# Patient Record
Sex: MALE | Race: BLACK OR AFRICAN AMERICAN | Employment: OTHER | URBAN - METROPOLITAN AREA
[De-identification: names, ages, dates, MRNs, and addresses within clinical notes are randomized per-mention and may not be internally consistent; named-entity substitution may affect disease eponyms.]

---

## 2024-05-13 ENCOUNTER — LAB ENCOUNTER (OUTPATIENT)
Dept: LAB | Facility: REFERENCE LAB | Age: 59
End: 2024-05-13
Attending: FAMILY MEDICINE

## 2024-05-13 ENCOUNTER — TELEPHONE (OUTPATIENT)
Facility: CLINIC | Age: 59
End: 2024-05-13

## 2024-05-13 ENCOUNTER — OFFICE VISIT (OUTPATIENT)
Facility: CLINIC | Age: 59
End: 2024-05-13
Payer: COMMERCIAL

## 2024-05-13 VITALS
BODY MASS INDEX: 28.24 KG/M2 | OXYGEN SATURATION: 97 % | WEIGHT: 208.5 LBS | SYSTOLIC BLOOD PRESSURE: 126 MMHG | HEIGHT: 72 IN | HEART RATE: 104 BPM | DIASTOLIC BLOOD PRESSURE: 70 MMHG

## 2024-05-13 DIAGNOSIS — I73.9 PAD (PERIPHERAL ARTERY DISEASE) (HCC): ICD-10-CM

## 2024-05-13 DIAGNOSIS — E11.65 TYPE 2 DIABETES MELLITUS WITH HYPERGLYCEMIA, WITHOUT LONG-TERM CURRENT USE OF INSULIN (HCC): ICD-10-CM

## 2024-05-13 DIAGNOSIS — I15.2 HYPERTENSION ASSOCIATED WITH TYPE 2 DIABETES MELLITUS (HCC): ICD-10-CM

## 2024-05-13 DIAGNOSIS — L97.525 DIABETIC ULCER OF LEFT FOOT ASSOCIATED WITH TYPE 2 DIABETES MELLITUS, WITH MUSCLE INVOLVEMENT WITHOUT EVIDENCE OF NECROSIS, UNSPECIFIED PART OF FOOT (HCC): ICD-10-CM

## 2024-05-13 DIAGNOSIS — Z00.01 ENCOUNTER FOR WELL ADULT EXAM WITH ABNORMAL FINDINGS: Primary | ICD-10-CM

## 2024-05-13 DIAGNOSIS — Z89.421 HX OF AMPUTATION OF LESSER TOE, RIGHT (HCC): ICD-10-CM

## 2024-05-13 DIAGNOSIS — Z00.01 ENCOUNTER FOR WELL ADULT EXAM WITH ABNORMAL FINDINGS: ICD-10-CM

## 2024-05-13 DIAGNOSIS — Z89.422 HX OF AMPUTATION OF LESSER TOE, LEFT (HCC): ICD-10-CM

## 2024-05-13 DIAGNOSIS — Z72.0 TOBACCO USER: ICD-10-CM

## 2024-05-13 DIAGNOSIS — E11.59 HYPERTENSION ASSOCIATED WITH TYPE 2 DIABETES MELLITUS (HCC): ICD-10-CM

## 2024-05-13 DIAGNOSIS — E11.621 DIABETIC ULCER OF LEFT FOOT ASSOCIATED WITH TYPE 2 DIABETES MELLITUS, WITH MUSCLE INVOLVEMENT WITHOUT EVIDENCE OF NECROSIS, UNSPECIFIED PART OF FOOT (HCC): ICD-10-CM

## 2024-05-13 DIAGNOSIS — E11.42 DIABETIC POLYNEUROPATHY ASSOCIATED WITH TYPE 2 DIABETES MELLITUS (HCC): ICD-10-CM

## 2024-05-13 DIAGNOSIS — E78.5 DYSLIPIDEMIA: ICD-10-CM

## 2024-05-13 PROBLEM — E11.40 DIABETIC NEUROPATHY (HCC): Status: ACTIVE | Noted: 2024-01-25

## 2024-05-13 LAB
ALBUMIN SERPL-MCNC: 4 G/DL (ref 3.2–4.8)
ALBUMIN/GLOB SERPL: 1.1 {RATIO} (ref 1–2)
ALP LIVER SERPL-CCNC: 123 U/L
ALT SERPL-CCNC: <7 U/L
ANION GAP SERPL CALC-SCNC: 7 MMOL/L (ref 0–18)
AST SERPL-CCNC: 11 U/L (ref ?–34)
BASOPHILS # BLD AUTO: 0.1 X10(3) UL (ref 0–0.2)
BASOPHILS NFR BLD AUTO: 1 %
BILIRUB SERPL-MCNC: 0.2 MG/DL (ref 0.3–1.2)
BUN BLD-MCNC: 20 MG/DL (ref 9–23)
BUN/CREAT SERPL: 13.3 (ref 10–20)
CALCIUM BLD-MCNC: 9.2 MG/DL (ref 8.7–10.4)
CHLORIDE SERPL-SCNC: 103 MMOL/L (ref 98–112)
CHOLEST SERPL-MCNC: 276 MG/DL (ref ?–200)
CO2 SERPL-SCNC: 25 MMOL/L (ref 21–32)
COMPLEXED PSA SERPL-MCNC: 1.15 NG/ML (ref ?–4)
CREAT BLD-MCNC: 1.5 MG/DL
DEPRECATED RDW RBC AUTO: 38.5 FL (ref 35.1–46.3)
EGFRCR SERPLBLD CKD-EPI 2021: 53 ML/MIN/1.73M2 (ref 60–?)
EOSINOPHIL # BLD AUTO: 0.3 X10(3) UL (ref 0–0.7)
EOSINOPHIL NFR BLD AUTO: 2.9 %
ERYTHROCYTE [DISTWIDTH] IN BLOOD BY AUTOMATED COUNT: 12.9 % (ref 11–15)
FASTING PATIENT LIPID ANSWER: YES
FASTING STATUS PATIENT QL REPORTED: YES
GLOBULIN PLAS-MCNC: 3.8 G/DL (ref 2–3.5)
GLUCOSE BLD-MCNC: 420 MG/DL (ref 70–99)
HCT VFR BLD AUTO: 39.7 %
HDLC SERPL-MCNC: 34 MG/DL (ref 40–59)
HGB BLD-MCNC: 13.4 G/DL
IMM GRANULOCYTES # BLD AUTO: 0.13 X10(3) UL (ref 0–1)
IMM GRANULOCYTES NFR BLD: 1.2 %
LDLC SERPL CALC-MCNC: 189 MG/DL (ref ?–100)
LYMPHOCYTES # BLD AUTO: 3.78 X10(3) UL (ref 1–4)
LYMPHOCYTES NFR BLD AUTO: 36.2 %
MCH RBC QN AUTO: 27.7 PG (ref 26–34)
MCHC RBC AUTO-ENTMCNC: 33.8 G/DL (ref 31–37)
MCV RBC AUTO: 82 FL
MONOCYTES # BLD AUTO: 0.71 X10(3) UL (ref 0.1–1)
MONOCYTES NFR BLD AUTO: 6.8 %
NEUTROPHILS # BLD AUTO: 5.42 X10 (3) UL (ref 1.5–7.7)
NEUTROPHILS # BLD AUTO: 5.42 X10(3) UL (ref 1.5–7.7)
NEUTROPHILS NFR BLD AUTO: 51.9 %
NONHDLC SERPL-MCNC: 242 MG/DL (ref ?–130)
OSMOLALITY SERPL CALC.SUM OF ELEC: 300 MOSM/KG (ref 275–295)
PLATELET # BLD AUTO: 323 10(3)UL (ref 150–450)
POTASSIUM SERPL-SCNC: 5.1 MMOL/L (ref 3.5–5.1)
PROT SERPL-MCNC: 7.8 G/DL (ref 5.7–8.2)
RBC # BLD AUTO: 4.84 X10(6)UL
SODIUM SERPL-SCNC: 135 MMOL/L (ref 136–145)
TRIGL SERPL-MCNC: 272 MG/DL (ref 30–149)
TSI SER-ACNC: 1.8 MIU/ML (ref 0.55–4.78)
VLDLC SERPL CALC-MCNC: 58 MG/DL (ref 0–30)
WBC # BLD AUTO: 10.4 X10(3) UL (ref 4–11)

## 2024-05-13 PROCEDURE — 84443 ASSAY THYROID STIM HORMONE: CPT

## 2024-05-13 PROCEDURE — 83036 HEMOGLOBIN GLYCOSYLATED A1C: CPT

## 2024-05-13 PROCEDURE — 85025 COMPLETE CBC W/AUTO DIFF WBC: CPT

## 2024-05-13 PROCEDURE — 80061 LIPID PANEL: CPT

## 2024-05-13 PROCEDURE — 36415 COLL VENOUS BLD VENIPUNCTURE: CPT

## 2024-05-13 PROCEDURE — 80053 COMPREHEN METABOLIC PANEL: CPT

## 2024-05-13 RX ORDER — GABAPENTIN 300 MG/1
300 CAPSULE ORAL 3 TIMES DAILY
COMMUNITY

## 2024-05-13 RX ORDER — NICOTINE 21 MG/24HR
1 PATCH, TRANSDERMAL 24 HOURS TRANSDERMAL EVERY 24 HOURS
Qty: 28 EACH | Refills: 3 | Status: SHIPPED | OUTPATIENT
Start: 2024-05-13

## 2024-05-13 RX ORDER — SIMVASTATIN 20 MG
20 TABLET ORAL DAILY
Qty: 90 TABLET | Refills: 3 | Status: SHIPPED | OUTPATIENT
Start: 2024-05-13

## 2024-05-13 RX ORDER — HYDROCODONE BITARTRATE AND ACETAMINOPHEN 5; 325 MG/1; MG/1
1 TABLET ORAL EVERY 12 HOURS PRN
COMMUNITY
Start: 2023-12-09

## 2024-05-13 RX ORDER — GLIPIZIDE 10 MG/1
10 TABLET ORAL
COMMUNITY

## 2024-05-13 RX ORDER — SIMVASTATIN 20 MG
20 TABLET ORAL DAILY
COMMUNITY
Start: 2024-01-02 | End: 2024-05-13

## 2024-05-13 RX ORDER — CLOPIDOGREL BISULFATE 75 MG/1
75 TABLET ORAL NIGHTLY
COMMUNITY

## 2024-05-13 RX ORDER — ASPIRIN 81 MG/1
81 TABLET ORAL DAILY
COMMUNITY
Start: 2021-12-07

## 2024-05-13 NOTE — PROGRESS NOTES
Subjective:   Yo Villagomez is a 59 year old male who presents for Establish Care (Patient comes to the office to establish care. ) and Physical (Patient is requesting annual physical exam. Patient wants a prescription for a cane. )     Hx of amputations and left foot wounds with PAD  Does have diabetes. Has had all toes amputated on left foot and 5th digit amputated on right foot. Was getting chronic pain medication from podiatrist in the past. Was being seen at [facility] but they are no longer in network due to new insurance. Is wondering if can get cane covered by insurance for assistance with ambulation. Also has history of nonhealing foot wounds on left foot. Was being seen by wound care in the past.      DM  Currently on glipizide and metformin. Does not have glucometer. Had eye regular eye exam in march 2024. No dilated exam. No issues with floaters. Has never been diagnosed with any eye problems.    Smoker  Smokes half a pack a day. Used to smoke a pack a day. Has cut self down. Has never used anything like patches or gum. Has never been able to quit on own. Has been smoking for about 15 years.     HTN  Controlled on diet alone    Health screenings  Colon cancer screening did cologuard in 2022    History/Other:    Chief Complaint Reviewed and Verified  Nursing Notes Reviewed and   Verified  Tobacco Reviewed  Allergies Reviewed  Medications Reviewed    Problem List Reviewed  Medical History Reviewed  Surgical History   Reviewed  Family History Reviewed  Social History Reviewed         Tobacco:  Social History     Tobacco Use   Smoking Status Every Day    Current packs/day: 0.50    Average packs/day: 0.5 packs/day for 15.4 years (7.7 ttl pk-yrs)    Types: Cigarettes    Start date: 2009   Smokeless Tobacco Never     E-Cigarettes/Vaping       Questions Responses    E-Cigarette Use Never User           Tobacco cessation counseling for <3 minutes.      Current Outpatient Medications   Medication Sig Dispense  Refill    Continuous Glucose Sensor (FREESTYLE BRIDGER 2 SENSOR) Does not apply Misc Inject 1 Device into the skin every 14 (fourteen) days.      clopidogrel 75 MG Oral Tab Take 1 tablet (75 mg total) by mouth nightly.      aspirin 81 MG Oral Tab EC Take 1 tablet (81 mg total) by mouth daily.      gabapentin 300 MG Oral Cap Take 1 capsule (300 mg total) by mouth 3 (three) times daily.      glipiZIDE 10 MG Oral Tab Take 1 tablet (10 mg total) by mouth 2 (two) times daily before meals.      HYDROcodone-acetaminophen 5-325 MG Oral Tab Take 1 tablet by mouth every 12 (twelve) hours as needed for Pain.      metFORMIN HCl 1000 MG Oral Tab Take 1 tablet (1,000 mg total) by mouth 2 (two) times daily.      simvastatin 20 MG Oral Tab Take 1 tablet (20 mg total) by mouth daily. 90 tablet 3    Misc. Devices (CANE) Does not apply Misc To use daily for ambulation Dx code Z89.422 1 each 0    nicotine 14 MG/24HR Transdermal Patch 24 Hr Place 1 patch onto the skin daily. 28 each 3         Review of Systems:  Review of Systems   Constitutional: Negative.    HENT: Negative.     Eyes: Negative.    Respiratory: Negative.     Cardiovascular: Negative.    Gastrointestinal: Negative.    Genitourinary: Negative.    Musculoskeletal: Negative.    Skin:  Positive for wound (2 seperate left plantar wounds at forefoot. No purulent drainage at either wound.).   Neurological: Negative.    Psychiatric/Behavioral: Negative.         Objective:   /70 (BP Location: Left arm, Patient Position: Sitting)   Pulse 104   Ht 6' (1.829 m)   Wt 208 lb 8 oz (94.6 kg)   SpO2 97%   BMI 28.28 kg/m²  Estimated body mass index is 28.28 kg/m² as calculated from the following:    Height as of this encounter: 6' (1.829 m).    Weight as of this encounter: 208 lb 8 oz (94.6 kg).  Physical Exam  Vitals and nursing note reviewed.   Constitutional:       General: He is not in acute distress.     Appearance: Normal appearance. He is not ill-appearing.   HENT:       Head: Normocephalic and atraumatic.      Right Ear: Tympanic membrane normal. There is no impacted cerumen.      Left Ear: Tympanic membrane normal. There is no impacted cerumen.      Mouth/Throat:      Mouth: Mucous membranes are moist.      Pharynx: Oropharynx is clear. No oropharyngeal exudate or posterior oropharyngeal erythema.   Eyes:      General:         Right eye: No discharge.         Left eye: No discharge.      Extraocular Movements: Extraocular movements intact.      Pupils: Pupils are equal, round, and reactive to light.   Cardiovascular:      Rate and Rhythm: Normal rate and regular rhythm.      Heart sounds: Normal heart sounds. No murmur heard.     No friction rub. No gallop.   Pulmonary:      Effort: Pulmonary effort is normal.      Breath sounds: Normal breath sounds. No wheezing, rhonchi or rales.   Abdominal:      General: Abdomen is flat. Bowel sounds are normal. There is no distension.      Palpations: Abdomen is soft. There is no mass.      Tenderness: There is no abdominal tenderness. There is no guarding or rebound.   Musculoskeletal:         General: Normal range of motion.      Cervical back: Normal range of motion.      Right lower leg: No edema.      Left lower leg: No edema.   Skin:     General: Skin is warm and dry.      Comments: Right foot  Noted lichenification to lateral edge of right foot. Wound to plantar surface at lateral edge of right foot just inferior to amputated 5th digit covered by lichenified skin unable to appreciate stage    Left foot  Two wounds at forefoot. No purulent drainage more midline wound appears to be about stage 4. Not roni to stage lateral forefoot left foot wound   Neurological:      General: No focal deficit present.      Mental Status: He is alert. Mental status is at baseline.   Psychiatric:         Mood and Affect: Mood normal.         Behavior: Behavior normal.       Bilateral barefoot skin diabetic exam is abnormal with diabetic  monofilament/sensation testing abnormal, pulsation pedal pulse exam abnormal, skin breakdown (Ulcer), amputation and/or ulceration, and dystrophic nails and/or dry skin       Assessment & Plan:   1. Encounter for well adult exam with abnormal findings (Primary)  -     CBC With Differential With Platelet; Future; Expected date: 05/13/2024  -     Lipid Panel; Future; Expected date: 05/13/2024  -     Comp Metabolic Panel (14); Future; Expected date: 05/13/2024  -     Hemoglobin A1C; Future; Expected date: 05/13/2024  -     TSH W Reflex To Free T4; Future; Expected date: 05/13/2024  -     PSA Total, Screen; Future; Expected date: 05/13/2024    -ordered annual labs    2. Type 2 diabetes mellitus with hyperglycemia, without long-term current use of insulin ([facility])  -     Hemoglobin A1C; Future; Expected date: 05/13/2024  -     DME No Charge Printable  -     Diabetic Retinopathy Exam; Future; Expected date: 05/13/2024  -     PCV20 (Prevnar 20)    -previous A1c not well controlled. Ordered updated A1c to assess current status. Pending A1c results will adjust diabetes regimen.   -patient denies any eye issues. Placed referral for retinal camera    3. Diabetic polyneuropathy associated with type 2 diabetes mellitus ([facility])  On gabapentin. Well controlled. No need for refills at this time    4. PAD (peripheral artery disease) ([facility])  -given other other issues addressed was not able to fully address. At follow up will ensure has follow up with vascular surgery. No need for refills of aspirin or clopidogrel at this time    5. Dyslipidemia  -     Simvastatin; Take 1 tablet (20 mg total) by mouth daily.  Dispense: 90 tablet; Refill: 3    -patient requests refills    6. Hypertension associated with type 2 diabetes mellitus ([facility])  -well controlled. Diet controlled    7. Hx of amputation of lesser toe, left ([facility])  -     Cane; To use daily for ambulation Dx code Z89.422  Dispense: 1 each; Refill: 0  -     Podiatry Referral    -stable.  Placed referral to podiatry and wound care for further management.   -placed order for cane    8. Hx of amputation of lesser toe, right (HCC)  -     Podiatry Referral    -stable. Placed referral to podiatry and wound care for further management.   -placed order for cane    9. Tobacco user  -     PCV20 (Prevnar 20)  -     Nicotine; Place 1 patch onto the skin daily.  Dispense: 28 each; Refill: 3    -vaccine administered by staff  -patient is interested in further assistance in quitting smoking. Open to patches. Sent in    10. Diabetic ulcer of left foot associated with type 2 diabetes mellitus, with muscle involvement without evidence of necrosis, unspecified part of foot (HCC)  -     [facility] WOUND EVAL    -not well controlled/healed. Placed referral to wound care and podiatry. Do not appear acutely infected.   -rewrapped with unna wrapping. Provided supplies to patient    Face to Face Attestation  Yo Villagomez is a 59 year old old male who has type 2 diabetes mellitus with hyperglycemia, without long-term current use of insulin ([facility]), Diabetic polyneuropathy associated with type 2 diabetes mellitus ([facility]), PAD (peripheral artery disease) ([facility]), Hx of amputation of lesser toe, left (HCC), Hx of amputation of lesser toe, right (HCC) and Diabetic ulcer of left foot associated with type 2 diabetes mellitus, with muscle involvement without evidence of necrosis, unspecified part of foot ([facility]) . He  needs DME services for cane. There is a normal inability to leave home and leaving home requires considerable and taxing effort.      Return in about 3 months (around 8/13/2024) for Diabetes.    Alea Al MD, 5/13/2024, 11:02 AM

## 2024-05-14 ENCOUNTER — TELEPHONE (OUTPATIENT)
Facility: CLINIC | Age: 59
End: 2024-05-14

## 2024-05-14 NOTE — TELEPHONE ENCOUNTER
Received a call from patient requesting a referral for a Podiatry that is call [facility].    Patient stated a referral is needed due to his  [employer] insurance .  Please assist with a referral.          [facility]   [address], [place] [address]  Phone : [phone].

## 2024-05-15 LAB — HGBA1C: >15.5 %

## 2024-05-15 NOTE — PROGRESS NOTES
Chief Complaint   Patient presents with    Wound Care     Bilateral feet. Pt has not checked Bs today      HPI:   5/16/24: 59 yr old male here today for chronic wound on left foot. His current medical problems include uncontrolled T2DM (A1c 15.5% on 5/13/24), diabetic neuropathy, tobacco use (1/2 PPD), HLD, PAD. 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 He underwent RLE angiogram with intervention 2/20/23. He reports he had been seeing a podiatrist regularly until his insurance changed this year. He noticed wounds developed on his left foot about 4 months ago. He  has been applying betadine and covering with gauze almost every day. He denies drainage from the right foot and has noticed the skin changes over the past year as well. He has chronic intermittent pain in both feet for which he reports he previously received [place] from his podiatrist. He used to wear a boot to offload wounds, no longer has it. He tries to walk on his heel as much as possible. PCP ordered a cane, but is having a hard time getting from DME company. He lives alone, says he stays with his mother often. He has cut back on tobacco use from 1 PPD to 1/2 PPD. He denies recent alcohol use and denies recent illicit substance use.     Lab Results   Component Value Date    BUN 20 05/13/2024    CREATSERUM 1.50 (H) 05/13/2024    ALB 4.0 05/13/2024    TP 7.8 05/13/2024    A1C  05/13/2024      Comment:      Hemoglobin A1C to be confirmed at [facility]    A1C >15.5 (H) 05/13/2024       Pertinent Imaging/Results (copy/pasted from Care Everywhere--[facility]):    Garry Fournier MD - 01/23/2023   Formatting of this note might be different from the original.   EXAM:   ABIs COMPLETE     HISTORY:   PAD     COMPARISON:   None     TECHNIQUE:   Segmental arterial pressures and Doppler waveforms at three arterial levels in the leg, and toe plethysmography.     FINDINGS:   RIGHT: LACIE 0.61, toe brachial index 0.23, toe pressure 32  mmHg. Brachial pressure 137 mmHg.     Right leg waveforms as follows:   Common femoral: Triphasic   Superficial femoral:  Triphasic   Popliteal:  Triphasic   Posterior tibial:  Monophasic   Dorsalis pedis:  Monophasic     LEFT: LACIE 0.91, toe brachial index was unable to be performed due to amputation. Brachial pressure 130 mmHg.     Left leg waveforms as follows:   Common femoral: Triphasic   Superficial femoral:  Triphasic   Popliteal:  Triphasic   Posterior tibial:  Triphasic   Dorsalis pedis:  Triphasic     IMPRESSION:   1. On the right, there is there is moderate evidence of arterial insufficiency by waveform and LACIE criteria. There is severe pedal arterial insufficiency by TBI criteria (TBI 0.23) and waveform amplitude, suggestive of poor wound healing potential.     2.  On the left, there is mild arterial insufficiency by waveform and TBI criteria. Unable to perform TBI due to amputation..     IR Arteriogram Lower Extremity Unilateral/Bilateral 2/20/23    Anatomical Region Laterality Modality   Upper Extremities -- X-Ray Angiography   Lower Extremities -- --     Impression  1. Right lower extremity angiogram demonstrates focal moderate popliteal stenosis, moderate stenosis of the proximal to mid peroneal artery and occluded PT occluded reconstituted at the ankle. The AT is occluded with only DP reconstitution.  2. Successful revascularization of the distal popliteal, TPT trunk, Peroneal artery and posterior tibial artery with POBA.  3. Balloon angioplasty of the peroneal artery, tibioperoneal trunk, and posterior tibial artery. Proximal aspects of each tibial vessel were scoring balloon angioplasty.  4. 4mm [place] DCB treatment of the distal pop extending to the proximal PT.    PLAN:  1. Bedrest 3 hours. Groin checks and pulse checks as ordered.  2. Okay to restart ASA and Plavix tonight.  3. Follow-up with Dr. Mueller in [facility] in 4 weeks with LACIE/ toe pressures.      Current  Outpatient Medications:     sodium hypochlorite 0.125 % External Solution, Moisten gauze with solution. Lightly fluff into wound beds. Cover with dry gauze. Secure with gauze roll and tape. Change twice daily., Disp: 237 mL, Rfl: 1    Continuous Glucose Sensor (FREESTYLE BRIDGER 2 SENSOR) Does not apply Misc, Inject 1 Device into the skin every 14 (fourteen) days., Disp: , Rfl:     clopidogrel 75 MG Oral Tab, Take 1 tablet (75 mg total) by mouth nightly., Disp: , Rfl:     aspirin 81 MG Oral Tab EC, Take 1 tablet (81 mg total) by mouth daily., Disp: , Rfl:     gabapentin 300 MG Oral Cap, Take 1 capsule (300 mg total) by mouth 3 (three) times daily., Disp: , Rfl:     glipiZIDE 10 MG Oral Tab, Take 1 tablet (10 mg total) by mouth 2 (two) times daily before meals., Disp: , Rfl:     HYDROcodone-acetaminophen 5-325 MG Oral Tab, Take 1 tablet by mouth every 12 (twelve) hours as needed for Pain., Disp: , Rfl:     metFORMIN HCl 1000 MG Oral Tab, Take 1 tablet (1,000 mg total) by mouth 2 (two) times daily., Disp: , Rfl:     simvastatin 20 MG Oral Tab, Take 1 tablet (20 mg total) by mouth daily., Disp: 90 tablet, Rfl: 3    Misc. Devices (CANE) Does not apply Misc, To use daily for ambulation Dx code Z89.422, Disp: 1 each, Rfl: 0    nicotine 14 MG/24HR Transdermal Patch 24 Hr, Place 1 patch onto the skin daily., Disp: 28 each, Rfl: 3    Allergies   Allergen Reactions    Ace Inhibitors UNKNOWN      REVIEW OF SYSTEMS:   Review of Systems   Constitutional:  Negative for activity change, chills and fever.   Respiratory:  Negative for shortness of breath.    Cardiovascular:  Negative for chest pain and leg swelling.   Gastrointestinal:  Negative for abdominal pain, diarrhea and vomiting.   Musculoskeletal:  Positive for arthralgias and gait problem.   Skin:  Positive for wound. Negative for rash.   Neurological:  Negative for dizziness, numbness and headaches.      PHYSICAL EXAM:   /79   Pulse 101   Temp 97.5 °F (36.4 °C)    Resp 18   Ht 72\"   Wt 208 lb   SpO2 99%   BMI 28.21 kg/m²    Estimated body mass index is 28.21 kg/m² as calculated from the following:    Height as of this encounter: 72\".    Weight as of this encounter: 208 lb.   Vital signs reviewed.Appears stated age, well groomed.    Physical Exam  Constitutional:       General: He is not in acute distress.     Appearance: Normal appearance. He is obese. He is not ill-appearing.   HENT:      Head: Normocephalic and atraumatic.   Cardiovascular:      Pulses:           Dorsalis pedis pulses are 2+ on the right side and 2+ on the left side.        Posterior tibial pulses are 2+ on the right side and 2+ on the left side.   Pulmonary:      Effort: Pulmonary effort is normal.   Musculoskeletal:      Right foot: Deformity present.      Left foot: Deformity present.      Right Lower Extremity: Right leg is amputated below ankle. (Right 5th toe)     Left Lower Extremity: Left leg is amputated below ankle. (TMA)  Feet:      Right foot:      Skin integrity: Skin breakdown (Dry gangrene of right 4th & 5th toes), callus, dry skin and fissure present. No erythema or warmth.      Toenail Condition: Right toenails are abnormally thick.      Left foot:      Skin integrity: Ulcer, callus and dry skin present. No erythema or warmth.      Toenail Condition: Left toenails are abnormally thick.      Comments: B/l PT and DP with biphasic wave sounds using handheld doppler. Hair present on toes.    Skin:     General: Skin is warm and dry.      Capillary Refill: Capillary refill takes 2 to 3 seconds.      Findings: Wound (left foot) present.   Neurological:      General: No focal deficit present.      Mental Status: He is alert and oriented to person, place, and time.   Psychiatric:         Mood and Affect: Mood normal.         Behavior: Behavior normal.        Wound 05/16/24 1 Foot Right;Lateral (Active)   Date First Assessed/Time First Assessed: 05/16/24 7101    Wound Number (Wound Clinic Only):  1  Primary Wound Type: Amputation  Location: Foot  Wound Location Orientation: Right;Lateral      Assessments 5/16/2024  2:17 PM   Wound Image      Site Assessment Dry;Black   Closure Approximated   Drainage Amount None   Treatments Cleansed;Saline   Dressing Open to air       No associated orders.       Wound 05/16/24 2 Foot Left;Plantar (Active)   Date First Assessed/Time First Assessed: 05/16/24 1408    Wound Number (Wound Clinic Only): 2  Primary Wound Type: Amputation  Location: Foot  Wound Location Orientation: Left;Plantar      Assessments 5/16/2024  2:17 PM   Wound Image       Site Assessment Fragile;Moist;Red;Yellow;Brown   Closure Not approximated   Drainage Amount Small   Drainage Description Serosanguineous   Treatments Cleansed;Saline   Dressing 4x4s;Gauze;Kerlix roll;Tape   Dressing Changed New   Dressing Status Clean;Dry;Intact   Wound Length (cm) 2.2 cm   Wound Width (cm) 2.5 cm   Wound Surface Area (cm^2) 5.5 cm^2   Wound Depth (cm) 0.8 cm   Wound Volume (cm^3) 4.4 cm^3   Margins Undefined edges   Marcy-wound Assessment Dry;Black;Callous   Wound Granulation Tissue Red   Wound Bed Granulation (%) 80 %   Wound Bed Epithelium (%) 0 %   Wound Bed Slough (%) 20 %   Wound Bed Eschar (%) 0 %   Wound Bed Fibrin (%) 0 %   State of Healing Early/partial granulation   Wound Odor Strong   Undermining? Yes   Number of Undermines 1   Undermine 1 Start Position 2   Undermine 1 End Position 3   Nunn Scale Grade 2       Active Orders   Date Order Priority Status Authorizing Provider   05/18/24 2567 OP Wound Dressing Routine Active Maite Goddard APRN     - Cleansing:    Cleanse with normal saline or wound cleanser     - Dressing:    Dry gauze     - Dressing:    Kerlix     - Additional Wound Dressing Information:    Dakin's solution 0.125% solution moist to dry dressing     - Frequency:    Change dressing daily and PRN       Wound 05/16/24 3 Foot Left;Lateral (Active)   Date First Assessed: 05/16/24    Wound  Number (Wound Clinic Only): 3  Primary Wound Type: Diabetic Ulcer  Location: Foot  Wound Location Orientation: Left;Lateral      Assessments 5/16/2024  2:17 PM   Wound Image     Site Assessment Dry;Red;Yellow;Brown   Closure Not approximated   Drainage Amount Scant   Drainage Description Serous;Yellow   Treatments Cleansed;Saline   Dressing 4x4s;Gauze;Kerlix roll;Tape   Dressing Changed New   Dressing Status Clean;Dry;Intact   Wound Length (cm) 0.9 cm   Wound Width (cm) 1 cm   Wound Surface Area (cm^2) 0.9 cm^2   Wound Depth (cm) 0.5 cm   Wound Volume (cm^3) 0.45 cm^3   Margins Not attached   Marcy-wound Assessment Dry;Red   Wound Granulation Tissue Red   Wound Bed Granulation (%) 70 %   Wound Bed Epithelium (%) 0 %   Wound Bed Slough (%) 30 %   Wound Bed Eschar (%) 0 %   Wound Bed Fibrin (%) 0 %   State of Healing Non-healing   Wound Odor Strong   Tunneling? No   Undermining? Yes   Number of Undermines 1   Undermine 1 Start Position 3   Undermine 1 End Position 6       Active Orders   Date Order Priority Status Authorizing Provider   05/18/24 2213 OP Wound Dressing Routine Active Maite Goddard APRN     - Cleansing:    Cleanse with normal saline or wound cleanser     - Dressing:    Dry gauze     - Dressing:    Kerlix     - Additional Wound Dressing Information:    Dakin's solution 0.125% solution moist to dry dressing     - Frequency:    Change dressing daily and PRN        ASSESSMENT AND PLAN:      1. Diabetic ulcer of left midfoot associated with type 2 diabetes mellitus, with fat layer exposed (HCC)  - XR FOOT, COMPLETE (MIN 3 VIEWS), LEFT (CPT=73630); Future  - sodium hypochlorite 0.125 % External Solution; Moisten gauze with solution. Lightly fluff into wound beds. Cover with dry gauze. Secure with gauze roll and tape. Change twice daily.  Dispense: 237 mL; Refill: 1  - OP Wound Dressing; Standing    2. Dry gangrene (HCC)    3. Chronic foot pain, right  - XR FOOT, COMPLETE (MIN 3 VIEWS), RIGHT (CPT=73630);  Future  - OP Wound Dressing; Standing    4. Uncontrolled type 2 diabetes mellitus with hyperglycemia, without long-term current use of insulin (HCC)    5. Tobacco dependence with current use    6. PAD (peripheral artery disease) ([facility])    Here today for evaluation of DFUs on the lateral and plantar aspect of his left foot. He is s/p L TMA 11/28/21. Pt reports wounds have been present for about 4 months. The right foot was evaluated as well, dry gangrene of the right 4th and 5th toes, no open wound.   The wound beds of he are granular with slough, periwound with thick callus and dry skin. Wound bed was mechanically debrided with dry gauze and periwound callus trimmed with scissors. Wounds were soaked with Dakin's 0.125% for 10 minutes. Recommended he do a wet to moist dressing using Dakin's 0.125% with gauze twice daily.   Xrays were ordered of both feet to evaluate for underlying OM. Pt has a referral to podiatry. Recommended he see a vascular surgeon as well, encouraged him to schedule appointments. Will f/u on status at visit next week.   Discussed the process of wound healing, creating/maintaining a moist, clean environment for wound healing  Discussed hindering factors for wound healing that include uncontrolled T2DM, tobacco use, PAD and peripheral neuropathy.  Discussed signs and symptoms of infection, encouraged patient to assess skin daily.  Discussed nutrition for wound healing, encouraged reduced carbohydrate intake, increased protein intake, and healthy diet. Recommended increased vitamin intake (either with foods or vitamin supplements), need vitamin A, Bs, C, D and zinc for wound healing.   Discussed importance of glucose control and quitting smoking  Risks, benefits, and alternatives of current treatment plan discussed in detail.  Questions and concerns addressed. Red flags to RTC or ED reviewed.  Patient agrees to plan.      Return in about 1 week (around 5/23/2024) for APN visit x 30 mins.    I spent  60 minutes with the patient. This time included:  preparing to see the patient (eg, review notes and recent diagnostics), seeing the patient, performing a medically appropriate examination and/or evaluation, counseling and educating the patient, and documenting in the record.    NOTE TO PATIENT: The 21st Century Cures Act makes clinical notes like these available to patients in the interest of transparency. Clinical notes are medical documents used by physicians and care providers to communicate with each other. These documents include medical language and terminology, abbreviations, and treatment information that may sound technical and at times possibly unfamiliar. In addition, at times, the verbiage may appear blunt or direct. These documents are one tool providers use to communicate relevant information and clinical opinions of the care providers in a way that allows common understanding of the clinical context.

## 2024-05-16 ENCOUNTER — HOSPITAL ENCOUNTER (OUTPATIENT)
Dept: GENERAL RADIOLOGY | Facility: HOSPITAL | Age: 59
Discharge: HOME | End: 2024-05-16
Attending: NURSE PRACTITIONER
Payer: MEDICARE

## 2024-05-16 ENCOUNTER — OFFICE VISIT (OUTPATIENT)
Dept: WOUND CARE | Facility: HOSPITAL | Age: 59
End: 2024-05-16
Attending: NURSE PRACTITIONER
Payer: MEDICARE

## 2024-05-16 VITALS
WEIGHT: 208 LBS | DIASTOLIC BLOOD PRESSURE: 79 MMHG | OXYGEN SATURATION: 99 % | TEMPERATURE: 98 F | RESPIRATION RATE: 18 BRPM | HEIGHT: 72 IN | BODY MASS INDEX: 28.17 KG/M2 | HEART RATE: 101 BPM | SYSTOLIC BLOOD PRESSURE: 122 MMHG

## 2024-05-16 DIAGNOSIS — E11.621 DIABETIC ULCER OF LEFT MIDFOOT ASSOCIATED WITH TYPE 2 DIABETES MELLITUS, WITH FAT LAYER EXPOSED (HCC): ICD-10-CM

## 2024-05-16 DIAGNOSIS — E11.65 UNCONTROLLED TYPE 2 DIABETES MELLITUS WITH HYPERGLYCEMIA, WITHOUT LONG-TERM CURRENT USE OF INSULIN (HCC): ICD-10-CM

## 2024-05-16 DIAGNOSIS — F17.200 TOBACCO DEPENDENCE WITH CURRENT USE: ICD-10-CM

## 2024-05-16 DIAGNOSIS — M79.671 CHRONIC FOOT PAIN, RIGHT: ICD-10-CM

## 2024-05-16 DIAGNOSIS — L97.422 DIABETIC ULCER OF LEFT MIDFOOT ASSOCIATED WITH TYPE 2 DIABETES MELLITUS, WITH FAT LAYER EXPOSED (HCC): ICD-10-CM

## 2024-05-16 DIAGNOSIS — I73.9 PAD (PERIPHERAL ARTERY DISEASE) (HCC): ICD-10-CM

## 2024-05-16 DIAGNOSIS — G89.29 CHRONIC FOOT PAIN, RIGHT: ICD-10-CM

## 2024-05-16 DIAGNOSIS — I96 DRY GANGRENE (HCC): ICD-10-CM

## 2024-05-16 DIAGNOSIS — E11.621 DIABETIC ULCER OF LEFT MIDFOOT ASSOCIATED WITH TYPE 2 DIABETES MELLITUS, WITH FAT LAYER EXPOSED (HCC): Primary | ICD-10-CM

## 2024-05-16 DIAGNOSIS — L97.422 DIABETIC ULCER OF LEFT MIDFOOT ASSOCIATED WITH TYPE 2 DIABETES MELLITUS, WITH FAT LAYER EXPOSED (HCC): Primary | ICD-10-CM

## 2024-05-16 PROCEDURE — 73630 X-RAY EXAM OF FOOT: CPT | Performed by: NURSE PRACTITIONER

## 2024-05-16 PROCEDURE — 99215 OFFICE O/P EST HI 40 MIN: CPT | Performed by: NURSE PRACTITIONER

## 2024-05-16 RX ORDER — SODIUM HYPOCHLORITE 1.25 MG/ML
SOLUTION TOPICAL
Qty: 237 ML | Refills: 1 | Status: SHIPPED | OUTPATIENT
Start: 2024-05-16

## 2024-05-16 NOTE — TELEPHONE ENCOUNTER
Waiting for patient to respond,, he did see [employer] message sent.  Last read by Yo Villagomez at  6:07 PM on 5/14/2024.

## 2024-05-23 ENCOUNTER — OFFICE VISIT (OUTPATIENT)
Dept: WOUND CARE | Facility: HOSPITAL | Age: 59
End: 2024-05-23
Attending: STUDENT IN AN ORGANIZED HEALTH CARE EDUCATION/TRAINING PROGRAM
Payer: MEDICARE

## 2024-05-23 VITALS
DIASTOLIC BLOOD PRESSURE: 43 MMHG | HEART RATE: 102 BPM | RESPIRATION RATE: 18 BRPM | SYSTOLIC BLOOD PRESSURE: 122 MMHG | TEMPERATURE: 97 F | OXYGEN SATURATION: 97 %

## 2024-05-23 DIAGNOSIS — M79.671 CHRONIC FOOT PAIN, RIGHT: ICD-10-CM

## 2024-05-23 DIAGNOSIS — L97.422 DIABETIC ULCER OF LEFT MIDFOOT ASSOCIATED WITH TYPE 2 DIABETES MELLITUS, WITH FAT LAYER EXPOSED (HCC): Primary | ICD-10-CM

## 2024-05-23 DIAGNOSIS — I73.9 PAD (PERIPHERAL ARTERY DISEASE) (HCC): ICD-10-CM

## 2024-05-23 DIAGNOSIS — E11.621 DIABETIC ULCER OF LEFT MIDFOOT ASSOCIATED WITH TYPE 2 DIABETES MELLITUS, WITH FAT LAYER EXPOSED (HCC): Primary | ICD-10-CM

## 2024-05-23 DIAGNOSIS — I96 DRY GANGRENE (HCC): ICD-10-CM

## 2024-05-23 DIAGNOSIS — G89.29 CHRONIC FOOT PAIN, RIGHT: ICD-10-CM

## 2024-05-23 DIAGNOSIS — E11.65 UNCONTROLLED TYPE 2 DIABETES MELLITUS WITH HYPERGLYCEMIA, WITHOUT LONG-TERM CURRENT USE OF INSULIN (HCC): ICD-10-CM

## 2024-05-23 DIAGNOSIS — F17.200 TOBACCO DEPENDENCE WITH CURRENT USE: ICD-10-CM

## 2024-05-23 PROCEDURE — 99213 OFFICE O/P EST LOW 20 MIN: CPT | Performed by: NURSE PRACTITIONER

## 2024-05-23 NOTE — PROGRESS NOTES
Chief Complaint   Patient presents with    Wound Recheck     Bilateral feet     HPI:   5/23/24: F/u visit for left foot wounds. Didn't  Dakin's solution after last visit and hasn't received gauze and tape from [employer], therefore, says he has continued to apply betadine and gauze to wounds. Denies change in health since last visit, no new concerns.    5/16/24: 59 yr old male here today for chronic wound on left foot. His current medical problems include uncontrolled T2DM (A1c 15.5% on 5/13/24), diabetic neuropathy, tobacco use (1/2 PPD), HLD, PAD. 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 He underwent RLE angiogram with intervention 2/20/23. He reports he had been seeing a podiatrist regularly until his insurance changed this year. He noticed wounds developed on his left foot about 4 months ago. He  has been applying betadine and covering with gauze almost every day. He denies drainage from the right foot and has noticed the skin changes over the past year as well. He has chronic intermittent pain in both feet for which he reports he previously received [place] from his podiatrist. He used to wear a boot to offload wounds, no longer has it. He tries to walk on his heel as much as possible. PCP ordered a cane, but is having a hard time getting from [employer] company. He lives alone, says he stays with his mother often. He has cut back on tobacco use from 1 PPD to 1/2 PPD. He denies recent alcohol use and denies recent illicit substance use.     Lab Results   Component Value Date    BUN 20 05/13/2024    CREATSERUM 1.50 (H) 05/13/2024    ALB 4.0 05/13/2024    TP 7.8 05/13/2024    A1C  05/13/2024      Comment:      Hemoglobin A1C to be confirmed at [facility]    A1C >15.5 (H) 05/13/2024       Pertinent Imaging/Results (copy/pasted from Care Everywhere--[facility]):    LACIE 1/23/2023  Garry Fournier MD - 01/23/2023   Formatting of this note might be different from the original.   EXAM:    ABIs COMPLETE     HISTORY:   PAD     COMPARISON:   None     TECHNIQUE:   Segmental arterial pressures and Doppler waveforms at three arterial levels in the leg, and toe plethysmography.     FINDINGS:   RIGHT: LACIE 0.61, toe brachial index 0.23, toe pressure 32 mmHg. Brachial pressure 137 mmHg.     Right leg waveforms as follows:   Common femoral: Triphasic   Superficial femoral:  Triphasic   Popliteal:  Triphasic   Posterior tibial:  Monophasic   Dorsalis pedis:  Monophasic     LEFT: LACIE 0.91, toe brachial index was unable to be performed due to amputation. Brachial pressure 130 mmHg.     Left leg waveforms as follows:   Common femoral: Triphasic   Superficial femoral:  Triphasic   Popliteal:  Triphasic   Posterior tibial:  Triphasic   Dorsalis pedis:  Triphasic     IMPRESSION:   1. On the right, there is there is moderate evidence of arterial insufficiency by waveform and LACIE criteria. There is severe pedal arterial insufficiency by TBI criteria (TBI 0.23) and waveform amplitude, suggestive of poor wound healing potential.     2.  On the left, there is mild arterial insufficiency by waveform and TBI criteria. Unable to perform TBI due to amputation..     IR Arteriogram Lower Extremity Unilateral/Bilateral 2/20/23  Impression:  1. Right lower extremity angiogram demonstrates focal moderate popliteal stenosis, moderate stenosis of the proximal to mid peroneal artery and occluded PT occluded reconstituted at the ankle. The AT is occluded with only DP reconstitution.  2. Successful revascularization of the distal popliteal, TPT trunk, Peroneal artery and posterior tibial artery with POBA.  3. Balloon angioplasty of the peroneal artery, tibioperoneal trunk, and posterior tibial artery. Proximal aspects of each tibial vessel were scoring balloon angioplasty.  4. 4mm Oakley DCB treatment of the distal pop extending to the proximal PT.      Current Outpatient Medications:     sodium hypochlorite 0.125 % External Solution,  Moisten gauze with solution. Lightly fluff into wound beds. Cover with dry gauze. Secure with gauze roll and tape. Change twice daily., Disp: 237 mL, Rfl: 1    Continuous Glucose Sensor (FREESTYLE BRIDGER 2 SENSOR) Does not apply Misc, Inject 1 Device into the skin every 14 (fourteen) days., Disp: , Rfl:     clopidogrel 75 MG Oral Tab, Take 1 tablet (75 mg total) by mouth nightly., Disp: , Rfl:     aspirin 81 MG Oral Tab EC, Take 1 tablet (81 mg total) by mouth daily., Disp: , Rfl:     gabapentin 300 MG Oral Cap, Take 1 capsule (300 mg total) by mouth 3 (three) times daily., Disp: , Rfl:     glipiZIDE 10 MG Oral Tab, Take 1 tablet (10 mg total) by mouth 2 (two) times daily before meals., Disp: , Rfl:     HYDROcodone-acetaminophen 5-325 MG Oral Tab, Take 1 tablet by mouth every 12 (twelve) hours as needed for Pain., Disp: , Rfl:     metFORMIN HCl 1000 MG Oral Tab, Take 1 tablet (1,000 mg total) by mouth 2 (two) times daily., Disp: , Rfl:     simvastatin 20 MG Oral Tab, Take 1 tablet (20 mg total) by mouth daily., Disp: 90 tablet, Rfl: 3    Misc. Devices (CANE) Does not apply Misc, To use daily for ambulation Dx code Z89.422, Disp: 1 each, Rfl: 0    nicotine 14 MG/24HR Transdermal Patch 24 Hr, Place 1 patch onto the skin daily., Disp: 28 each, Rfl: 3    Allergies   Allergen Reactions    Ace Inhibitors UNKNOWN      REVIEW OF SYSTEMS:   Review of Systems   Constitutional:  Negative for activity change, chills and fever.   Respiratory:  Negative for shortness of breath.    Cardiovascular:  Negative for chest pain and leg swelling.   Gastrointestinal:  Negative for abdominal pain, diarrhea and vomiting.   Musculoskeletal:  Positive for arthralgias and gait problem.   Skin:  Positive for wound. Negative for rash.   Neurological:  Negative for dizziness, numbness and headaches.      PHYSICAL EXAM:   /43 (BP Location: Right arm, Patient Position: Sitting, Cuff Size: adult)   Pulse 102   Temp 97.3 °F (36.3 °C) (Oral)    Resp 18   SpO2 97%    Estimated body mass index is 28.21 kg/m² as calculated from the following:    Height as of 5/16/24: 72\".    Weight as of 5/16/24: 208 lb.   Vital signs reviewed.Appears stated age, well groomed.    Physical Exam  Constitutional:       General: He is not in acute distress.     Appearance: Normal appearance. He is obese. He is not ill-appearing.   HENT:      Head: Normocephalic and atraumatic.   Cardiovascular:      Pulses:           Dorsalis pedis pulses are 2+ on the right side and 2+ on the left side.        Posterior tibial pulses are 2+ on the right side and 2+ on the left side.   Pulmonary:      Effort: Pulmonary effort is normal.   Musculoskeletal:      Right foot: Deformity present.      Left foot: Deformity present.      Right Lower Extremity: Right leg is amputated below ankle. (Right 5th toe)     Left Lower Extremity: Left leg is amputated below ankle. (TMA)  Feet:      Right foot:      Skin integrity: Skin breakdown (Dry gangrene of right 4th & 5th toes), callus, dry skin and fissure present. No erythema or warmth.      Toenail Condition: Right toenails are abnormally thick.      Left foot:      Skin integrity: Ulcer, callus and dry skin present. No erythema or warmth.      Toenail Condition: Left toenails are abnormally thick.      Comments: B/l PT and DP with biphasic wave sounds using handheld doppler. Hair present on toes.    Skin:     General: Skin is warm and dry.      Capillary Refill: Capillary refill takes 2 to 3 seconds.      Findings: Wound (left foot) present.   Neurological:      General: No focal deficit present.      Mental Status: He is alert and oriented to person, place, and time.   Psychiatric:         Mood and Affect: Mood normal.         Behavior: Behavior normal.        Wound 05/16/24 2 Foot Left;Plantar (Active)   Date First Assessed/Time First Assessed: 05/16/24 1408    Wound Number (Wound Clinic Only): 2  Primary Wound Type: Amputation  Location: Foot  Wound  Location Orientation: Left;Plantar      Assessments 5/16/2024  2:17 PM 5/23/2024  1:34 PM   Wound Image         Site Assessment Fragile;Moist;Red;Yellow;Brown Fragile;Moist;Red;Yellow;Brown   Closure Not approximated Not approximated   Drainage Amount Small Small   Drainage Description Serosanguineous Serosanguineous   Treatments Cleansed;Saline Cleansed;Saline   Dressing 4x4s;Gauze;Kerlix roll;Tape 4x4s;Gauze;Kerlix roll;Tape   Dressing Changed New Changed   Dressing Status Clean;Dry;Intact Dressing Changed   Wound Length (cm) 2.2 cm 2.2 cm   Wound Width (cm) 2.5 cm 2.5 cm   Wound Surface Area (cm^2) 5.5 cm^2 5.5 cm^2   Wound Depth (cm) 0.8 cm 0.8 cm   Wound Volume (cm^3) 4.4 cm^3 4.4 cm^3   Wound Healing % -- 0   Margins Undefined edges Undefined edges   Marcy-wound Assessment Dry;Black;Callous Dry;Black;Callous   Wound Granulation Tissue Red Red   Wound Bed Granulation (%) 80 % 80 %   Wound Bed Epithelium (%) 0 % 0 %   Wound Bed Slough (%) 20 % 20 %   Wound Bed Eschar (%) 0 % 0 %   Wound Bed Fibrin (%) 0 % 0 %   State of Healing Early/partial granulation Early/partial granulation   Wound Odor Strong Strong   Undermining? Yes Yes   Number of Undermines 1 1   Undermine 1 Start Position 2 2   Undermine 1 End Position 3 3   Nunn Scale Grade 2 Grade 2       Active Orders   Date Order Priority Status Authorizing Provider   05/18/24 [id number] OP Wound Dressing Routine Active Maite Goddard APRN     - Cleansing:    Cleanse with normal saline or wound cleanser     - Dressing:    Dry gauze     - Dressing:    Kerlix     - Additional Wound Dressing Information:    Dakin's solution 0.125% solution moist to dry dressing     - Frequency:    Change dressing daily and PRN       Wound 05/16/24 3 Foot Left;Lateral (Active)   Date First Assessed: 05/16/24    Wound Number (Wound Clinic Only): 3  Primary Wound Type: Diabetic Ulcer  Location: Foot  Wound Location Orientation: Left;Lateral      Assessments 5/16/2024  2:17 PM 5/23/2024   1:34 PM   Wound Image       Site Assessment Dry;Red;Yellow;Brown Dry;Red;Yellow;Brown   Closure Not approximated Not approximated   Drainage Amount Scant Scant   Drainage Description Serous;Yellow Serous;Yellow   Treatments Cleansed;Saline Cleansed;Saline   Dressing 4x4s;Gauze;Kerlix roll;Tape 4x4s;Gauze;Kerlix roll;Tape   Dressing Changed New Changed   Dressing Status Clean;Dry;Intact Dressing Changed   Wound Length (cm) 0.9 cm 0.9 cm   Wound Width (cm) 1 cm 1 cm   Wound Surface Area (cm^2) 0.9 cm^2 0.9 cm^2   Wound Depth (cm) 0.5 cm 0.7 cm   Wound Volume (cm^3) 0.45 cm^3 0.63 cm^3   Wound Healing % -- -40   Margins Not attached Not attached   Marcy-wound Assessment Dry;Red Dry;Red   Wound Granulation Tissue Red Red   Wound Bed Granulation (%) 70 % 80 %   Wound Bed Epithelium (%) 0 % 0 %   Wound Bed Slough (%) 30 % 20 %   Wound Bed Eschar (%) 0 % 0 %   Wound Bed Fibrin (%) 0 % 0 %   State of Healing Non-healing Non-healing   Wound Odor Strong Strong   Tunneling? No No   Undermining? Yes Yes   Number of Undermines 1 1   Undermine 1 Start Position 3 3   Undermine 1 End Position 6 6   Nunn Scale Grade 2 Grade 2       Active Orders   Date Order Priority Status Authorizing Provider   05/18/24 [id number] OP Wound Dressing Routine Active Maite Goddard APRN     - Cleansing:    Cleanse with normal saline or wound cleanser     - Dressing:    Dry gauze     - Dressing:    Kerlix     - Additional Wound Dressing Information:    Dakin's solution 0.125% solution moist to dry dressing     - Frequency:    Change dressing daily and PRN      ASSESSMENT AND PLAN:      1. Diabetic ulcer of left midfoot associated with type 2 diabetes mellitus, with fat layer exposed ([facility])    2. Dry gangrene ([facility])    3. Chronic foot pain, right    4. Uncontrolled type 2 diabetes mellitus with hyperglycemia, without long-term current use of insulin ([facility])    5. Tobacco dependence with current use    6. PAD (peripheral artery disease) ([facility])    Here today for  follow up on DFUs on the lateral and plantar aspect of his left foot. He is s/p L TMA 11/28/21. Pt reports wounds have been present for about 4 months. The right foot was evaluated last week as well, dry gangrene of the right 4th toes, no open wound. Xray of right foot with chronic OM. There is no erythema, swelling, increased pain or fever or other sx of skin infection. He has palpaple PT/DP pulses of both feet with biphasic wave sounds. Given hx of PAD and no follow up after right LE angiogram with intervention 2/2023 at [facility], recommended he contact his PCP for vascular surgery referral. Additionally, recommend podiatry evaluation, encouraged him to schedule appt.   Patient didn't receive wound care supplies yet, has been dressing wounds with gauze and betadine.   The wound measurements are the same as last week. Wound beds are granular with small amount of slough, periwound with thick callus and hyperkeratosis. Wound odor present. Wound bed was mechanically debrided with dry gauze and periwound callus trimmed with scissors. Wounds were soaked with Dakin's 0.125% for 10 minutes. Recommended he start wet to moist dressing using Dakin's 0.125% with gauze twice daily--should have supplies by Monday and reminded him to fill Rx for Dakin's at [facility].   Reviewed the process of wound healing, creating/maintaining a moist, clean environment for wound healing  Again discussed hindering factors for wound healing that include uncontrolled T2DM, tobacco use, PAD and peripheral neuropathy. Encouraged smoking cessation and diabetes control per PCP recommendations. Handout on diabetic foot care was provided for patient to read.   Reviewed signs and symptoms of infection, encouraged patient to assess skin daily, seek immediate care if he develops any s/sx of infection.  Continue nutrition for wound healing through reduced carbohydrate intake, increased protein intake, and healthy diet. Recommended increased vitamin intake (either  with foods or vitamin supplements), need vitamin A, Bs, C, D and zinc for wound healing.   Risks, benefits, and alternatives of current treatment plan discussed in detail.  Questions and concerns addressed. Red flags to RTC or ED reviewed.  Patient agrees to plan.      Return in about 2 weeks (around 6/6/2024) for APN visit x 30 mins.    I spent 24 minutes with the patient. This time included:  preparing to see the patient (eg, review notes and recent diagnostics), seeing the patient, performing a medically appropriate examination and/or evaluation, counseling and educating the patient, and documenting in the record.    NOTE TO PATIENT: The 21st Century Cures Act makes clinical notes like these available to patients in the interest of transparency. Clinical notes are medical documents used by physicians and care providers to communicate with each other. These documents include medical language and terminology, abbreviations, and treatment information that may sound technical and at times possibly unfamiliar. In addition, at times, the verbiage may appear blunt or direct. These documents are one tool providers use to communicate relevant information and clinical opinions of the care providers in a way that allows common understanding of the clinical context.

## 2024-05-29 ENCOUNTER — APPOINTMENT (OUTPATIENT)
Dept: WOUND CARE | Facility: HOSPITAL | Age: 59
End: 2024-05-29
Attending: NURSE PRACTITIONER
Payer: MEDICARE

## 2024-06-04 NOTE — PROGRESS NOTES
Chief Complaint   Patient presents with    Wound Recheck     Lt foot toes 1-5. Pt has not checked Bs today      HPI:   6/5/24: F/u left DFU. Had difficulty filling Rx for Dakin's. Says he picked up at a specialty pharmacy yesterday, 1/2 strength instead of 1/4 strength. Reports he has been doing well, denies change in health since last visit. Has a follow up visit with his PCP tomorrow, will request updated referrals for [place] [facility] and vascular surgery.     5/23/24: F/u visit for left foot wounds. Didn't  Dakin's solution after last visit and hasn't received gauze and tape from [employer], therefore, says he has continued to apply betadine and gauze to wounds. Denies change in health since last visit, no new concerns.    5/16/24: 59 yr old male here today for chronic wound on left foot. His current medical problems include uncontrolled T2DM (A1c 15.5% on 5/13/24), diabetic neuropathy, tobacco use (1/2 PPD), HLD, PAD. 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 He underwent RLE angiogram with intervention 2/20/23. He reports he had been seeing a podiatrist regularly until his insurance changed this year. He noticed wounds developed on his left foot about 4 months ago. He  has been applying betadine and covering with gauze almost every day. He denies drainage from the right foot and has noticed the skin changes over the past year as well. He has chronic intermittent pain in both feet for which he reports he previously received [place] from his podiatrist. He used to wear a boot to offload wounds, no longer has it. He tries to walk on his heel as much as possible. PCP ordered a cane, but is having a hard time getting from DME company. He lives alone, says he stays with his mother often. He has cut back on tobacco use from 1 PPD to 1/2 PPD. He denies recent alcohol use and denies recent illicit substance use.     Lab Results   Component Value Date    BUN 20 05/13/2024     CREATSERUM 1.50 (H) 05/13/2024    ALB 4.0 05/13/2024    TP 7.8 05/13/2024    A1C  05/13/2024      Comment:      Hemoglobin A1C to be confirmed at [facility]    A1C >15.5 (H) 05/13/2024     Pertinent Imaging/Results (copy/pasted from Care Everywhere--[facility]):    LACIE 1/23/2023  Garry Fournier MD - 01/23/2023   Formatting of this note might be different from the original.   EXAM:   ABIs COMPLETE     HISTORY:   PAD     COMPARISON:   None     TECHNIQUE:   Segmental arterial pressures and Doppler waveforms at three arterial levels in the leg, and toe plethysmography.     FINDINGS:   RIGHT: LACIE 0.61, toe brachial index 0.23, toe pressure 32 mmHg. Brachial pressure 137 mmHg.     Right leg waveforms as follows:   Common femoral: Triphasic   Superficial femoral:  Triphasic   Popliteal:  Triphasic   Posterior tibial:  Monophasic   Dorsalis pedis:  Monophasic     LEFT: LACIE 0.91, toe brachial index was unable to be performed due to amputation. Brachial pressure 130 mmHg.     Left leg waveforms as follows:   Common femoral: Triphasic   Superficial femoral:  Triphasic   Popliteal:  Triphasic   Posterior tibial:  Triphasic   Dorsalis pedis:  Triphasic     IMPRESSION:   1. On the right, there is there is moderate evidence of arterial insufficiency by waveform and LACIE criteria. There is severe pedal arterial insufficiency by TBI criteria (TBI 0.23) and waveform amplitude, suggestive of poor wound healing potential.     2.  On the left, there is mild arterial insufficiency by waveform and TBI criteria. Unable to perform TBI due to amputation..     IR Arteriogram Lower Extremity Unilateral/Bilateral 2/20/23  Impression:  1. Right lower extremity angiogram demonstrates focal moderate popliteal stenosis, moderate stenosis of the proximal to mid peroneal artery and occluded PT occluded reconstituted at the ankle. The AT is occluded with only DP reconstitution.  2. Successful revascularization of the distal popliteal, TPT trunk,  Peroneal artery and posterior tibial artery with POBA.  3. Balloon angioplasty of the peroneal artery, tibioperoneal trunk, and posterior tibial artery. Proximal aspects of each tibial vessel were scoring balloon angioplasty.  4. 4mm [place] DCB treatment of the distal pop extending to the proximal PT.      Current Outpatient Medications:     sodium hypochlorite 0.125 % External Solution, Moisten gauze with solution. Lightly fluff into wound beds. Cover with dry gauze. Secure with gauze roll and tape. Change twice daily., Disp: 237 mL, Rfl: 1    Continuous Glucose Sensor (FREESTYLE BRIDGER 2 SENSOR) Does not apply Misc, Inject 1 Device into the skin every 14 (fourteen) days., Disp: , Rfl:     clopidogrel 75 MG Oral Tab, Take 1 tablet (75 mg total) by mouth nightly., Disp: , Rfl:     aspirin 81 MG Oral Tab EC, Take 1 tablet (81 mg total) by mouth daily., Disp: , Rfl:     gabapentin 300 MG Oral Cap, Take 1 capsule (300 mg total) by mouth 3 (three) times daily., Disp: , Rfl:     glipiZIDE 10 MG Oral Tab, Take 1 tablet (10 mg total) by mouth 2 (two) times daily before meals., Disp: , Rfl:     HYDROcodone-acetaminophen 5-325 MG Oral Tab, Take 1 tablet by mouth every 12 (twelve) hours as needed for Pain., Disp: , Rfl:     metFORMIN HCl 1000 MG Oral Tab, Take 1 tablet (1,000 mg total) by mouth 2 (two) times daily., Disp: , Rfl:     simvastatin 20 MG Oral Tab, Take 1 tablet (20 mg total) by mouth daily., Disp: 90 tablet, Rfl: 3    Misc. Devices (CANE) Does not apply Misc, To use daily for ambulation Dx code Z89.422, Disp: 1 each, Rfl: 0    nicotine 14 MG/24HR Transdermal Patch 24 Hr, Place 1 patch onto the skin daily., Disp: 28 each, Rfl: 3    Allergies   Allergen Reactions    Ace Inhibitors UNKNOWN      REVIEW OF SYSTEMS:   Review of Systems   Constitutional:  Negative for activity change, chills and fever.   Respiratory:  Negative for shortness of breath.    Cardiovascular:  Negative for chest pain and leg swelling.    Gastrointestinal:  Negative for abdominal pain, diarrhea and vomiting.   Musculoskeletal:  Positive for arthralgias and gait problem.   Skin:  Positive for wound. Negative for rash.   Neurological:  Negative for dizziness, numbness and headaches.      PHYSICAL EXAM:   /89   Pulse 94   Temp 98.3 °F (36.8 °C) (Oral)   Resp 18   SpO2 95%    Estimated body mass index is 28.21 kg/m² as calculated from the following:    Height as of 5/16/24: 72\".    Weight as of 5/16/24: 208 lb.   Vital signs reviewed.Appears stated age, well groomed.    Physical Exam  Constitutional:       General: He is not in acute distress.     Appearance: Normal appearance. He is obese. He is not ill-appearing.   HENT:      Head: Normocephalic and atraumatic.   Cardiovascular:      Pulses:           Dorsalis pedis pulses are 2+ on the right side and 2+ on the left side.        Posterior tibial pulses are 2+ on the right side and 2+ on the left side.   Pulmonary:      Effort: Pulmonary effort is normal.   Musculoskeletal:      Right foot: Deformity present.      Left foot: Deformity present.      Right Lower Extremity: Right leg is amputated below ankle. (Right 5th toe)     Left Lower Extremity: Left leg is amputated below ankle. (TMA)  Feet:      Right foot:      Skin integrity: Skin breakdown (Dry gangrene of right 4th & 5th toes), callus, dry skin and fissure present. No erythema or warmth.      Toenail Condition: Right toenails are abnormally thick.      Left foot:      Skin integrity: Ulcer, callus and dry skin present. No erythema or warmth.      Toenail Condition: Left toenails are abnormally thick.      Comments: B/l PT and DP with biphasic wave sounds using handheld doppler. Hair present on toes.    Skin:     General: Skin is warm and dry.      Capillary Refill: Capillary refill takes 2 to 3 seconds.      Findings: Wound (left foot) present.   Neurological:      General: No focal deficit present.      Mental Status: He is alert  and oriented to person, place, and time.   Psychiatric:         Mood and Affect: Mood normal.         Behavior: Behavior normal.        Wound 05/16/24 2 Foot Left;Plantar (Active)   Date First Assessed/Time First Assessed: 05/16/24 1408    Wound Number (Wound Clinic Only): 2  Primary Wound Type: Amputation  Location: Foot  Wound Location Orientation: Left;Plantar      Assessments 5/16/2024  2:17 PM 6/5/2024  1:08 PM   Wound Image          Site Assessment Fragile;Moist;Red;Yellow;Brown Fragile;Moist;Red;Yellow;Brown   Closure Not approximated Not approximated   Drainage Amount Small Small   Drainage Description Serosanguineous Serosanguineous   Treatments Cleansed;Saline Cleansed;Saline   Dressing 4x4s;Gauze;Kerlix roll;Tape 4x4s;Gauze;Kerlix roll;Tape   Dressing Changed New Changed   Dressing Status Clean;Dry;Intact Dressing Changed   Wound Length (cm) 2.2 cm 2 cm   Wound Width (cm) 2.5 cm 2.3 cm   Wound Surface Area (cm^2) 5.5 cm^2 4.6 cm^2   Wound Depth (cm) 0.8 cm 0.7 cm   Wound Volume (cm^3) 4.4 cm^3 3.22 cm^3   Wound Healing % -- 27   Margins Undefined edges Undefined edges   Marcy-wound Assessment Dry;Black;Callous Dry;Black;Callous   Wound Granulation Tissue Red Red   Wound Bed Granulation (%) 80 % 90 %   Wound Bed Epithelium (%) 0 % 0 %   Wound Bed Slough (%) 20 % 10 %   Wound Bed Eschar (%) 0 % 0 %   Wound Bed Fibrin (%) 0 % 0 %   State of Healing Early/partial granulation Early/partial granulation   Wound Odor Strong Strong   Undermining? Yes Yes   Number of Undermines 1 1   Undermine 1 Start Position 2 2   Undermine 1 End Position 3 3   Nunn Scale Grade 2 Grade 2       Active Orders   Date Order Priority Status Authorizing Provider   05/18/24 2651 OP Wound Dressing Routine Active Maite Goddard APRN     - Cleansing:    Cleanse with normal saline or wound cleanser     - Dressing:    Dry gauze     - Dressing:    Kerlix     - Additional Wound Dressing Information:    Dakin's solution 0.125% solution  moist to dry dressing     - Frequency:    Change dressing daily and PRN       Wound 05/16/24 3 Foot Left;Lateral (Active)   Date First Assessed: 05/16/24    Wound Number (Wound Clinic Only): 3  Primary Wound Type: Diabetic Ulcer  Location: Foot  Wound Location Orientation: Left;Lateral      Assessments 5/16/2024  2:17 PM 6/5/2024  1:08 PM   Wound Image        Site Assessment Dry;Red;Yellow;Brown Dry;Red;Yellow;Brown   Closure Not approximated Not approximated   Drainage Amount Scant Scant   Drainage Description Serous;Yellow Serous;Yellow   Treatments Cleansed;Saline Cleansed;Saline   Dressing 4x4s;Gauze;Kerlix roll;Tape 4x4s;Gauze;Kerlix roll;Tape   Dressing Changed New Changed   Dressing Status Clean;Dry;Intact Dressing Changed   Wound Length (cm) 0.9 cm 0.5 cm   Wound Width (cm) 1 cm 1.2 cm   Wound Surface Area (cm^2) 0.9 cm^2 0.6 cm^2   Wound Depth (cm) 0.5 cm 1.3 cm   Wound Volume (cm^3) 0.45 cm^3 0.78 cm^3   Wound Healing % -- -73   Margins Not attached Not attached   Marcy-wound Assessment Dry;Red;Black;Callous Dry;Red;Black;Callous   Wound Granulation Tissue Red Red   Wound Bed Granulation (%) 70 % 90 %   Wound Bed Epithelium (%) 0 % 0 %   Wound Bed Slough (%) 30 % 10 %   Wound Bed Eschar (%) 0 % 0 %   Wound Bed Fibrin (%) 0 % 0 %   State of Healing Non-healing Non-healing   Wound Odor Strong Strong   Tunneling? No No   Undermining? Yes Yes   Number of Undermines 1 1   Undermine 1 Start Position 3 3   Undermine 1 End Position 6 9   Nunn Scale Grade 2 Grade 2       Active Orders   Date Order Priority Status Authorizing Provider   05/18/24 2213 OP Wound Dressing Routine Active Maite Goddard APRN     - Cleansing:    Cleanse with normal saline or wound cleanser     - Dressing:    Dry gauze     - Dressing:    Kerlix     - Additional Wound Dressing Information:    Dakin's solution 0.125% solution moist to dry dressing     - Frequency:    Change dressing daily and PRN     Debridement Amputation Left;Plantar  Foot   Wound 05/16/24 2 Foot Left;Plantar    Performed by: Maite Goddard APRN  Authorized by: Maite Goddard APRN      Consent   Consent obtained? verbal  Consent given by: patient  Risks discussed? procedural risks discussed  Time out called at 6/5/2024 1:15 PM  Immediately prior to the procedure a time out was called and the performing provider verified the correct patient, procedure, equipment, support staff, and site/side marked as required.    Debridement Details  Performed by: NP  Debridement type: conservative sharp    Pre-debridement measurements  Length (cm): 2  Width (cm): 2.3  Depth (cm): 0.7  Surface Area (cm^2): 4.6    Post-debridement measurements  Length (cm): 2  Width (cm): 2.3  Depth (cm): 0.7  Percent debrided: 30%  Surface Area (cm^2): 4.6  Area Debrided (cm^2): 1.38  Volume (cm^3): 3.22    Devitalized tissue debrided: biofilm and callus  Instrument(s) utilized: blade and forceps  Bleeding: none  Hemostasis obtained with: not applicable  Procedural pain (0-10): insensate  Post-procedural pain: insensate   Response to treatment: procedure was tolerated well    Debridement Diabetic Ulcer Left;Lateral Foot   Wound 05/16/24 3 Foot Left;Lateral    Performed by: Maite Goddard APRN  Authorized by: Maite Goddard APRN      Consent   Consent obtained? verbal  Consent given by: patient  Risks discussed? procedural risks discussed  Time out called at 6/5/2024 1:20 PM  Immediately prior to the procedure a time out was called and the performing provider verified the correct patient, procedure, equipment, support staff, and site/side marked as required.    Debridement Details  Performed by: NP  Debridement type: conservative sharp    Pre-debridement measurements  Length (cm): 0.5  Width (cm): 1.2  Depth (cm): 1.3  Surface Area (cm^2): 0.6    Post-debridement measurements  Length (cm): 0.5  Width (cm): 1.2  Depth (cm): 1.3  Percent debrided: 30%  Surface Area (cm^2): 0.6  Area Debrided (cm^2):  0.18  Volume (cm^3): 0.78    Devitalized tissue debrided: biofilm and necrotic debris  Instrument(s) utilized: blade and forceps  Bleeding: none  Hemostasis obtained with: not applicable  Procedural pain (0-10): insensate  Post-procedural pain: insensate   Response to treatment: procedure was tolerated well      ASSESSMENT AND PLAN:      1. Diabetic ulcer of left midfoot associated with type 2 diabetes mellitus, with fat layer exposed ([facility])    2. Dry gangrene ([facility])    3. Chronic foot pain, right    4. Uncontrolled type 2 diabetes mellitus with hyperglycemia, without long-term current use of insulin ([facility])    5. Tobacco dependence with current use    6. PAD (peripheral artery disease) ([facility])    Here today for follow up on DFUs on the lateral and plantar aspect of his left foot. He is s/p L TMA 11/28/21. Pt reports wounds have been present for about 4 months. The right foot was rechecked today, dry gangrene remains present, no open wounds. Xray of right foot with chronic OM. There is no erythema, swelling, increased pain or fever or other sx of skin infection. He has palpaple PT/DP pulses of both feet with biphasic wave sounds. Hindering factors for wound healing that include uncontrolled T2DM, tobacco use, PAD and peripheral neuropathy.  The left plantar wound measurements are slightly improved, lateral wound with increased depth over past week. Wound beds are primarily granular, periwound has callus and hyperkeratosis that has loosened and was removed using forceps and 15 blade (see procedure note). Wound odor remains present. Wound bed was mechanically debrided with dry gauze. Continue wet to moist dressing using Dakin's 0.125% with gauze twice daily and will re-evaluate in 1 week (of note, started this dressing yesterday).    Continue to encourage smoking cessation and diabetes control per PCP recommendations.   He was advised to continue to monitor for signs and symptoms of infection, encouraged him to assess skin  daily, seek immediate care if he develops any s/sx of infection.  Has appointment with PCP tomorrow and will request referral for [place] [facility] and vascular surgery  Continue nutrition for wound healing through reduced carbohydrate intake, increased protein intake, and healthy diet. Recommended increased vitamin intake (either with foods or vitamin supplements), need vitamin A, Bs, C, D and zinc for wound healing.   Risks, benefits, and alternatives of current treatment plan discussed in detail.  Questions and concerns addressed. Red flags to RTC or ED reviewed.  Patient agrees to plan.      Return in about 1 week (around 6/12/2024) for APN visit x 30 mins.    I spent 24 minutes with the patient. This time included:  preparing to see the patient (eg, review notes and recent diagnostics), seeing the patient, performing a medically appropriate examination and/or evaluation, counseling and educating the patient, and documenting in the record.    NOTE TO PATIENT: The 21st Century Cures Act makes clinical notes like these available to patients in the interest of transparency. Clinical notes are medical documents used by physicians and care providers to communicate with each other. These documents include medical language and terminology, abbreviations, and treatment information that may sound technical and at times possibly unfamiliar. In addition, at times, the verbiage may appear blunt or direct. These documents are one tool providers use to communicate relevant information and clinical opinions of the care providers in a way that allows common understanding of the clinical context.

## 2024-06-05 ENCOUNTER — OFFICE VISIT (OUTPATIENT)
Dept: WOUND CARE | Facility: HOSPITAL | Age: 59
End: 2024-06-05
Attending: NURSE PRACTITIONER
Payer: COMMERCIAL

## 2024-06-05 VITALS
DIASTOLIC BLOOD PRESSURE: 89 MMHG | HEART RATE: 94 BPM | SYSTOLIC BLOOD PRESSURE: 157 MMHG | RESPIRATION RATE: 18 BRPM | OXYGEN SATURATION: 95 % | TEMPERATURE: 98 F

## 2024-06-05 DIAGNOSIS — I96 DRY GANGRENE (HCC): ICD-10-CM

## 2024-06-05 DIAGNOSIS — E11.65 UNCONTROLLED TYPE 2 DIABETES MELLITUS WITH HYPERGLYCEMIA, WITHOUT LONG-TERM CURRENT USE OF INSULIN (HCC): ICD-10-CM

## 2024-06-05 DIAGNOSIS — M79.671 CHRONIC FOOT PAIN, RIGHT: ICD-10-CM

## 2024-06-05 DIAGNOSIS — L97.422 DIABETIC ULCER OF LEFT MIDFOOT ASSOCIATED WITH TYPE 2 DIABETES MELLITUS, WITH FAT LAYER EXPOSED (HCC): Primary | ICD-10-CM

## 2024-06-05 DIAGNOSIS — E11.621 DIABETIC ULCER OF LEFT MIDFOOT ASSOCIATED WITH TYPE 2 DIABETES MELLITUS, WITH FAT LAYER EXPOSED (HCC): Primary | ICD-10-CM

## 2024-06-05 DIAGNOSIS — I73.9 PAD (PERIPHERAL ARTERY DISEASE) (HCC): ICD-10-CM

## 2024-06-05 DIAGNOSIS — G89.29 CHRONIC FOOT PAIN, RIGHT: ICD-10-CM

## 2024-06-05 DIAGNOSIS — F17.200 TOBACCO DEPENDENCE WITH CURRENT USE: ICD-10-CM

## 2024-06-05 PROCEDURE — 97597 DBRDMT OPN WND 1ST 20 CM/<: CPT | Performed by: NURSE PRACTITIONER

## 2024-06-06 ENCOUNTER — OFFICE VISIT (OUTPATIENT)
Facility: CLINIC | Age: 59
End: 2024-06-06
Payer: COMMERCIAL

## 2024-06-06 ENCOUNTER — LAB ENCOUNTER (OUTPATIENT)
Dept: LAB | Facility: REFERENCE LAB | Age: 59
End: 2024-06-06
Attending: FAMILY MEDICINE
Payer: COMMERCIAL

## 2024-06-06 VITALS
WEIGHT: 211.25 LBS | SYSTOLIC BLOOD PRESSURE: 120 MMHG | DIASTOLIC BLOOD PRESSURE: 60 MMHG | BODY MASS INDEX: 29 KG/M2 | HEART RATE: 102 BPM | OXYGEN SATURATION: 97 %

## 2024-06-06 DIAGNOSIS — E11.65 TYPE 2 DIABETES MELLITUS WITH HYPERGLYCEMIA, WITHOUT LONG-TERM CURRENT USE OF INSULIN (HCC): Primary | ICD-10-CM

## 2024-06-06 DIAGNOSIS — L97.425 DIABETIC ULCER OF LEFT HEEL ASSOCIATED WITH TYPE 2 DIABETES MELLITUS, WITH MUSCLE INVOLVEMENT WITHOUT EVIDENCE OF NECROSIS (HCC): ICD-10-CM

## 2024-06-06 DIAGNOSIS — R74.8 ELEVATED ALKALINE PHOSPHATASE LEVEL: ICD-10-CM

## 2024-06-06 DIAGNOSIS — N18.30 STAGE 3 CHRONIC KIDNEY DISEASE DUE TO DIABETES MELLITUS (HCC): ICD-10-CM

## 2024-06-06 DIAGNOSIS — E11.621 DIABETIC ULCER OF LEFT HEEL ASSOCIATED WITH TYPE 2 DIABETES MELLITUS, WITH MUSCLE INVOLVEMENT WITHOUT EVIDENCE OF NECROSIS (HCC): ICD-10-CM

## 2024-06-06 DIAGNOSIS — E11.65 TYPE 2 DIABETES MELLITUS WITH HYPERGLYCEMIA, WITHOUT LONG-TERM CURRENT USE OF INSULIN (HCC): ICD-10-CM

## 2024-06-06 DIAGNOSIS — E11.29 MICROALBUMINURIA DUE TO TYPE 2 DIABETES MELLITUS (HCC): ICD-10-CM

## 2024-06-06 DIAGNOSIS — Z89.422 HX OF AMPUTATION OF LESSER TOE, LEFT (HCC): ICD-10-CM

## 2024-06-06 DIAGNOSIS — N18.31 STAGE 3A CHRONIC KIDNEY DISEASE (CKD) (HCC): ICD-10-CM

## 2024-06-06 DIAGNOSIS — Z89.421 HX OF AMPUTATION OF LESSER TOE, RIGHT (HCC): ICD-10-CM

## 2024-06-06 DIAGNOSIS — R80.9 MICROALBUMINURIA DUE TO TYPE 2 DIABETES MELLITUS (HCC): ICD-10-CM

## 2024-06-06 DIAGNOSIS — E11.22 STAGE 3 CHRONIC KIDNEY DISEASE DUE TO DIABETES MELLITUS (HCC): ICD-10-CM

## 2024-06-06 PROBLEM — N18.2 STAGE 2 CHRONIC KIDNEY DISEASE DUE TO TYPE 2 DIABETES MELLITUS (HCC): Status: ACTIVE | Noted: 2024-06-06

## 2024-06-06 LAB
CREAT UR-SCNC: 142.6 MG/DL
GGT SERPL-CCNC: 35 U/L
MICROALBUMIN UR-MCNC: 112.3 MG/DL
MICROALBUMIN/CREAT 24H UR-RTO: 787.5 UG/MG (ref ?–30)

## 2024-06-06 PROCEDURE — 36415 COLL VENOUS BLD VENIPUNCTURE: CPT

## 2024-06-06 PROCEDURE — 82977 ASSAY OF GGT: CPT

## 2024-06-06 PROCEDURE — 82043 UR ALBUMIN QUANTITATIVE: CPT

## 2024-06-06 PROCEDURE — 99214 OFFICE O/P EST MOD 30 MIN: CPT | Performed by: FAMILY MEDICINE

## 2024-06-06 PROCEDURE — 82570 ASSAY OF URINE CREATININE: CPT

## 2024-06-06 RX ORDER — BLOOD-GLUCOSE METER
EACH MISCELLANEOUS
Qty: 1 KIT | Refills: 0 | Status: SHIPPED | OUTPATIENT
Start: 2024-06-06

## 2024-06-06 RX ORDER — PEN NEEDLE, DIABETIC 30 GX3/16"
1 NEEDLE, DISPOSABLE MISCELLANEOUS NIGHTLY
Qty: 100 EACH | Refills: 0 | Status: SHIPPED | OUTPATIENT
Start: 2024-06-06

## 2024-06-06 RX ORDER — INSULIN DEGLUDEC 200 U/ML
20 INJECTION, SOLUTION SUBCUTANEOUS NIGHTLY
Qty: 9 ML | Refills: 1 | Status: SHIPPED | OUTPATIENT
Start: 2024-06-06

## 2024-06-06 RX ORDER — LANCETS 30 GAUGE
1 EACH MISCELLANEOUS 3 TIMES DAILY
Qty: 100 EACH | Refills: 3 | Status: SHIPPED | OUTPATIENT
Start: 2024-06-06

## 2024-06-06 RX ORDER — BLOOD SUGAR DIAGNOSTIC
STRIP MISCELLANEOUS
Qty: 100 STRIP | Refills: 1 | Status: SHIPPED | OUTPATIENT
Start: 2024-06-06 | End: 2025-06-06

## 2024-06-06 NOTE — PATIENT INSTRUCTIONS
Please check you blood sugars first thing when you wake up before eating  Also check it before lunch and before dinner    If you are feeling \"off,\" tired, sweaty, etc check you blood sugar. If you notice numbers in the 60s-70s let me know.    For the diet try and cut out even diet soda. Start with 1 day out of the week where you do not drink any soda and only water.

## 2024-06-06 NOTE — PROGRESS NOTES
Subjective:   Yo Villagomez is a 59 year old male who presents for Diabetes (Patient comes to the office to follow up on diabetes. Patient states he needs a referral to see a podiatrist. )     Patient presents for follow up uncontrolled diabetes. Last A1c noted to be 15.5%. Is on metformin 1000 mg BID and glipizide 10 mg for DM. There are mentions of farxiga in previous records from previous provider. Patient is not on farxiga. Patient would also like information of retinal screening    Patient also with significant microalbuminuria since 11/2023. Like due to uncontrolled DM. Patient urinated before visit today so unable to collect.    Elevated alk phos  Patient states was never told of any issues in the past    History/Other:    Chief Complaint Reviewed and Verified  Nursing Notes Reviewed and   Verified  Tobacco Reviewed  Allergies Reviewed  Medications Reviewed    Medical History Reviewed  Surgical History Reviewed  Family History   Reviewed  Social History Reviewed         Tobacco:  Social History     Tobacco Use   Smoking Status Every Day    Current packs/day: 1.00    Average packs/day: 1 pack/day for 15.4 years (15.4 ttl pk-yrs)    Types: Cigarettes    Start date: 2009   Smokeless Tobacco Never     E-Cigarettes/Vaping       Questions Responses    E-Cigarette Use Never User           Tobacco cessation counseling for <3 minutes.      Current Outpatient Medications   Medication Sig Dispense Refill    insulin degludec (TRESIBA FLEXTOUCH) 200 UNIT/ML Subcutaneous Solution Pen-injector Inject 20 Units into the skin nightly. 9 mL 1    Insulin Pen Needle (PEN NEEDLES) 32G X 4 MM Does not apply Misc 1 Needle at bedtime. Use with tresiba nightly E11.65 100 each 0    Blood Glucose Monitoring Suppl (ONETOUCH ULTRA 2) w/Device Does not apply Kit Check glucose three times daily E11.65 1 kit 0    OneTouch UltraSoft 2 Lancets Does not apply Misc 1 Lancet in the morning, at noon, and at bedtime. Check glucose three  times daily E11.65 100 each 3    Glucose Blood (ONETOUCH VERIO) In Vitro Strip Check glucose three times daily E11.65 100 strip 1    sodium hypochlorite 0.125 % External Solution Moisten gauze with solution. Lightly fluff into wound beds. Cover with dry gauze. Secure with gauze roll and tape. Change twice daily. 237 mL 1    Continuous Glucose Sensor (FREESTYLE BRIDGER 2 SENSOR) Does not apply Misc Inject 1 Device into the skin every 14 (fourteen) days.      clopidogrel 75 MG Oral Tab Take 1 tablet (75 mg total) by mouth nightly.      aspirin 81 MG Oral Tab EC Take 1 tablet (81 mg total) by mouth daily.      gabapentin 300 MG Oral Cap Take 1 capsule (300 mg total) by mouth 3 (three) times daily.      glipiZIDE 10 MG Oral Tab Take 1 tablet (10 mg total) by mouth 2 (two) times daily before meals.      metFORMIN HCl 1000 MG Oral Tab Take 1 tablet (1,000 mg total) by mouth 2 (two) times daily.      simvastatin 20 MG Oral Tab Take 1 tablet (20 mg total) by mouth daily. 90 tablet 3    Misc. Devices (CANE) Does not apply Misc To use daily for ambulation Dx code Z89.422 1 each 0    nicotine 14 MG/24HR Transdermal Patch 24 Hr Place 1 patch onto the skin daily. 28 each 3    HYDROcodone-acetaminophen 5-325 MG Oral Tab Take 1 tablet by mouth every 12 (twelve) hours as needed for Pain. (Patient not taking: Reported on 6/6/2024)           Review of Systems:  Review of Systems   Constitutional: Negative.    Respiratory: Negative.     Cardiovascular: Negative.    Gastrointestinal: Negative.    Skin: Negative.    Neurological: Negative.          Objective:   /60 (BP Location: Left arm, Patient Position: Sitting, Cuff Size: adult)   Pulse 102   Wt 211 lb 4 oz (95.8 kg)   SpO2 97%   BMI 28.65 kg/m²  Estimated body mass index is 28.65 kg/m² as calculated from the following:    Height as of 5/16/24: 6' (1.829 m).    Weight as of this encounter: 211 lb 4 oz (95.8 kg).  Physical Exam  Vitals and nursing note reviewed.    Constitutional:       General: He is not in acute distress.     Appearance: Normal appearance.   HENT:      Head: Normocephalic and atraumatic.   Eyes:      General:         Right eye: No discharge.         Left eye: No discharge.      Comments: Extraocular eye movements grossly intact   Pulmonary:      Effort: Pulmonary effort is normal.   Musculoskeletal:      Comments: Normal gait   Skin:     Findings: No rash.   Neurological:      General: No focal deficit present.      Mental Status: He is alert. Mental status is at baseline.   Psychiatric:         Mood and Affect: Mood normal.         Behavior: Behavior normal.         Assessment & Plan:   1. Type 2 diabetes mellitus with hyperglycemia, without long-term current use of insulin (HCC) (Primary)  -     Podiatry Referral  -     Tresiba FlexTouch; Inject 20 Units into the skin nightly.  Dispense: 9 mL; Refill: 1  -     Pen Needles; 1 Needle at bedtime. Use with tresiba nightly E11.65  Dispense: 100 each; Refill: 0  -     DIETITIAN EDUCATION INITIAL, DIET (INTERNAL)  -     OneTouch Ultra 2; Check glucose three times daily E11.65  Dispense: 1 kit; Refill: 0  -     OneTouch UltraSoft 2 Lancets; 1 Lancet in the morning, at noon, and at bedtime. Check glucose three times daily E11.65  Dispense: 100 each; Refill: 3  -     OneTouch Verio; Check glucose three times daily E11.65  Dispense: 100 strip; Refill: 1  -     Microalb/Creat Ratio, Random Urine; Future; Expected date: 06/06/2024  -     Diabetic Retinopathy Exam; Future; Expected date: 06/06/2024    -uncontrolled. Started tresiba 20 units at bedtime  -to continue metformin and glipizide  -did send in glucose testing supplies and counseled patient on values and when to test  -to bring in log of values or glucometer at follow up    2. Stage 3 chronic kidney disease due to diabetes mellitus (HCC)  -renal function worsening compared to 11/2023. Will start jardiance at next visit as to not overwhelm patient with various  medication changes    3. Stage 3a chronic kidney disease (CKD) (HCC)  -renal function worsening compared to 11/2023. Will start jardiance at next visit as to not overwhelm patient with various medication changes    4. Microalbuminuria due to type 2 diabetes mellitus (HCC)  -uncontrolled. significant microalbuminiuria. Had patient leave urine sample today to determine need for nephology referral  -will focus on diabetic control  -will start jardiance at next visit  -will also consider low dose ACE    5. Elevated alkaline phosphatase level  -     GGT (Gamma Glutamyl Transpeptidase); Future; Expected date: 06/06/2024    -ordered GGT  -last alk phos not elevated as outside lab uses limit of normal of 125 which is higher than [facility] lab    6. Hx of amputation of lesser toe, right (HCC)  -stable  -advised continued follow up with wound clinic  -will work on diabetic control with medications and nutrition counseling above    7. Hx of amputation of lesser toe, left (HCC)  -stable  -advised continued follow up with wound clinic  -will work on diabetic control with medications and nutrition counseling above    8. Diabetic ulcer of left heel associated with type 2 diabetes mellitus, with muscle involvement without evidence of necrosis (HCC)    -improving.   -advised continued follow up with wound clinic  -will work on diabetic control with medications and nutrition counseling above      Return in about 4 weeks (around 7/4/2024) for Diabetes follow up.    Alea Al MD, 6/6/2024, 12:45 PM

## 2024-06-11 ENCOUNTER — OFFICE VISIT (OUTPATIENT)
Dept: WOUND CARE | Facility: HOSPITAL | Age: 59
End: 2024-06-11
Attending: NURSE PRACTITIONER
Payer: COMMERCIAL

## 2024-06-11 VITALS
DIASTOLIC BLOOD PRESSURE: 60 MMHG | HEART RATE: 107 BPM | RESPIRATION RATE: 20 BRPM | TEMPERATURE: 98 F | OXYGEN SATURATION: 97 % | SYSTOLIC BLOOD PRESSURE: 97 MMHG

## 2024-06-11 DIAGNOSIS — L97.422 DIABETIC ULCER OF LEFT MIDFOOT ASSOCIATED WITH TYPE 2 DIABETES MELLITUS, WITH FAT LAYER EXPOSED (HCC): Primary | ICD-10-CM

## 2024-06-11 DIAGNOSIS — E11.621 DIABETIC ULCER OF LEFT MIDFOOT ASSOCIATED WITH TYPE 2 DIABETES MELLITUS, WITH FAT LAYER EXPOSED (HCC): Primary | ICD-10-CM

## 2024-06-11 DIAGNOSIS — F17.200 TOBACCO DEPENDENCE WITH CURRENT USE: ICD-10-CM

## 2024-06-11 DIAGNOSIS — E11.65 UNCONTROLLED TYPE 2 DIABETES MELLITUS WITH HYPERGLYCEMIA, WITHOUT LONG-TERM CURRENT USE OF INSULIN (HCC): ICD-10-CM

## 2024-06-11 DIAGNOSIS — I73.9 PAD (PERIPHERAL ARTERY DISEASE) (HCC): ICD-10-CM

## 2024-06-11 PROCEDURE — 99213 OFFICE O/P EST LOW 20 MIN: CPT | Performed by: NURSE PRACTITIONER

## 2024-06-11 NOTE — PROGRESS NOTES
Chief Complaint   Patient presents with    Wound Recheck     Patient here for follow up on left foot.  Patient has been changing dressing twice a day with dakins and guaze.  Patient dressings is saturated with drainage with strong odor.  Patient also concerned about swelling in left leg when standing or up on his leg.  He started wearing an old compression sock.  Patient has not checked blood sugar within the last week due to no lancets.  He is ordering some and will pick them up at [facility].       HPI:   6/11/24: F/u left DFU. Saw his PCP last week, says he started on insulin. Received podiatry referral as well, has not scheduled appointment. Did not receive vascular surgery referral. Reports he has been doing well with dressing changes BID for the past week. Reports the swelling in his leg occurs if he is up and about for 3 hours, resolves when he sits and elevates legs. He denies fever, chill, pain. Says Denies new concerns.    6/5/24: F/u left DFU. Had difficulty filling Rx for Dakin's. Says he picked up at a specialty pharmacy yesterday, 1/2 strength instead of 1/4 strength. Reports he has been doing well, denies change in health since last visit. Has a follow up visit with his PCP tomorrow, will request updated referrals for [place] [facility] and vascular surgery.     5/23/24: F/u visit for left foot wounds. Didn't  Dakin's solution after last visit and hasn't received gauze and tape from DME, therefore, says he has continued to apply betadine and gauze to wounds. Denies change in health since last visit, no new concerns.    5/16/24: 59 yr old male here today for chronic wound on left foot. His current medical problems include uncontrolled T2DM (A1c 15.5% on 5/13/24), diabetic neuropathy, tobacco use (1/2 PPD), HLD, PAD. 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 He underwent RLE angiogram with intervention 2/20/23. He reports he had been seeing a podiatrist regularly  until his insurance changed this year. He noticed wounds developed on his left foot about 4 months ago. He  has been applying betadine and covering with gauze almost every day. He denies drainage from the right foot and has noticed the skin changes over the past year as well. He has chronic intermittent pain in both feet for which he reports he previously received [place] from his podiatrist. He used to wear a boot to offload wounds, no longer has it. He tries to walk on his heel as much as possible. PCP ordered a cane, but is having a hard time getting from DME company. He lives alone, says he stays with his mother often. He has cut back on tobacco use from 1 PPD to 1/2 PPD. He denies recent alcohol use and denies recent illicit substance use.     Lab Results   Component Value Date    BUN 20 05/13/2024    CREATSERUM 1.50 (H) 05/13/2024    ALB 4.0 05/13/2024    TP 7.8 05/13/2024    A1C  05/13/2024      Comment:      Hemoglobin A1C to be confirmed at [facility]    A1C >15.5 (H) 05/13/2024     Pertinent Imaging/Results (copy/pasted from Care Everywhere--[facility]):    LACIE 1/23/2023  Garry Fournier MD - 01/23/2023   Formatting of this note might be different from the original.   EXAM:   ABIs COMPLETE     HISTORY:   PAD     COMPARISON:   None     TECHNIQUE:   Segmental arterial pressures and Doppler waveforms at three arterial levels in the leg, and toe plethysmography.     FINDINGS:   RIGHT: LACIE 0.61, toe brachial index 0.23, toe pressure 32 mmHg. Brachial pressure 137 mmHg.     Right leg waveforms as follows:   Common femoral: Triphasic   Superficial femoral:  Triphasic   Popliteal:  Triphasic   Posterior tibial:  Monophasic   Dorsalis pedis:  Monophasic     LEFT: LACIE 0.91, toe brachial index was unable to be performed due to amputation. Brachial pressure 130 mmHg.     Left leg waveforms as follows:   Common femoral: Triphasic   Superficial femoral:  Triphasic   Popliteal:  Triphasic   Posterior tibial:  Triphasic    Dorsalis pedis:  Triphasic     IMPRESSION:   1. On the right, there is there is moderate evidence of arterial insufficiency by waveform and LACIE criteria. There is severe pedal arterial insufficiency by TBI criteria (TBI 0.23) and waveform amplitude, suggestive of poor wound healing potential.     2.  On the left, there is mild arterial insufficiency by waveform and TBI criteria. Unable to perform TBI due to amputation..     IR Arteriogram Lower Extremity Unilateral/Bilateral 2/20/23  Impression:  1. Right lower extremity angiogram demonstrates focal moderate popliteal stenosis, moderate stenosis of the proximal to mid peroneal artery and occluded PT occluded reconstituted at the ankle. The AT is occluded with only DP reconstitution.  2. Successful revascularization of the distal popliteal, TPT trunk, Peroneal artery and posterior tibial artery with POBA.  3. Balloon angioplasty of the peroneal artery, tibioperoneal trunk, and posterior tibial artery. Proximal aspects of each tibial vessel were scoring balloon angioplasty.  4. 4mm [place] DCB treatment of the distal pop extending to the proximal PT.      Current Outpatient Medications:     insulin degludec (TRESIBA FLEXTOUCH) 200 UNIT/ML Subcutaneous Solution Pen-injector, Inject 20 Units into the skin nightly., Disp: 9 mL, Rfl: 1    Insulin Pen Needle (PEN NEEDLES) 32G X 4 MM Does not apply Misc, 1 Needle at bedtime. Use with tresiba nightly E11.65, Disp: 100 each, Rfl: 0    Blood Glucose Monitoring Suppl (ONETOUCH ULTRA 2) w/Device Does not apply Kit, Check glucose three times daily E11.65, Disp: 1 kit, Rfl: 0    OneTouch UltraSoft 2 Lancets Does not apply Misc, 1 Lancet in the morning, at noon, and at bedtime. Check glucose three times daily E11.65, Disp: 100 each, Rfl: 3    Glucose Blood (ONETOUCH VERIO) In Vitro Strip, Check glucose three times daily E11.65, Disp: 100 strip, Rfl: 1    sodium hypochlorite 0.125 % External Solution, Moisten gauze with solution.  Lightly fluff into wound beds. Cover with dry gauze. Secure with gauze roll and tape. Change twice daily., Disp: 237 mL, Rfl: 1    Continuous Glucose Sensor (FREESTYLE BRIDGER 2 SENSOR) Does not apply Misc, Inject 1 Device into the skin every 14 (fourteen) days., Disp: , Rfl:     clopidogrel 75 MG Oral Tab, Take 1 tablet (75 mg total) by mouth nightly., Disp: , Rfl:     aspirin 81 MG Oral Tab EC, Take 1 tablet (81 mg total) by mouth daily., Disp: , Rfl:     gabapentin 300 MG Oral Cap, Take 1 capsule (300 mg total) by mouth 3 (three) times daily., Disp: , Rfl:     glipiZIDE 10 MG Oral Tab, Take 1 tablet (10 mg total) by mouth 2 (two) times daily before meals., Disp: , Rfl:     HYDROcodone-acetaminophen 5-325 MG Oral Tab, Take 1 tablet by mouth every 12 (twelve) hours as needed for Pain. (Patient not taking: Reported on 6/6/2024), Disp: , Rfl:     metFORMIN HCl 1000 MG Oral Tab, Take 1 tablet (1,000 mg total) by mouth 2 (two) times daily., Disp: , Rfl:     simvastatin 20 MG Oral Tab, Take 1 tablet (20 mg total) by mouth daily., Disp: 90 tablet, Rfl: 3    Misc. Devices (CANE) Does not apply Misc, To use daily for ambulation Dx code Z89.422, Disp: 1 each, Rfl: 0    nicotine 14 MG/24HR Transdermal Patch 24 Hr, Place 1 patch onto the skin daily., Disp: 28 each, Rfl: 3    Allergies   Allergen Reactions    Ace Inhibitors UNKNOWN      REVIEW OF SYSTEMS:   Review of Systems   Constitutional:  Negative for activity change, chills and fever.   Respiratory:  Negative for shortness of breath.    Cardiovascular:  Negative for chest pain and leg swelling.   Gastrointestinal:  Negative for abdominal pain, diarrhea and vomiting.   Musculoskeletal:  Positive for arthralgias and gait problem.   Skin:  Positive for wound. Negative for rash.   Neurological:  Negative for dizziness, numbness and headaches.      PHYSICAL EXAM:   BP 97/60   Pulse 107   Temp 97.8 °F (36.6 °C)   Resp 20   SpO2 97%    Estimated body mass index is 28.65 kg/m²  as calculated from the following:    Height as of 5/16/24: 72\".    Weight as of 6/6/24: 211 lb 4 oz.   Vital signs reviewed.Appears stated age, well groomed.    Physical Exam  Constitutional:       General: He is not in acute distress.     Appearance: Normal appearance. He is obese. He is not ill-appearing.   HENT:      Head: Normocephalic and atraumatic.   Cardiovascular:      Pulses:           Dorsalis pedis pulses are 2+ on the right side and 2+ on the left side.        Posterior tibial pulses are 2+ on the right side and 2+ on the left side.   Pulmonary:      Effort: Pulmonary effort is normal.   Musculoskeletal:      Right foot: Deformity present.      Left foot: Deformity present.      Right Lower Extremity: Right leg is amputated below ankle. (Right 5th toe)     Left Lower Extremity: Left leg is amputated below ankle. (TMA)  Feet:      Right foot:      Skin integrity: Callus and dry skin present. No erythema or warmth. Right foot maceration: Dry gangrene of right 4th & 5th toes.     Toenail Condition: Right toenails are abnormally thick.      Left foot:      Skin integrity: Ulcer, callus and dry skin present. No erythema or warmth.      Toenail Condition: Left toenails are abnormally thick.      Comments: B/l PT and DP with biphasic wave sounds using handheld doppler. Hair present on toes.    Skin:     General: Skin is warm and dry.      Capillary Refill: Capillary refill takes 2 to 3 seconds.      Findings: Wound (left foot) present.   Neurological:      General: No focal deficit present.      Mental Status: He is alert and oriented to person, place, and time.   Psychiatric:         Mood and Affect: Mood normal.         Behavior: Behavior normal.        Wound 05/16/24 2 Foot Left;Plantar (Active)   Date First Assessed/Time First Assessed: 05/16/24 5873    Wound Number (Wound Clinic Only): 2  Primary Wound Type: Amputation  Location: Foot  Wound Location Orientation: Left;Plantar      Assessments 5/16/2024   2:17 PM 6/11/2024  1:00 PM   Wound Image         Site Assessment Fragile;Moist;Red;Yellow;Brown Fragile;Moist;Red;Yellow;Brown   Closure Not approximated Not approximated   Drainage Amount Small Large   Drainage Description Serosanguineous Brown   Treatments Cleansed;Saline Cleansed;Saline   Dressing 4x4s;Gauze;Kerlix roll;Tape 4x4s;Gauze;Tape   Dressing Changed New Changed   Dressing Status Clean;Dry;Intact Dressing Changed   Wound Length (cm) 2.2 cm 2 cm   Wound Width (cm) 2.5 cm 2 cm   Wound Surface Area (cm^2) 5.5 cm^2 4 cm^2   Wound Depth (cm) 0.8 cm 0.6 cm   Wound Volume (cm^3) 4.4 cm^3 2.4 cm^3   Wound Healing % -- 45   Margins Undefined edges --   Marcy-wound Assessment Dry;Black;Callous Dry;Black;Callous   Wound Granulation Tissue Red Red   Wound Bed Granulation (%) 80 % 85 %   Wound Bed Epithelium (%) 0 % 0 %   Wound Bed Slough (%) 20 % 15 %   Wound Bed Eschar (%) 0 % 0 %   Wound Bed Fibrin (%) 0 % 0 %   State of Healing Early/partial granulation Non-healing   Wound Odor Strong Strong   Undermining? Yes Yes   Number of Undermines 1 1   Undermine 1 Start Position 2 10   Undermine 1 End Position 3 3   Nunn Scale Grade 2 Grade 2       Active Orders   Date Order Priority Status Authorizing Provider   05/18/24 2213 OP Wound Dressing Routine Active Maite Goddard APRN     - Cleansing:    Cleanse with normal saline or wound cleanser     - Dressing:    Dry gauze     - Dressing:    Kerlix     - Additional Wound Dressing Information:    Dakin's solution 0.125% solution moist to dry dressing     - Frequency:    Change dressing daily and PRN       Inactive Orders   Date Order Priority Status Authorizing Provider   06/05/24 1627 Debridement Amputation Left;Plantar Foot Routine Completed Maite Goddard APRN       Wound 05/16/24 3 Foot Left;Lateral (Active)   Date First Assessed: 05/16/24    Wound Number (Wound Clinic Only): 3  Primary Wound Type: Diabetic Ulcer  Location: Foot  Wound Location Orientation:  Left;Lateral      Assessments 5/16/2024  2:17 PM 6/11/2024  1:00 PM   Wound Image       Site Assessment Dry;Red;Yellow;Brown Dry;Red;Yellow;Brown   Closure Not approximated Not approximated   Drainage Amount Scant Large   Drainage Description Serous;Yellow Brown   Treatments Cleansed;Saline Cleansed;Saline   Dressing 4x4s;Gauze;Kerlix roll;Tape 4x4s;Gauze;Tape   Dressing Changed New Changed   Dressing Status Clean;Dry;Intact Dressing Changed   Wound Length (cm) 0.9 cm 0.9 cm   Wound Width (cm) 1 cm 1.2 cm   Wound Surface Area (cm^2) 0.9 cm^2 1.08 cm^2   Wound Depth (cm) 0.5 cm 0.6 cm   Wound Volume (cm^3) 0.45 cm^3 0.648 cm^3   Wound Healing % -- -44   Margins Not attached Not attached   Marcy-wound Assessment Dry;Red;Black;Callous Dry;Red;Black;Callous   Wound Granulation Tissue Red Red   Wound Bed Granulation (%) 70 % 85 %   Wound Bed Epithelium (%) 0 % --   Wound Bed Slough (%) 30 % 15 %   Wound Bed Eschar (%) 0 % --   Wound Bed Fibrin (%) 0 % --   State of Healing Non-healing Non-healing   Wound Odor Strong Strong   Tunneling? No No   Undermining? Yes Yes   Number of Undermines 1 1   Undermine 1 Start Position 3 5   Undermine 1 End Position 6 8   Nunn Scale Grade 2 Grade 2       Active Orders   Date Order Priority Status Authorizing Provider   05/18/24 2213 OP Wound Dressing Routine Active Maite Goddard APRN     - Cleansing:    Cleanse with normal saline or wound cleanser     - Dressing:    Dry gauze     - Dressing:    Kerlix     - Additional Wound Dressing Information:    Dakin's solution 0.125% solution moist to dry dressing     - Frequency:    Change dressing daily and PRN       Inactive Orders   Date Order Priority Status Authorizing Provider   06/05/24 1629 Debridement Diabetic Ulcer Left;Lateral Foot Routine Completed Maite Goddard APRN     Procedures  ASSESSMENT AND PLAN:      1. Diabetic ulcer of left midfoot associated with type 2 diabetes mellitus, with fat layer exposed (HCC)  - Aerobic  Bacterial Culture  - Aerobic Bacterial Culture    2. Uncontrolled type 2 diabetes mellitus with hyperglycemia, without long-term current use of insulin (HCC)  - Aerobic Bacterial Culture  - Aerobic Bacterial Culture    3. Tobacco dependence with current use    4. PAD (peripheral artery disease) (HCC)    Here today for follow up on DFUs of the lateral and plantar aspect of his left foot. He is s/p L TMA 11/28/21. Wounds have been present for about 4.5 months. There is no erythema, swelling, increased pain or fever or other sx of skin infection. He has palpaple PT/DP pulses of both feet with biphasic wave sounds. Hindering factors for wound healing that include uncontrolled T2DM, tobacco use, PAD and peripheral neuropathy.  Has been performing wet to moist Dakin's 0.05% dressing BID x 1 week. The left plantar wound measurements are similar to last week, slightly improved. Wound odor remains present, decreased from last week, culture taken to evaluate for underlying bioburden that may be delaying wound healing. Wound beds are primarily granular, periwound has callus and hyperkeratosis that has improved since last week.    Continued to encourage smoking cessation and diabetes control per PCP recommendations.   He was advised to continue to monitor for signs and symptoms of infection, encouraged him to assess skin daily, seek immediate care if he develops any s/sx of infection.  Encouraged him to schedule appointment with podiatry, also recommend he re-establish care with vascular surgery  Continue nutrition for wound healing through reduced carbohydrate intake, increased protein intake, and healthy diet. Recommended increased vitamin intake (either with foods or vitamin supplements), need vitamin A, Bs, C, D and zinc for wound healing.   Risks, benefits, and alternatives of current treatment plan discussed in detail.  Questions and concerns addressed. Red flags to RTC or ED reviewed.  Patient agrees to plan.      Return  in about 1 week (around 6/18/2024) for APN visit x 30 mins.    I spent 24 minutes with the patient. This time included:  preparing to see the patient (eg, review notes and recent diagnostics), seeing the patient, performing a medically appropriate examination and/or evaluation, counseling and educating the patient, and documenting in the record.    NOTE TO PATIENT: The 21st Century Cures Act makes clinical notes like these available to patients in the interest of transparency. Clinical notes are medical documents used by physicians and care providers to communicate with each other. These documents include medical language and terminology, abbreviations, and treatment information that may sound technical and at times possibly unfamiliar. In addition, at times, the verbiage may appear blunt or direct. These documents are one tool providers use to communicate relevant information and clinical opinions of the care providers in a way that allows common understanding of the clinical context.

## 2024-06-14 DIAGNOSIS — T14.8XXA WOUND INFECTION: Primary | ICD-10-CM

## 2024-06-14 DIAGNOSIS — L08.9 WOUND INFECTION: Primary | ICD-10-CM

## 2024-06-14 RX ORDER — SULFAMETHOXAZOLE AND TRIMETHOPRIM 800; 160 MG/1; MG/1
1 TABLET ORAL 2 TIMES DAILY
Qty: 14 TABLET | Refills: 0 | Status: SHIPPED | OUTPATIENT
Start: 2024-06-14 | End: 2024-06-21

## 2024-06-19 ENCOUNTER — APPOINTMENT (OUTPATIENT)
Dept: WOUND CARE | Facility: HOSPITAL | Age: 59
End: 2024-06-19
Attending: NURSE PRACTITIONER
Payer: COMMERCIAL

## 2024-06-27 ENCOUNTER — TELEPHONE (OUTPATIENT)
Facility: CLINIC | Age: 59
End: 2024-06-27

## 2024-06-27 DIAGNOSIS — E11.65 TYPE 2 DIABETES MELLITUS WITH HYPERGLYCEMIA, WITHOUT LONG-TERM CURRENT USE OF INSULIN (HCC): Primary | ICD-10-CM

## 2024-09-04 DIAGNOSIS — E11.65 TYPE 2 DIABETES MELLITUS WITH HYPERGLYCEMIA, WITHOUT LONG-TERM CURRENT USE OF INSULIN (HCC): ICD-10-CM

## 2024-09-06 DIAGNOSIS — E11.65 TYPE 2 DIABETES MELLITUS WITH HYPERGLYCEMIA, WITHOUT LONG-TERM CURRENT USE OF INSULIN (HCC): ICD-10-CM

## 2024-09-06 RX ORDER — PEN NEEDLE, DIABETIC 30 GX3/16"
1 NEEDLE, DISPOSABLE MISCELLANEOUS NIGHTLY
Qty: 100 EACH | Refills: 0 | OUTPATIENT
Start: 2024-09-06

## 2024-09-06 RX ORDER — PEN NEEDLE, DIABETIC 32GX 5/32"
NEEDLE, DISPOSABLE MISCELLANEOUS
Qty: 100 EACH | Refills: 3 | Status: SHIPPED | OUTPATIENT
Start: 2024-09-06

## 2024-09-06 NOTE — TELEPHONE ENCOUNTER
Patient states he received a refill for his insulin but they did not dispense the needles. I advised that pharmacy did send a refill request. Medication pended. Routing for protocol.

## 2024-09-06 NOTE — TELEPHONE ENCOUNTER
Refill passed per [facility] protocol.  Requested Prescriptions   Pending Prescriptions Disp Refills    BD PEN NEEDLE HAL 2ND GEN 32G X 4 MM Does not apply Misc [Pharmacy Med Name: BD HAL 2 GEN PEN NDL 32G 4MM] 100 each 2     Si NEEDLE AT BEDTIME. USE WITH TRESIBA NIGHTLY E11.65       Diabetic Supplies Protocol Passed - 2024 10:29 AM        Passed - In person appointment or virtual visit in the past 12 mos or appointment in next 3 mos     Recent Outpatient Visits              2 months ago Diabetic ulcer of left midfoot associated with type 2 diabetes mellitus, with fat layer exposed (HCC)    [facility] Maite Goddard, LYNDSAY    Office Visit    3 months ago Type 2 diabetes mellitus with hyperglycemia, without long-term current use of insulin (HCC)    [facility] Alea Al MD    Office Visit    3 months ago Diabetic ulcer of left midfoot associated with type 2 diabetes mellitus, with fat layer exposed (HCC)    [facility] Maite Goddard, LYNDSAY    Office Visit    3 months ago Diabetic ulcer of left midfoot associated with type 2 diabetes mellitus, with fat layer exposed (HCC)    [facility] Maite Goddard, LYNDSAY    Office Visit    3 months ago Diabetic ulcer of left midfoot associated with type 2 diabetes mellitus, with fat layer exposed (HCC)    [facility] Maite Goddard, APRN    Office Visit                           Recent Outpatient Visits              2 months ago Diabetic ulcer of left midfoot associated with type 2 diabetes mellitus, with fat layer exposed (HCC)    [facility] Maite Goddard, LYNDSAY    Office Visit    3 months ago Type 2 diabetes mellitus with hyperglycemia, without long-term current use of insulin (HCC)    [facility] Alea Al MD    Office Visit    3 months ago  Diabetic ulcer of left midfoot associated with type 2 diabetes mellitus, with fat layer exposed (HCC)    [facility] Maite Goddard, LYNDSAY    Office Visit    3 months ago Diabetic ulcer of left midfoot associated with type 2 diabetes mellitus, with fat layer exposed (HCC)    [facility] Maite Goddard, APRN    Office Visit    3 months ago Diabetic ulcer of left midfoot associated with type 2 diabetes mellitus, with fat layer exposed (HCC)    [facility] Maite Gdodard, LYNDSAY    Office Visit

## 2024-11-12 ENCOUNTER — LAB ENCOUNTER (OUTPATIENT)
Dept: LAB | Facility: REFERENCE LAB | Age: 59
End: 2024-11-12
Attending: FAMILY MEDICINE
Payer: MEDICARE

## 2024-11-12 ENCOUNTER — OFFICE VISIT (OUTPATIENT)
Facility: CLINIC | Age: 59
End: 2024-11-12
Payer: MEDICARE

## 2024-11-12 VITALS
HEIGHT: 72 IN | DIASTOLIC BLOOD PRESSURE: 78 MMHG | SYSTOLIC BLOOD PRESSURE: 126 MMHG | BODY MASS INDEX: 30.88 KG/M2 | WEIGHT: 228 LBS | HEART RATE: 102 BPM | OXYGEN SATURATION: 98 %

## 2024-11-12 DIAGNOSIS — E11.29 MICROALBUMINURIA DUE TO TYPE 2 DIABETES MELLITUS (HCC): ICD-10-CM

## 2024-11-12 DIAGNOSIS — E78.5 DYSLIPIDEMIA: ICD-10-CM

## 2024-11-12 DIAGNOSIS — E11.65 TYPE 2 DIABETES MELLITUS WITH HYPERGLYCEMIA, WITHOUT LONG-TERM CURRENT USE OF INSULIN (HCC): ICD-10-CM

## 2024-11-12 DIAGNOSIS — Z89.422 HX OF AMPUTATION OF LESSER TOE, LEFT (HCC): ICD-10-CM

## 2024-11-12 DIAGNOSIS — I15.2 HYPERTENSION ASSOCIATED WITH TYPE 2 DIABETES MELLITUS (HCC): ICD-10-CM

## 2024-11-12 DIAGNOSIS — E11.22 STAGE 3 CHRONIC KIDNEY DISEASE DUE TO DIABETES MELLITUS (HCC): ICD-10-CM

## 2024-11-12 DIAGNOSIS — Z89.421 HX OF AMPUTATION OF LESSER TOE, RIGHT (HCC): ICD-10-CM

## 2024-11-12 DIAGNOSIS — L97.425 DIABETIC ULCER OF LEFT HEEL ASSOCIATED WITH TYPE 2 DIABETES MELLITUS, WITH MUSCLE INVOLVEMENT WITHOUT EVIDENCE OF NECROSIS (HCC): ICD-10-CM

## 2024-11-12 DIAGNOSIS — I73.9 PAD (PERIPHERAL ARTERY DISEASE) (HCC): ICD-10-CM

## 2024-11-12 DIAGNOSIS — E11.59 HYPERTENSION ASSOCIATED WITH TYPE 2 DIABETES MELLITUS (HCC): ICD-10-CM

## 2024-11-12 DIAGNOSIS — N18.30 STAGE 3 CHRONIC KIDNEY DISEASE DUE TO DIABETES MELLITUS (HCC): ICD-10-CM

## 2024-11-12 DIAGNOSIS — Z00.00 MEDICARE ANNUAL WELLNESS VISIT, INITIAL: Primary | ICD-10-CM

## 2024-11-12 DIAGNOSIS — N18.31 STAGE 3A CHRONIC KIDNEY DISEASE (CKD) (HCC): ICD-10-CM

## 2024-11-12 DIAGNOSIS — E11.621 DIABETIC ULCER OF LEFT HEEL ASSOCIATED WITH TYPE 2 DIABETES MELLITUS, WITH MUSCLE INVOLVEMENT WITHOUT EVIDENCE OF NECROSIS (HCC): ICD-10-CM

## 2024-11-12 DIAGNOSIS — R80.9 MICROALBUMINURIA DUE TO TYPE 2 DIABETES MELLITUS (HCC): ICD-10-CM

## 2024-11-12 DIAGNOSIS — R74.8 ELEVATED ALKALINE PHOSPHATASE LEVEL: ICD-10-CM

## 2024-11-12 DIAGNOSIS — E11.42 DIABETIC POLYNEUROPATHY ASSOCIATED WITH TYPE 2 DIABETES MELLITUS (HCC): ICD-10-CM

## 2024-11-12 DIAGNOSIS — Z72.0 TOBACCO USER: ICD-10-CM

## 2024-11-12 LAB
ANION GAP SERPL CALC-SCNC: 2 MMOL/L (ref 0–18)
BUN BLD-MCNC: 22 MG/DL (ref 9–23)
BUN/CREAT SERPL: 17.3 (ref 10–20)
CALCIUM BLD-MCNC: 9.8 MG/DL (ref 8.7–10.4)
CHLORIDE SERPL-SCNC: 110 MMOL/L (ref 98–112)
CHOLEST SERPL-MCNC: 215 MG/DL (ref ?–200)
CO2 SERPL-SCNC: 28 MMOL/L (ref 21–32)
CREAT BLD-MCNC: 1.27 MG/DL
EGFRCR SERPLBLD CKD-EPI 2021: 65 ML/MIN/1.73M2 (ref 60–?)
EST. AVERAGE GLUCOSE BLD GHB EST-MCNC: 223 MG/DL (ref 68–126)
FASTING PATIENT LIPID ANSWER: YES
FASTING STATUS PATIENT QL REPORTED: YES
GLUCOSE BLD-MCNC: 249 MG/DL (ref 70–99)
HBA1C MFR BLD: 9.4 % (ref ?–5.7)
HDLC SERPL-MCNC: 33 MG/DL (ref 40–59)
LDLC SERPL CALC-MCNC: 148 MG/DL (ref ?–100)
NONHDLC SERPL-MCNC: 182 MG/DL (ref ?–130)
OSMOLALITY SERPL CALC.SUM OF ELEC: 302 MOSM/KG (ref 275–295)
POTASSIUM SERPL-SCNC: 5 MMOL/L (ref 3.5–5.1)
SODIUM SERPL-SCNC: 140 MMOL/L (ref 136–145)
TRIGL SERPL-MCNC: 185 MG/DL (ref 30–149)
VIT D+METAB SERPL-MCNC: 8.4 NG/ML (ref 30–100)
VLDLC SERPL CALC-MCNC: 35 MG/DL (ref 0–30)

## 2024-11-12 PROCEDURE — 82306 VITAMIN D 25 HYDROXY: CPT

## 2024-11-12 PROCEDURE — 36415 COLL VENOUS BLD VENIPUNCTURE: CPT

## 2024-11-12 PROCEDURE — 80048 BASIC METABOLIC PNL TOTAL CA: CPT

## 2024-11-12 PROCEDURE — 80061 LIPID PANEL: CPT

## 2024-11-12 PROCEDURE — 83036 HEMOGLOBIN GLYCOSYLATED A1C: CPT

## 2024-11-12 NOTE — PROGRESS NOTES
Subjective:   Yo Villagomez is a 59 year old male who presents for a IPPE (Initial Preventative Physical Exam) (Welcome to Medicare- < 12 months on Medicare) and scheduled follow up of multiple significant but stable problems.     Patient presents for follow up uncontrolled diabetes. Last A1c noted to be 15.5%. Is on metformin 1000 mg BID and glipizide 10 mg for DM. At last visit started tresiba 20 units nightly. Patient has been doing well since starting tresiba.     CKD with microalbuminuria  Given new start of tresiba at las visit had not yet started jardiance.     Elevated alk phos  GGT checked at last visit normal. Potential bone etiology. Due for vitamin D check today    Hx of toe amputations and diabetic ulcer  Patient following with wound care. Last saw June 2024. This provider also placed podiatry referral. Patient has not been able to see. Needs new referral. Wound also recommending vascular surgery referral. Patient has seen vascular surgery in the past    Diabetic neuropathy  Patient not taking gabapentin. Did have side effect of blurry vision.     Hypertension  Well controlled. Not on medications    Tobacco use  Patient does have nicotine patches. Never tried.     History/Other:   Fall Risk Assessment:  He has been screened for Falls and is low risk.      Cognitive Assessment:  He had a completely normal cognitive assessment - see flowsheet entries     Functional Ability/Status:  Yo Villagomez has some abnormal functions as listed below:  He has Vision problems based on screening of functional status. He has Walking problems based on screening of functional status.       Depression Screening (PHQ):  PHQ-2 SCORE: 2  , done 11/12/2024   Little interest or pleasure in doing things: 1    Feeling down, depressed, or hopeless: 1           Advanced Directives: He does NOT have a Living Will. [Do you have a living will?: No]  He does NOT have a Power of  for Health Care. [Do you have a healthcare  power of ?: No]  Discussed Advance Care Planning with patient (and family/surrogate if present). Standard forms made available to patient in After Visit Summary.    Patient Active Problem List   Diagnosis    Type 2 diabetes mellitus with hyperglycemia, without long-term current use of insulin (HCC)    Diabetic neuropathy (HCC)    Dyslipidemia    Hypertension associated with type 2 diabetes mellitus (HCC)    PAD (peripheral artery disease) (HCC)    Diabetic ulcer of left foot associated with type 2 diabetes mellitus, with muscle involvement without evidence of necrosis (HCC)    Hx of amputation of lesser toe, left (HCC)    Hx of amputation of lesser toe, right (HCC)    Stage 3 chronic kidney disease due to diabetes mellitus (HCC)    Microalbuminuria due to type 2 diabetes mellitus (HCC)    Stage 3a chronic kidney disease (CKD) (HCC)     Allergies:  He is allergic to ace inhibitors.    Current Medications:  Outpatient Medications Marked as Taking for the 11/12/24 encounter (Office Visit) with Alea Al MD   Medication Sig    empagliflozin (JARDIANCE) 10 MG Oral Tab Take 1 tablet (10 mg total) by mouth daily.    metFORMIN HCl 1000 MG Oral Tab Take 1 tablet (1,000 mg total) by mouth 2 (two) times daily.    Insulin Pen Needle (BD PEN NEEDLE HAL 2ND GEN) 32G X 4 MM Does not apply Misc 1 NEEDLE AT BEDTIME. USE WITH TRESIBA NIGHTLY E11.65    insulin degludec (TRESIBA FLEXTOUCH) 200 UNIT/ML Subcutaneous Solution Pen-injector Inject 20 Units into the skin nightly.    Blood Glucose Monitoring Suppl (ONETOUCH ULTRA 2) w/Device Does not apply Kit Check glucose three times daily E11.65    OneTouch UltraSoft 2 Lancets Does not apply Misc 1 Lancet in the morning, at noon, and at bedtime. Check glucose three times daily E11.65    Glucose Blood (ONETOUCH VERIO) In Vitro Strip Check glucose three times daily E11.65    Continuous Glucose Sensor (FREESTYLE BRIDGER 2 SENSOR) Does not apply Misc Inject 1 Device into the skin  every 14 (fourteen) days.    clopidogrel 75 MG Oral Tab Take 1 tablet (75 mg total) by mouth nightly.    aspirin 81 MG Oral Tab EC Take 1 tablet (81 mg total) by mouth daily.    glipiZIDE 10 MG Oral Tab Take 1 tablet (10 mg total) by mouth 2 (two) times daily before meals.    simvastatin 20 MG Oral Tab Take 1 tablet (20 mg total) by mouth daily.       Medical History:  He  has a past medical history of Diabetes ([facility]), Diabetic neuropathy ([facility]), Dyslipidemia, ED (erectile dysfunction), Erectile dysfunction (12/06/2011), Hypertension, Necrotizing soft tissue infection, Obesity, Other diabetic neurological complication associated with type 2 diabetes mellitus ([facility]), PAD (peripheral artery disease) ([facility]), Sepsis ([facility]) (11/28/2021), and Smoker.  Surgical History:  He  has no past surgical history on file.   Family History:  His family history includes Diabetes in his brother and mother; Hyperlipidemia in his mother.  Social History:  He  reports that he has been smoking cigarettes. He started smoking about 15 years ago. He has a 15.9 pack-year smoking history. He has never used smokeless tobacco. He reports current alcohol use of about 1.0 standard drink of alcohol per week. He reports that he does not use drugs.    Tobacco:  Social History     Tobacco Use   Smoking Status Every Day    Current packs/day: 1.00    Average packs/day: 1 pack/day for 15.9 years (15.9 ttl pk-yrs)    Types: Cigarettes    Start date: 2009   Smokeless Tobacco Never     E-Cigarettes/Vaping       Questions Responses    E-Cigarette Use Never User           Tobacco cessation counseling for <3 minutes.      CAGE Alcohol Screen:   He has been screened for alcohol abuse and his score is not 0:  Cut: Have you ever felt you should Cut down on your drinking?: Yes  Annoyed: Have people Annoyed you by criticizing your drinking?: No  Guilty: Have you ever felt bad or Guilty about your drinking?: No  Eye Opener: Have you ever had a drink first thing in the  morning to steady your nerves or to get rid of a hangover (Eye opener)?: No  Total Score: 1  Discussed with patient. Drinks about 1-2 times per week and only 1-2 times per occasion. No concerns.     Patient Care Team:  Alea Al MD as PCP - General (Family Medicine)    Review of Systems   Constitutional: Negative.    HENT: Negative.     Eyes: Negative.    Respiratory: Negative.     Cardiovascular: Negative.    Gastrointestinal: Negative.    Genitourinary: Negative.    Musculoskeletal: Negative.    Skin: Negative.    Neurological: Negative.    Psychiatric/Behavioral: Negative.           Objective:   Physical Exam  Vitals and nursing note reviewed.   Constitutional:       General: He is not in acute distress.     Appearance: Normal appearance.   HENT:      Head: Normocephalic and atraumatic.   Eyes:      General:         Right eye: No discharge.         Left eye: No discharge.      Comments: Extraocular eye movements grossly intact   Pulmonary:      Effort: Pulmonary effort is normal.   Musculoskeletal:      Comments: Normal gait   Skin:     Findings: No rash.   Neurological:      General: No focal deficit present.      Mental Status: He is alert. Mental status is at baseline.   Psychiatric:         Mood and Affect: Mood normal.         Behavior: Behavior normal.         /78   Pulse 102   Ht 6' (1.829 m)   Wt 228 lb (103.4 kg)   SpO2 98%   BMI 30.92 kg/m²  Estimated body mass index is 30.92 kg/m² as calculated from the following:    Height as of this encounter: 6' (1.829 m).    Weight as of this encounter: 228 lb (103.4 kg).    Medicare Hearing Assessment:      Visual Acuity:   Right Eye Visual Acuity: Uncorrected Right Eye Chart Acuity: 20/40     Left Eye Chart Acuity: 20/40     Both Eyes Chart Acuity: 20/40   Able To Tolerate Visual Acuity: Yes        Assessment & Plan:   Yo Villagomez is a 59 year old male who presents for a Medicare Assessment.     1. Medicare annual wellness visit, initial  (Primary)  -patient declined TDAP, flu and shingrix vaccines    2. Type 2 diabetes mellitus with hyperglycemia, without long-term current use of insulin ([facility])  -     Basic Metabolic Panel (8); Future; Expected date: 11/12/2024  -     Hemoglobin A1C; Future; Expected date: 11/12/2024  -     Lipid Panel; Future; Expected date: 11/12/2024  -     metFORMIN HCl; Take 1 tablet (1,000 mg total) by mouth 2 (two) times daily.  Dispense: 90 tablet; Refill: 3  -     Ophthalmology Referral - In Network    -not well controlled. Ordered updated A1c since starting tresiba  -no changes to regimen for now. Pending A1c may adjust tresiba dose.     3. Stage 3 chronic kidney disease due to diabetes mellitus ([facility])  -     Basic Metabolic Panel (8); Future; Expected date: 11/12/2024  -     Vitamin D; Future; Expected date: 11/12/2024    -stable. Ordered updated renal function with labs as listed. Will work on controlling diabetes as above. Also started jardiance today    4. Microalbuminuria due to type 2 diabetes mellitus ([facility])  -     Empagliflozin; Take 1 tablet (10 mg total) by mouth daily.  Dispense: 90 tablet; Refill: 3    -stable. Ordered updated renal function with labs as above. Will work on controlling diabetes as above. Also started jardiance today    5. Stage 3a chronic kidney disease (CKD) ([facility])  -stable. Ordered updated renal function with labs as above. Will work on controlling diabetes as above. Also started jardiance today    6. Elevated alkaline phosphatase level  Previous GGT normal indicating potential bone etiology. Uncontrolled DM can also increase level. Ordered vitamin D level to assess for deficiency    7. Hx of amputation of lesser toe, left ([facility])  -     Vitamin D; Future; Expected date: 11/12/2024  -     Podiatry Referral    -stable. Counseled patient to reestablish with wound care. Provided new referral to podiatrist at [place]    8. Hx of amputation of lesser toe, right ([facility])  -     Podiatry  Referral      -stable. Counseled patient to reestablish with wound care. Provided new referral to podiatrist at [place]    9. Diabetic ulcer of left heel associated with type 2 diabetes mellitus, with muscle involvement without evidence of necrosis ([facility])  -     Podiatry Referral      -stable. Counseled patient to reestablish with wound care. Provided new referral to podiatrist at [place]    10. PAD (peripheral artery disease) (HCC)  -     Vascular Surgery - In Network    -Stable. To continue ASA 81 mg daily. Placed referral to vascular surgery to establish care    11. Diabetic polyneuropathy associated with type 2 diabetes mellitus ([facility])    -stable. Gabapentin caused blurry vision. Discontinued today. Pain level 3 off of gabapentin. Will work on diabetic control as above    12. Hypertension associated with type 2 diabetes mellitus ([facility])  Well controlled off of medications    13. Dyslipidemia  -     Lipid Panel; Future; Expected date: 11/12/2024    -stable. To continue on statin. Ordered updated lipid panel    14. Tobacco user  -patient remains precontemplative on smoking cessation. Will continue to readdress at future visits    The patient indicates understanding of these issues and agrees to the plan.  Reinforced healthy diet, lifestyle, and exercise.      Return in about 3 months (around 2/12/2025) for Diabetes.     Alea Al MD, 11/12/2024     Supplementary Documentation:   General Health:  In the past six months, have you lost more than 10 pounds without trying?: 2 - No  Has your appetite been poor?: No  Type of Diet: Low Salt;Low Carb  How does the patient maintain a good energy level?: Daily Walks  How would you describe your daily physical activity?: Light  How would you describe your current health state?: Fair  How do you maintain positive mental well-being?: Visiting Family;Games  On a scale of 0 to 10, with 0 being no pain and 10 being severe pain, what is your pain level?: 3 - (Mild)  In the past  six months, have you experienced urine leakage?: 0-No  At any time do you feel concerned for the safety/well-being of yourself and/or your children, in your home or elsewhere?: No  Have you had any immunizations at another office such as Influenza, Hepatitis B, Tetanus, or Pneumococcal?: No    Health Maintenance   Topic Date Due    Diabetes Care Dilated Eye Exam  Never done    DTaP,Tdap,and Td Vaccines (1 - Tdap) Never done    Zoster Vaccines (1 of 2) Never done    COVID-19 Vaccine (3 - 2023-24 season) 09/01/2024    Influenza Vaccine (1) 10/01/2024    Diabetes Care A1C  11/13/2024    Diabetes Care Foot Exam  05/13/2025    Diabetes Care: GFR  05/13/2025    Annual Physical  05/13/2025    Diabetes Care: Microalb/Creat Ratio  06/06/2025    Colorectal Cancer Screening  11/11/2025    PSA  05/13/2026    Annual Depression Screening  Completed    Tobacco Cessation Counseling  Completed    Pneumococcal Vaccine: Birth to 64yrs  Completed

## 2024-11-12 NOTE — PATIENT INSTRUCTIONS
I have placed referrals for you to see a podiatrist (foot doctor) at [place] and a Vascular surgeon also at [place] to make sure your arteries are healthy to let your wounds heal up    Make to restart seeing the wound care people    Today I started Jardiance for your kidneys since they are a little bit irritated.

## 2024-11-30 ENCOUNTER — TELEPHONE (OUTPATIENT)
Facility: CLINIC | Age: 59
End: 2024-11-30

## 2024-11-30 DIAGNOSIS — R80.9 MICROALBUMINURIA DUE TO TYPE 2 DIABETES MELLITUS (HCC): ICD-10-CM

## 2024-11-30 DIAGNOSIS — E78.5 DYSLIPIDEMIA: ICD-10-CM

## 2024-11-30 DIAGNOSIS — E11.65 TYPE 2 DIABETES MELLITUS WITH HYPERGLYCEMIA, WITHOUT LONG-TERM CURRENT USE OF INSULIN (HCC): ICD-10-CM

## 2024-11-30 DIAGNOSIS — I73.9 PAD (PERIPHERAL ARTERY DISEASE) (HCC): Primary | ICD-10-CM

## 2024-11-30 DIAGNOSIS — E11.29 MICROALBUMINURIA DUE TO TYPE 2 DIABETES MELLITUS (HCC): ICD-10-CM

## 2024-11-30 RX ORDER — ASPIRIN 81 MG/1
81 TABLET ORAL DAILY
Qty: 90 TABLET | Refills: 3 | Status: SHIPPED | OUTPATIENT
Start: 2024-11-30

## 2024-11-30 RX ORDER — SIMVASTATIN 20 MG
20 TABLET ORAL DAILY
Qty: 90 TABLET | Refills: 3 | Status: SHIPPED | OUTPATIENT
Start: 2024-11-30

## 2024-11-30 RX ORDER — LANCETS 30 GAUGE
1 EACH MISCELLANEOUS 3 TIMES DAILY
Qty: 100 EACH | Refills: 3 | Status: SHIPPED | OUTPATIENT
Start: 2024-11-30

## 2024-11-30 RX ORDER — GLIPIZIDE 10 MG/1
10 TABLET ORAL
Qty: 180 TABLET | Refills: 3 | Status: SHIPPED | OUTPATIENT
Start: 2024-11-30

## 2024-11-30 RX ORDER — INSULIN DEGLUDEC 200 U/ML
20 INJECTION, SOLUTION SUBCUTANEOUS NIGHTLY
Qty: 9 ML | Refills: 3 | Status: SHIPPED | OUTPATIENT
Start: 2024-11-30

## 2024-11-30 RX ORDER — PEN NEEDLE, DIABETIC 32GX 5/32"
NEEDLE, DISPOSABLE MISCELLANEOUS
Qty: 100 EACH | Refills: 3 | Status: SHIPPED | OUTPATIENT
Start: 2024-11-30

## 2024-11-30 RX ORDER — CLOPIDOGREL BISULFATE 75 MG/1
75 TABLET ORAL NIGHTLY
Qty: 90 TABLET | Refills: 3 | Status: SHIPPED | OUTPATIENT
Start: 2024-11-30

## 2024-11-30 RX ORDER — BLOOD SUGAR DIAGNOSTIC
STRIP MISCELLANEOUS
Qty: 100 STRIP | Refills: 1 | Status: SHIPPED | OUTPATIENT
Start: 2024-11-30 | End: 2025-11-30

## 2024-12-02 ENCOUNTER — TELEPHONE (OUTPATIENT)
Facility: CLINIC | Age: 59
End: 2024-12-02

## 2024-12-02 NOTE — TELEPHONE ENCOUNTER
Left patient a voicemail to reschedule 12/12/2024 appointment to reschedule appointment due to provider not being here.

## 2024-12-12 ENCOUNTER — OFFICE VISIT (OUTPATIENT)
Dept: WOUND CARE | Facility: HOSPITAL | Age: 59
End: 2024-12-12
Attending: NURSE PRACTITIONER
Payer: MEDICARE

## 2024-12-12 VITALS
HEIGHT: 72 IN | HEART RATE: 99 BPM | WEIGHT: 226.63 LBS | DIASTOLIC BLOOD PRESSURE: 85 MMHG | SYSTOLIC BLOOD PRESSURE: 142 MMHG | TEMPERATURE: 97 F | BODY MASS INDEX: 30.7 KG/M2 | OXYGEN SATURATION: 97 %

## 2024-12-12 DIAGNOSIS — E11.621 DIABETIC ULCER OF LEFT MIDFOOT ASSOCIATED WITH TYPE 2 DIABETES MELLITUS, LIMITED TO BREAKDOWN OF SKIN (HCC): Primary | ICD-10-CM

## 2024-12-12 DIAGNOSIS — L97.416 DIABETIC ULCER OF RIGHT MIDFOOT ASSOCIATED WITH TYPE 2 DIABETES MELLITUS, WITH BONE INVOLVEMENT WITHOUT EVIDENCE OF NECROSIS (HCC): ICD-10-CM

## 2024-12-12 DIAGNOSIS — L97.421 DIABETIC ULCER OF LEFT MIDFOOT ASSOCIATED WITH TYPE 2 DIABETES MELLITUS, LIMITED TO BREAKDOWN OF SKIN (HCC): Primary | ICD-10-CM

## 2024-12-12 DIAGNOSIS — F17.200 TOBACCO DEPENDENCE WITH CURRENT USE: ICD-10-CM

## 2024-12-12 DIAGNOSIS — E11.65 UNCONTROLLED TYPE 2 DIABETES MELLITUS WITH HYPERGLYCEMIA, WITH LONG-TERM CURRENT USE OF INSULIN (HCC): ICD-10-CM

## 2024-12-12 DIAGNOSIS — E11.621 DIABETIC ULCER OF RIGHT MIDFOOT ASSOCIATED WITH TYPE 2 DIABETES MELLITUS, WITH BONE INVOLVEMENT WITHOUT EVIDENCE OF NECROSIS (HCC): ICD-10-CM

## 2024-12-12 DIAGNOSIS — I73.9 PERIPHERAL ARTERIAL DISEASE WITH HISTORY OF REVASCULARIZATION (HCC): ICD-10-CM

## 2024-12-12 DIAGNOSIS — Z98.890 PERIPHERAL ARTERIAL DISEASE WITH HISTORY OF REVASCULARIZATION (HCC): ICD-10-CM

## 2024-12-12 DIAGNOSIS — Z79.4 UNCONTROLLED TYPE 2 DIABETES MELLITUS WITH HYPERGLYCEMIA, WITH LONG-TERM CURRENT USE OF INSULIN (HCC): ICD-10-CM

## 2024-12-12 PROCEDURE — 87205 SMEAR GRAM STAIN: CPT | Performed by: NURSE PRACTITIONER

## 2024-12-12 PROCEDURE — 97597 DBRDMT OPN WND 1ST 20 CM/<: CPT | Performed by: NURSE PRACTITIONER

## 2024-12-12 PROCEDURE — 87077 CULTURE AEROBIC IDENTIFY: CPT | Performed by: NURSE PRACTITIONER

## 2024-12-12 PROCEDURE — 87076 CULTURE ANAEROBE IDENT EACH: CPT | Performed by: NURSE PRACTITIONER

## 2024-12-12 PROCEDURE — 87075 CULTR BACTERIA EXCEPT BLOOD: CPT | Performed by: NURSE PRACTITIONER

## 2024-12-12 PROCEDURE — 99214 OFFICE O/P EST MOD 30 MIN: CPT

## 2024-12-12 PROCEDURE — 87070 CULTURE OTHR SPECIMN AEROBIC: CPT | Performed by: NURSE PRACTITIONER

## 2024-12-12 PROCEDURE — 87186 SC STD MICRODIL/AGAR DIL: CPT | Performed by: NURSE PRACTITIONER

## 2024-12-12 NOTE — PROGRESS NOTES
Patient ID: Yo Villagomez is a 59 year old male.    Debridement Diabetic Ulcer Plantar;Left Foot   Wound 12/12/24 1 Foot Plantar;Left    Performed by: Maite Goddard APRN  Authorized by: Maite Goddard APRN      Consent   Consent obtained? verbal  Consent given by: patient  Risks discussed? procedural risks discussed  Time out called at 12/12/2024 11:04 AM  Immediately prior to the procedure a time out was called and the performing provider verified the correct patient, procedure, equipment, support staff, and site/side marked as required.    Debridement Details  Performed by: NP  Debridement type: conservative sharp  Pain control: none    Pre-debridement measurements  Length (cm): 2.3  Width (cm): 2.3  Depth (cm): 1  Surface Area (cm^2): 5.29    Post-debridement measurements  Length (cm): 2.5  Width (cm): 3  Depth (cm): 1  Percent debrided: 50%  Surface Area (cm^2): 7.5  Area Debrided (cm^2): 3.75  Volume (cm^3): 7.5    Devitalized tissue debrided: biofilm and callus  Instrument(s) utilized: blade and curette  Comment regarding bleeding: scant  Hemostasis obtained with: pressure  Procedural pain (0-10): 1  Post-procedural pain: 1   Response to treatment: procedure was tolerated well    Debridement Diabetic Ulcer Left;Lateral;Plantar Foot   Wound 12/12/24 2 Foot Left;Lateral;Plantar    Performed by: Maite Goddard APRN  Authorized by: Maite Goddard APRN      Consent   Consent obtained? verbal  Consent given by: patient  Risks discussed? procedural risks discussed  Time out called at 12/12/2024 11:04 AM  Immediately prior to the procedure a time out was called and the performing provider verified the correct patient, procedure, equipment, support staff, and site/side marked as required.    Debridement Details  Performed by: NP  Debridement type: conservative sharp  Pain control: none    Pre-debridement measurements  Length (cm): 2.4  Width (cm): 1.7  Depth (cm): 0.7  Surface Area (cm^2):  4.08    Post-debridement measurements  Length (cm): 2.5  Width (cm): 1.9  Depth (cm): 0.5  Percent debrided: 50%  Surface Area (cm^2): 4.75  Area Debrided (cm^2): 2.38  Volume (cm^3): 2.38    Devitalized tissue debrided: biofilm and callus  Instrument(s) utilized: blade and curette  Comment regarding bleeding: scant  Hemostasis obtained with: pressure  Procedural pain (0-10): 1  Post-procedural pain: 1   Response to treatment: procedure was tolerated well    Debridement Diabetic Ulcer Right;Lateral Foot   Wound 12/12/24 3 Foot Right;Lateral    Performed by: Maite Goddard APRN  Authorized by: Maite Goddard APRN      Consent   Consent obtained? verbal  Consent given by: patient  Risks discussed? procedural risks discussed  Time out called at 12/12/2024 11:04 AM  Immediately prior to the procedure a time out was called and the performing provider verified the correct patient, procedure, equipment, support staff, and site/side marked as required.    Debridement Details  Performed by: NP  Debridement type: conservative sharp  Pain control: none    Pre-debridement measurements  Length (cm): 8.6  Width (cm): 5  Depth (cm): 0.1  Surface Area (cm^2): 43    Post-debridement measurements  Length (cm): 8.6  Width (cm): 5  Depth (cm): 0.2  Percent debrided: 20%  Surface Area (cm^2): 43  Area Debrided (cm^2): 8.6  Volume (cm^3): 8.6    Devitalized tissue debrided: callus and fibrin  Instrument(s) utilized: blade and curette  Bleeding: none  Hemostasis obtained with: not applicable  Procedural pain (0-10): 1  Post-procedural pain: 1   Response to treatment: procedure was tolerated well

## 2024-12-12 NOTE — PROGRESS NOTES
Chief Complaint   Patient presents with    Wound Care     Here for left foot wound care, states his foot wounds has never really closed from first time     HPI:   12/12/24: Pt returns to wound care, was lost to follow up over past 6 months. Says he has been cleaning the wounds and keeping them covered. Wants to get back on track with health. He was recently started on insulin by his PCP. He doesn't check his BG regularly. Does his best to minimize weight bearing. Has appt to see vascular surgery next week. PCP has also referred to podiatry, no appt yet. Says he still smokes cigarettes, trying to cut back. Drinks wine once a week and denies recreational drug use at this time.     6/11/24: F/u left DFU. Saw his PCP last week, says he started on insulin. Received podiatry referral as well, has not scheduled appointment. Did not receive vascular surgery referral. Reports he has been doing well with dressing changes BID for the past week. Reports the swelling in his leg occurs if he is up and about for 3 hours, resolves when he sits and elevates legs. He denies fever, chill, pain. Says Denies new concerns.    6/5/24: F/u left DFU. Had difficulty filling Rx for Dakin's. Says he picked up at a specialty pharmacy yesterday, 1/2 strength instead of 1/4 strength. Reports he has been doing well, denies change in health since last visit. Has a follow up visit with his PCP tomorrow, will request updated referrals for [facility] and vascular surgery.     5/23/24: F/u visit for left foot wounds. Didn't  Dakin's solution after last visit and hasn't received gauze and tape from DME, therefore, says he has continued to apply betadine and gauze to wounds. Denies change in health since last visit, no new concerns.    5/16/24: 59 yr old male here today for chronic wound on left foot. His current medical problems include uncontrolled T2DM (A1c 15.5% on 5/13/24), diabetic neuropathy, tobacco use (1/2 PPD), HLD, PAD.  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 He underwent RLE angiogram with intervention 2/20/23. He reports he had been seeing a podiatrist regularly until his insurance changed this year. He noticed wounds developed on his left foot about 4 months ago. He  has been applying betadine and covering with gauze almost every day. He denies drainage from the right foot and has noticed the skin changes over the past year as well. He has chronic intermittent pain in both feet for which he reports he previously received Odell from his podiatrist. He used to wear a boot to offload wounds, no longer has it. He tries to walk on his heel as much as possible. PCP ordered a cane, but is having a hard time getting from DME company. He lives alone, says he stays with his mother often. He has cut back on tobacco use from 1 PPD to 1/2 PPD. He denies recent alcohol use and denies recent illicit substance use.     Lab Results   Component Value Date    BUN 22 11/12/2024    CREATSERUM 1.27 11/12/2024    ALB 4.0 05/13/2024    TP 7.8 05/13/2024    A1C 9.4 (H) 11/12/2024     Pertinent Imaging/Results (copy/pasted from Care Everywhere--[facility]):    LACIE 1/23/2023  Garry Fournier MD - 01/23/2023   Formatting of this note might be different from the original.   EXAM:   ABIs COMPLETE     HISTORY:   PAD     COMPARISON:   None     TECHNIQUE:   Segmental arterial pressures and Doppler waveforms at three arterial levels in the leg, and toe plethysmography.     FINDINGS:   RIGHT: LACIE 0.61, toe brachial index 0.23, toe pressure 32 mmHg. Brachial pressure 137 mmHg.     Right leg waveforms as follows:   Common femoral: Triphasic   Superficial femoral:  Triphasic   Popliteal:  Triphasic   Posterior tibial:  Monophasic   Dorsalis pedis:  Monophasic     LEFT: LACIE 0.91, toe brachial index was unable to be performed due to amputation. Brachial pressure 130 mmHg.     Left leg waveforms as follows:   Common femoral:  Triphasic   Superficial femoral:  Triphasic   Popliteal:  Triphasic   Posterior tibial:  Triphasic   Dorsalis pedis:  Triphasic     IMPRESSION:   1. On the right, there is there is moderate evidence of arterial insufficiency by waveform and LACIE criteria. There is severe pedal arterial insufficiency by TBI criteria (TBI 0.23) and waveform amplitude, suggestive of poor wound healing potential.     2.  On the left, there is mild arterial insufficiency by waveform and TBI criteria. Unable to perform TBI due to amputation..     IR Arteriogram Lower Extremity Unilateral/Bilateral 2/20/23  Impression:  1. Right lower extremity angiogram demonstrates focal moderate popliteal stenosis, moderate stenosis of the proximal to mid peroneal artery and occluded PT occluded reconstituted at the ankle. The AT is occluded with only DP reconstitution.  2. Successful revascularization of the distal popliteal, TPT trunk, Peroneal artery and posterior tibial artery with POBA.  3. Balloon angioplasty of the peroneal artery, tibioperoneal trunk, and posterior tibial artery. Proximal aspects of each tibial vessel were scoring balloon angioplasty.  4. 4mm Candler DCB treatment of the distal pop extending to the proximal PT.      Current Outpatient Medications:     aspirin 81 MG Oral Tab EC, Take 1 tablet (81 mg total) by mouth daily., Disp: 90 tablet, Rfl: 3    glipiZIDE 10 MG Oral Tab, Take 1 tablet (10 mg total) by mouth 2 (two) times daily before meals., Disp: 180 tablet, Rfl: 3    insulin degludec (TRESIBA FLEXTOUCH) 200 UNIT/ML Subcutaneous Solution Pen-injector, Inject 20 Units into the skin nightly., Disp: 9 mL, Rfl: 3    Insulin Pen Needle (BD PEN NEEDLE HAL 2ND GEN) 32G X 4 MM Does not apply Misc, 1 NEEDLE AT BEDTIME. USE WITH TRESIBA NIGHTLY E11.65, Disp: 100 each, Rfl: 3    metFORMIN HCl 1000 MG Oral Tab, Take 1 tablet (1,000 mg total) by mouth 2 (two) times daily., Disp: 90 tablet, Rfl: 3    OneTouch UltraSoft 2 Lancets Does not  apply Misc, 1 Lancet in the morning, at noon, and at bedtime. Check glucose three times daily E11.65, Disp: 100 each, Rfl: 3    simvastatin 20 MG Oral Tab, Take 1 tablet (20 mg total) by mouth daily., Disp: 90 tablet, Rfl: 3    clopidogrel 75 MG Oral Tab, Take 1 tablet (75 mg total) by mouth nightly., Disp: 90 tablet, Rfl: 3    empagliflozin (JARDIANCE) 10 MG Oral Tab, Take 1 tablet (10 mg total) by mouth daily. (Patient not taking: Reported on 12/12/2024), Disp: 90 tablet, Rfl: 3    Glucose Blood (ONETOUCH VERIO) In Vitro Strip, Check glucose three times daily E11.65 (Patient not taking: Reported on 12/12/2024), Disp: 100 strip, Rfl: 1    Blood Glucose Monitoring Suppl (ONETOUCH ULTRA 2) w/Device Does not apply Kit, Check glucose three times daily E11.65 (Patient not taking: Reported on 12/12/2024), Disp: 1 kit, Rfl: 0    Continuous Glucose Sensor (FREESTYLE BRIDGER 2 SENSOR) Does not apply Misc, Inject 1 Device into the skin every 14 (fourteen) days., Disp: , Rfl:     Allergies   Allergen Reactions    Ace Inhibitors UNKNOWN      REVIEW OF SYSTEMS:   Review of Systems   Constitutional:  Negative for activity change, chills and fever.   Respiratory:  Negative for shortness of breath.    Cardiovascular:  Negative for chest pain and leg swelling.   Gastrointestinal:  Negative for abdominal pain, diarrhea and vomiting.   Musculoskeletal:  Positive for arthralgias and gait problem.   Skin:  Positive for wound. Negative for rash.   Neurological:  Negative for dizziness, numbness and headaches.      PHYSICAL EXAM:   /85   Pulse 99   Temp 96.8 °F (36 °C)   Ht 72\"   Wt 226 lb 9.6 oz   SpO2 97%   BMI 30.73 kg/m²    Estimated body mass index is 30.73 kg/m² as calculated from the following:    Height as of this encounter: 72\".    Weight as of this encounter: 226 lb 9.6 oz.   Vital signs reviewed.Appears stated age, well groomed.    Physical Exam  Constitutional:       General: He is not in acute distress.      Appearance: Normal appearance. He is obese. He is not ill-appearing.   HENT:      Head: Normocephalic and atraumatic.   Cardiovascular:      Pulses:           Dorsalis pedis pulses are 2+ on the right side and 1+ on the left side.        Posterior tibial pulses are 2+ on the right side and 1+ on the left side.   Pulmonary:      Effort: Pulmonary effort is normal.   Musculoskeletal:      Right foot: Deformity present.      Left foot: Deformity present.      Right Lower Extremity: Right leg is amputated below ankle. (Right 5th toe)     Left Lower Extremity: Left leg is amputated below ankle. (TMA)  Feet:      Right foot:      Skin integrity: Ulcer, callus and dry skin present. No erythema or warmth.      Toenail Condition: Right toenails are abnormally thick and long.      Left foot:      Skin integrity: Ulcer, callus and dry skin present. No erythema or warmth.      Toenail Condition: Left toenails are abnormally thick and long.      Comments: Right PT and DP with biphasic wave sounds, Left PT with monophasic wave sounds using handheld doppler.   Skin:     General: Skin is warm and dry.      Capillary Refill: Capillary refill takes 2 to 3 seconds.      Findings: Wound (left foot) present.   Neurological:      General: No focal deficit present.      Mental Status: He is alert and oriented to person, place, and time.   Psychiatric:         Mood and Affect: Mood normal.         Behavior: Behavior normal.        Wound 12/12/24 1 Foot Plantar;Left (Active)   Date First Assessed/Time First Assessed: 12/12/24 1101    Wound Number (Wound Clinic Only): 1  Primary Wound Type: Diabetic Ulcer  Location: Foot  Wound Location Orientation: Plantar;Left      Assessments 12/12/2024 11:04 AM 12/12/2024 11:05 AM   Wound Image       Site Assessment Moist;Red;Granulation tissue Moist;Red;Granulation tissue   Closure Not approximated Not approximated   Drainage Amount Moderate Moderate   Drainage Description Serosanguineous  Serosanguineous   Treatments Cleansed;Saline;Wound ;Dakins;Shoe Cleansed;Saline;Wound ;Dakins;Shoe   Shoe Type Felt Felt   Dressing Gauze;Tape Gauze;Tape   Dressing Changed New New   Dressing Status Clean;Dry;Intact Clean;Dry;Intact   Wound Length (cm) 2.3 cm 2.5 cm   Wound Width (cm) 2.3 cm 3 cm   Wound Surface Area (cm^2) 5.29 cm^2 7.5 cm^2   Wound Depth (cm) 1 cm 1 cm   Wound Volume (cm^3) 5.29 cm^3 7.5 cm^3   Wound Healing % -- -42   Margins Well-defined edges;Not attached Well-defined edges;Not attached   Non-staged Wound Description Full thickness Full thickness   Marcy-wound Assessment Dry;Callous Dry;Callous   Wound Granulation Tissue Red Red   Wound Bed Granulation (%) 100 % 100 %   Wound Bed Epithelium (%) 0 % 0 %   Wound Bed Slough (%) 0 % 0 %   Wound Bed Eschar (%) 0 % 0 %   Wound Bed Fibrin (%) 0 % 0 %   State of Healing Undermining;Hyperkeratosis;Non-healing Undermining;Hyperkeratosis;Non-healing   Wound Odor Strong Strong   Undermining? Yes Yes   Number of Undermines 1 --   Undermine 1 Start Position 9 --   Undermine 1 End Position 6 --   Nunn Scale -- Grade 2       Active Orders   Date Order Priority Status Authorizing Provider   12/12/24 1205 Debridement Diabetic Ulcer Plantar;Left Foot Routine Active Maite Goddard APRN       Wound 12/12/24 2 Foot Left;Lateral;Plantar (Active)   Date First Assessed/Time First Assessed: 12/12/24 1102    Wound Number (Wound Clinic Only): 2  Primary Wound Type: Diabetic Ulcer  Location: Foot  Wound Location Orientation: Left;Lateral;Plantar      Assessments 12/12/2024 11:04 AM 12/12/2024 11:05 AM   Wound Image       Site Assessment Moist;Red;Granulation tissue Moist;Red;Granulation tissue   Closure Not approximated Not approximated   Drainage Amount Moderate Moderate   Drainage Description Serosanguineous Serosanguineous   Treatments Cleansed;Saline;Wound ;Dakins;Shoe Cleansed;Saline;Wound ;Dakins;Shoe   Shoe Type Felt Felt   Dressing  Gauze;Tape Gauze;Tape   Dressing Changed New New   Dressing Status Clean;Dry;Intact Clean;Dry;Intact   Wound Length (cm) 2.4 cm 2.5 cm   Wound Width (cm) 1.7 cm 1.9 cm   Wound Surface Area (cm^2) 4.08 cm^2 4.75 cm^2   Wound Depth (cm) 0.7 cm 0.5 cm   Wound Volume (cm^3) 2.856 cm^3 2.375 cm^3   Wound Healing % -- 17   Margins Well-defined edges;Not attached Well-defined edges;Not attached   Non-staged Wound Description Full thickness Full thickness   Marcy-wound Assessment Dry;Callous Dry;Callous   Wound Granulation Tissue Red Red   Wound Bed Granulation (%) 100 % 100 %   Wound Bed Epithelium (%) 0 % 0 %   Wound Bed Slough (%) 0 % 0 %   Wound Bed Eschar (%) 0 % 0 %   Wound Bed Fibrin (%) 0 % 0 %   State of Healing Undermining;Hyperkeratosis;Non-healing Undermining;Hyperkeratosis;Non-healing   Wound Odor Strong Strong   Undermining? Yes Yes   Number of Undermines 1 --   Undermine 1 Start Position 7 --   Undermine 1 End Position 12 --       Active Orders   Date Order Priority Status Authorizing Provider   12/12/24 1207 Debridement Diabetic Ulcer Left;Lateral;Plantar Foot Routine Active Maite Goddard APRN       Wound 12/12/24 3 Foot Right;Lateral (Active)   Date First Assessed/Time First Assessed: 12/12/24 1122    Wound Number (Wound Clinic Only): 3  Primary Wound Type: Diabetic Ulcer  Location: Foot  Wound Location Orientation: Right;Lateral      Assessments 12/12/2024 11:04 AM 12/12/2024 11:05 AM   Wound Image         Site Assessment Dry;Brown Dry;Brown;Moist;Red   Drainage Amount None Scant   Drainage Description Scabbed --   Treatments Cleansed;Wound ;Topical (Barrier/Moisturizer/Ointment);Dakins Cleansed;Wound ;Topical (Barrier/Moisturizer/Ointment)   Dressing Gauze;Tape Gauze;Tape   Dressing Changed New New   Dressing Status Clean;Dry;Intact Clean;Dry;Intact   Wound Length (cm) 8.6 cm 8.6 cm   Wound Width (cm) 5 cm 5 cm   Wound Surface Area (cm^2) 43 cm^2 43 cm^2   Wound Depth (cm) 0 cm 0.2 cm    Wound Volume (cm^3) 0 cm^3 8.6 cm^3   Margins Poorly defined Poorly defined   Non-staged Wound Description Not applicable Full thickness   Marcy-wound Assessment Dry;Callous Dry;Brown;Callous   Wound Bed Granulation (%) 0 % 5 %   Wound Bed Epithelium (%) 0 % 0 %   Wound Bed Slough (%) 0 % 0 %   Wound Bed Eschar (%) 0 % 0 %   Wound Bed Fibrin (%) 100 % 100 %   State of Healing Non-healing;Hyperkeratosis Non-healing;Hyperkeratosis   Wound Odor Strong Strong   Shape Cluster of 2 Cluster of 2       Active Orders   Date Order Priority Status Authorizing Provider   12/12/24 1208 Debridement Diabetic Ulcer Right;Lateral Foot Routine Active Maite Goddard APRN     Procedures  ASSESSMENT AND PLAN:      1. Diabetic ulcer of left midfoot associated with type 2 diabetes mellitus, limited to breakdown of skin ([facility])  - Aerobic Bacterial Culture  - Anaerobic Culture  - Aerobic Bacterial Culture  - Anaerobic Culture    2. Diabetic ulcer of right midfoot associated with type 2 diabetes mellitus, with bone involvement without evidence of necrosis ([facility])  - Aerobic Bacterial Culture  - Anaerobic Culture  - Aerobic Bacterial Culture  - Anaerobic Culture    3. Peripheral arterial disease with history of revascularization ([facility])    4. Tobacco dependence with current use  - Aerobic Bacterial Culture  - Anaerobic Culture  - Aerobic Bacterial Culture  - Anaerobic Culture    5. Uncontrolled type 2 diabetes mellitus with hyperglycemia, with long-term current use of insulin ([facility])  - Aerobic Bacterial Culture  - Anaerobic Culture  - Aerobic Bacterial Culture  - Anaerobic Culture    Returns to wound care for evaluation of b/l diabetic foot ulcers, last visit was 6 months ago. He is s/p L TMA 11/28/21 and right 5th ray amputation 10/2022. Wounds have been present for >1 year. He has PAD with hx of revascularizations, most recently on RLE 2/20/23 at [facility]. He has palpaple PT/DP of the right foot with biphasic wave sounds. The left PT and DP with  faint monophasic wave sounds. Hindering factors for wound healing include uncontrolled T2DM, tobacco use, PAD and peripheral neuropathy.  Wound beds are granular, malodorous. Thick periwound callus and hyperkeratosis. There is no erythema, swelling, increased pain or fever. Callus and biofilm debrided from all wounds (see procedure note). Wound culture taken from both wounds on left foot. Consider updated xrays next visit.   Wound care: Garyin's 1/4 strength wet to moist gauze dressing BID, felt offloading pads applied to both feet  He was advised to continue to monitor for signs and symptoms of infection, encouraged him to assess skin daily, seek immediate care if he develops any s/sx of infection.  Encouraged smoking cessation and diabetes control, discussed impact on wound healing.   Encouraged him to follow up with PCP re referral to podiatry  Reviewed nutrition for wound healing -- reduced carbohydrate intake, increased protein intake, and healthy diet. Recommended increased vitamin intake (either with foods or vitamin supplements), need vitamin A, Bs, C, D and zinc for wound healing.   Risks, benefits, and alternatives of current treatment plan discussed in detail.  Questions and concerns addressed. Red flags to RTC or ED reviewed.  Patient agrees to plan.      Return in about 1 week (around 12/19/2024) for APN x 30 minutes.    I spent 60 minutes with the patient. This time included:  preparing to see the patient (eg, review notes and recent diagnostics), seeing the patient, performing a medically appropriate examination and/or evaluation, counseling and educating the patient, and documenting in the record.    NOTE TO PATIENT: The 21st Century Cures Act makes clinical notes like these available to patients in the interest of transparency. Clinical notes are medical documents used by physicians and care providers to communicate with each other. These documents include medical language and terminology,  abbreviations, and treatment information that may sound technical and at times possibly unfamiliar. In addition, at times, the verbiage may appear blunt or direct. These documents are one tool providers use to communicate relevant information and clinical opinions of the care providers in a way that allows common understanding of the clinical context.

## 2024-12-17 ENCOUNTER — APPOINTMENT (OUTPATIENT)
Dept: WOUND CARE | Facility: HOSPITAL | Age: 59
End: 2024-12-17
Attending: NURSE PRACTITIONER
Payer: MEDICARE

## 2024-12-17 ENCOUNTER — OFFICE VISIT (OUTPATIENT)
Facility: CLINIC | Age: 59
End: 2024-12-17

## 2024-12-17 VITALS
RESPIRATION RATE: 18 BRPM | SYSTOLIC BLOOD PRESSURE: 113 MMHG | HEART RATE: 110 BPM | TEMPERATURE: 98 F | DIASTOLIC BLOOD PRESSURE: 68 MMHG | OXYGEN SATURATION: 95 %

## 2024-12-17 VITALS — BODY MASS INDEX: 30.61 KG/M2 | WEIGHT: 226 LBS | HEIGHT: 72 IN

## 2024-12-17 DIAGNOSIS — I73.9 PERIPHERAL ARTERIAL DISEASE WITH HISTORY OF REVASCULARIZATION (HCC): ICD-10-CM

## 2024-12-17 DIAGNOSIS — E11.621 DIABETIC ULCER OF RIGHT MIDFOOT ASSOCIATED WITH TYPE 2 DIABETES MELLITUS, WITH BONE INVOLVEMENT WITHOUT EVIDENCE OF NECROSIS (HCC): ICD-10-CM

## 2024-12-17 DIAGNOSIS — Z98.890 PERIPHERAL ARTERIAL DISEASE WITH HISTORY OF REVASCULARIZATION (HCC): ICD-10-CM

## 2024-12-17 DIAGNOSIS — I77.9 CAROTID ARTERY DISEASE, UNSPECIFIED LATERALITY, UNSPECIFIED TYPE (HCC): ICD-10-CM

## 2024-12-17 DIAGNOSIS — Z79.4 UNCONTROLLED TYPE 2 DIABETES MELLITUS WITH HYPERGLYCEMIA, WITH LONG-TERM CURRENT USE OF INSULIN (HCC): ICD-10-CM

## 2024-12-17 DIAGNOSIS — E11.65 UNCONTROLLED TYPE 2 DIABETES MELLITUS WITH HYPERGLYCEMIA, WITH LONG-TERM CURRENT USE OF INSULIN (HCC): ICD-10-CM

## 2024-12-17 DIAGNOSIS — I70.244 ATHEROSCLEROSIS OF NATIVE ARTERY OF LEFT LOWER EXTREMITY WITH ULCERATION OF MIDFOOT (HCC): Primary | ICD-10-CM

## 2024-12-17 DIAGNOSIS — F17.200 TOBACCO DEPENDENCE WITH CURRENT USE: ICD-10-CM

## 2024-12-17 DIAGNOSIS — L97.416 DIABETIC ULCER OF RIGHT MIDFOOT ASSOCIATED WITH TYPE 2 DIABETES MELLITUS, WITH BONE INVOLVEMENT WITHOUT EVIDENCE OF NECROSIS (HCC): ICD-10-CM

## 2024-12-17 DIAGNOSIS — E11.621 DIABETIC ULCER OF LEFT MIDFOOT ASSOCIATED WITH TYPE 2 DIABETES MELLITUS, LIMITED TO BREAKDOWN OF SKIN (HCC): Primary | ICD-10-CM

## 2024-12-17 DIAGNOSIS — E11.42 DIABETIC PERIPHERAL NEUROPATHY ASSOCIATED WITH TYPE 2 DIABETES MELLITUS (HCC): ICD-10-CM

## 2024-12-17 DIAGNOSIS — L97.421 DIABETIC ULCER OF LEFT MIDFOOT ASSOCIATED WITH TYPE 2 DIABETES MELLITUS, LIMITED TO BREAKDOWN OF SKIN (HCC): Primary | ICD-10-CM

## 2024-12-17 PROCEDURE — 97597 DBRDMT OPN WND 1ST 20 CM/<: CPT | Performed by: NURSE PRACTITIONER

## 2024-12-17 NOTE — PROGRESS NOTES
Samer F. Najjar, MD  Vascular Surgery  [facility]      VASCULAR SURGERY   CLINIC CONSULT NOTE        Name: Yo Villagomez   :   3/27/1965  [id number]     REFERRING PHYSICIAN:  Alea Al  PRIMARY CARE PHYSICIAN:  Alea Al MD    HISTORY OF PRESENT ILLNESS:   Patient is a 59 year old male who has been referred regarding assessment of his bilateral lower extremity perfusion.  He has a history of diabetes and peripheral arterial disease and has previously undergone right lower extremity revascularization at [facility] with angioplasty of his tibial vessels after which he underwent right 5th ray amputation (10/2022).  Prior to that he underwent a left TMA (2021).  He states that the TMA actually healed but developed two plantar ulcers and he has been seen initially at the [facility] wound care center and now he is being seen at the [facility] as his primary care doctor is at [facility] .  He states that he would like to concentrate his physicians with the [facility] system.  He was a heavy smoker but now he has been trying to cut down to half a pack.  He has been noticing that his left lower extremity has moderate lymphedema and he has not worn compression stockings.    PAST MEDICAL HISTORY:    Past Medical History:    Diabetes (HCC)    Diabetic neuropathy ([facility])    Dyslipidemia    ED (erectile dysfunction)    Erectile dysfunction    Hypertension    Necrotizing soft tissue infection    Obesity    Other diabetic neurological complication associated with type 2 diabetes mellitus (HCC)    PAD (peripheral artery disease) (HCC)    Sepsis (HCC)    Smoker       PAST SURGICAL HISTORY:   No past surgical history on file.     MEDICATIONS:     Current Outpatient Medications:     aspirin 81 MG Oral Tab EC, Take 1 tablet (81 mg total) by mouth daily., Disp: 90 tablet, Rfl: 3    clopidogrel 75 MG Oral Tab, Take 1 tablet (75 mg total) by mouth nightly., Disp: 90 tablet, Rfl: 3    empagliflozin (JARDIANCE) 10  MG Oral Tab, Take 1 tablet (10 mg total) by mouth daily., Disp: 90 tablet, Rfl: 3    glipiZIDE 10 MG Oral Tab, Take 1 tablet (10 mg total) by mouth 2 (two) times daily before meals., Disp: 180 tablet, Rfl: 3    Glucose Blood (ONETOUCH VERIO) In Vitro Strip, Check glucose three times daily E11.65, Disp: 100 strip, Rfl: 1    insulin degludec (TRESIBA FLEXTOUCH) 200 UNIT/ML Subcutaneous Solution Pen-injector, Inject 20 Units into the skin nightly., Disp: 9 mL, Rfl: 3    Insulin Pen Needle (BD PEN NEEDLE HAL 2ND GEN) 32G X 4 MM Does not apply Misc, 1 NEEDLE AT BEDTIME. USE WITH TRESIBA NIGHTLY E11.65, Disp: 100 each, Rfl: 3    metFORMIN HCl 1000 MG Oral Tab, Take 1 tablet (1,000 mg total) by mouth 2 (two) times daily., Disp: 90 tablet, Rfl: 3    OneTouch UltraSoft 2 Lancets Does not apply Misc, 1 Lancet in the morning, at noon, and at bedtime. Check glucose three times daily E11.65, Disp: 100 each, Rfl: 3    simvastatin 20 MG Oral Tab, Take 1 tablet (20 mg total) by mouth daily., Disp: 90 tablet, Rfl: 3    Blood Glucose Monitoring Suppl (ONETOUCH ULTRA 2) w/Device Does not apply Kit, Check glucose three times daily E11.65, Disp: 1 kit, Rfl: 0    Continuous Glucose Sensor (FREESTYLE BRIDGER 2 SENSOR) Does not apply Misc, Inject 1 Device into the skin every 14 (fourteen) days., Disp: , Rfl:     ALLERGIES:    He is allergic to ace inhibitors.    SOCIAL HISTORY:    Patient  reports that he has been smoking cigarettes. He started smoking about 15 years ago. He has a 16 pack-year smoking history. He has never used smokeless tobacco. He reports current alcohol use of about 1.0 standard drink of alcohol per week. He reports that he does not use drugs.    FAMILY HISTORY:    Patient's family history includes Diabetes in his brother and mother; Hyperlipidemia in his mother.    ROS:     A 12 point review of systems with pertinent positives and negatives listed in the HPI.    EXAM:    Ht 6' (1.829 m)   Wt 226 lb (102.5 kg)   BMI  30.65 kg/m²   GENERAL: alert and orientated X 3, well developed, well nourished, in no apparent distress  PSYCH: normal mood and affect  HEENT: ears and throat are clear  NECK: supple, no lymphadenopathy, thyroid wnl  CAROTID: no bruits  RESPIRATORY: no rales, rhonchi, or wheezes B  CARDIO: RRR without murmur, no murmur, no gallop   ABDOMEN: soft, non-tender with no palpable aneurysm or masses  BACK: normal, no tenderness  SKIN: no rashes, warm and dry  EXTREMITIES: no tenderness  NEURO: no sensory or motor deficits  VASCULAR:      Femoral Popliteal DP PT Peroneal   Right 2+       palpable, weak palpable, weak    Left 2+       biphasic signal biphasic signal      The right foot has a ray amputation of the fifth toe that extends into the mid segment foot.  There is heavy scarring and chronic scabs present but no open wounds.  On the left,  he has aa healed TMA with fibrotic lymphatic skin changes at the incision area with 2 areas of plantar chronic ulcerations with healthy appearing granulation tissues.      LABS:   Lab Results   Component Value Date     (H) 11/12/2024    A1C 9.4 (H) 11/12/2024      Lab Results   Component Value Date     (H) 11/12/2024    BUN 22 11/12/2024    CREATSERUM 1.27 11/12/2024    BUNCREA 17.3 11/12/2024    ANIONGAP 2 11/12/2024    CA 9.8 11/12/2024     11/12/2024    K 5.0 11/12/2024     11/12/2024    CO2 28.0 11/12/2024    OSMOCALC 302 (H) 11/12/2024      Lab Results   Component Value Date    WBC 10.4 05/13/2024    RBC 4.84 05/13/2024    HGB 13.4 05/13/2024    HCT 39.7 05/13/2024    MCV 82.0 05/13/2024    MCH 27.7 05/13/2024    MCHC 33.8 05/13/2024    RDW 12.9 05/13/2024    .0 05/13/2024        Lab Results   Component Value Date    HGB 13.4 05/13/2024    CREATSERUM 1.27 11/12/2024    CREATSERUM 1.50 (H) 05/13/2024         ASSESSMENT/PLAN:    I have reviewed the patient's prior documentation and studies from Epic and from [email or web address].    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  On the right side, he has palpable pulses that are weak but he would benefit from debridement of the scabbed skin in order to ensure proper healing with known future development of ulcerations.  On the left he may require more work as he does have ulcerations that are open and can predispose him to foot infections.  On the right side, his perfusion is adequate and on the left it is likely adequate, but I would like him to undergo arterial duplex to better assess his perfusion.    I have referred him to a Dr. Holliday from the podiatry service.  We will be coordinating our efforts to allow him to fully heal his wounds.  We discussed the importance of smoking cessation and continuation of his antiplatelet therapy to decrease his overall cardiovascular risk.  Given his long smoking history, I have recommended a carotid duplex.    The patient indicated an understanding of these issues and agreed to the plan and all questions were answered during the clinic visit.      Thank you for allowing me to participate in your patient's care.   Please do not hesitate to contact me with any questions.    Sincerely,  Samer F. Najjar, MD    Please note: Dragon speech recognition software was used to prepare this note. If a word or phrase is confusing, it is likely do to a failure of recognition.   Please contact me with any questions or clarifications.

## 2024-12-17 NOTE — PROGRESS NOTES
Chief Complaint   Patient presents with    Wound Recheck     Pt presents today for BL foot wound recheck and denies any new acute sx's of concern.       HPI:   12/17/24: F/u visit for b/l foot wounds. Saw Dr. Najjar prior to this appointment. Referred for b/l arterial duplex and carotid dopplers. Pt also has upcoming appointment with Dr. Holliday on 1/20/25. Pt reports he started using the Dakin's dressing a few days ago.     12/12/24: Pt returns to wound care, was lost to follow up over past 6 months. Says he has been cleaning the wounds and keeping them covered. Wants to get back on track with health. He was recently started on insulin by his PCP. He doesn't check his BG regularly. Does his best to minimize weight bearing. Has appt to see vascular surgery next week. PCP has also referred to podiatry, no appt yet. Says he still smokes cigarettes, trying to cut back. Drinks wine once a week and denies recreational drug use at this time.     6/11/24: F/u left DFU. Saw his PCP last week, says he started on insulin. Received podiatry referral as well, has not scheduled appointment. Did not receive vascular surgery referral. Reports he has been doing well with dressing changes BID for the past week. Reports the swelling in his leg occurs if he is up and about for 3 hours, resolves when he sits and elevates legs. He denies fever, chill, pain. Says Denies new concerns.    6/5/24: F/u left DFU. Had difficulty filling Rx for Dakin's. Says he picked up at a specialty pharmacy yesterday, 1/2 strength instead of 1/4 strength. Reports he has been doing well, denies change in health since last visit. Has a follow up visit with his PCP tomorrow, will request updated referrals for [facility] and vascular surgery.     5/23/24: F/u visit for left foot wounds. Didn't  Dakin's solution after last visit and hasn't received gauze and tape from [facility] [place], therefore, says he has continued to apply betadine and gauze to  wounds. Denies change in health since last visit, no new concerns.    5/16/24: 59 yr old male here today for chronic wound on left foot. His current medical problems include uncontrolled T2DM (A1c 15.5% on 5/13/24), diabetic neuropathy, tobacco use (1/2 PPD), HLD, PAD. 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 He underwent RLE angiogram with intervention 2/20/23. He reports he had been seeing a podiatrist regularly until his insurance changed this year. He noticed wounds developed on his left foot about 4 months ago. He  has been applying betadine and covering with gauze almost every day. He denies drainage from the right foot and has noticed the skin changes over the past year as well. He has chronic intermittent pain in both feet for which he reports he previously received [place] from his podiatrist. He used to wear a boot to offload wounds, no longer has it. He tries to walk on his heel as much as possible. PCP ordered a cane, but is having a hard time getting from DME company. He lives alone, says he stays with his mother often. He has cut back on tobacco use from 1 PPD to 1/2 PPD. He denies recent alcohol use and denies recent illicit substance use.     Lab Results   Component Value Date    BUN 22 11/12/2024    CREATSERUM 1.27 11/12/2024    ALB 4.0 05/13/2024    TP 7.8 05/13/2024    A1C 9.4 (H) 11/12/2024     Pertinent Imaging/Results (copy/pasted from Care Everywhere--[facility]):    LACIE 1/23/2023  Garry Fournier MD - 01/23/2023   Formatting of this note might be different from the original.   EXAM:   ABIs COMPLETE     HISTORY:   PAD     COMPARISON:   None     TECHNIQUE:   Segmental arterial pressures and Doppler waveforms at three arterial levels in the leg, and toe plethysmography.     FINDINGS:   RIGHT: LACIE 0.61, toe brachial index 0.23, toe pressure 32 mmHg. Brachial pressure 137 mmHg.     Right leg waveforms as follows:   Common femoral: Triphasic   Superficial  femoral:  Triphasic   Popliteal:  Triphasic   Posterior tibial:  Monophasic   Dorsalis pedis:  Monophasic     LEFT: LACIE 0.91, toe brachial index was unable to be performed due to amputation. Brachial pressure 130 mmHg.     Left leg waveforms as follows:   Common femoral: Triphasic   Superficial femoral:  Triphasic   Popliteal:  Triphasic   Posterior tibial:  Triphasic   Dorsalis pedis:  Triphasic     IMPRESSION:   1. On the right, there is there is moderate evidence of arterial insufficiency by waveform and LACIE criteria. There is severe pedal arterial insufficiency by TBI criteria (TBI 0.23) and waveform amplitude, suggestive of poor wound healing potential.     2.  On the left, there is mild arterial insufficiency by waveform and TBI criteria. Unable to perform TBI due to amputation..     IR Arteriogram Lower Extremity Unilateral/Bilateral 2/20/23  Impression:  1. Right lower extremity angiogram demonstrates focal moderate popliteal stenosis, moderate stenosis of the proximal to mid peroneal artery and occluded PT occluded reconstituted at the ankle. The AT is occluded with only DP reconstitution.  2. Successful revascularization of the distal popliteal, TPT trunk, Peroneal artery and posterior tibial artery with POBA.  3. Balloon angioplasty of the peroneal artery, tibioperoneal trunk, and posterior tibial artery. Proximal aspects of each tibial vessel were scoring balloon angioplasty.  4. 4mm [place] DCB treatment of the distal pop extending to the proximal PT.      Current Outpatient Medications:     aspirin 81 MG Oral Tab EC, Take 1 tablet (81 mg total) by mouth daily., Disp: 90 tablet, Rfl: 3    clopidogrel 75 MG Oral Tab, Take 1 tablet (75 mg total) by mouth nightly., Disp: 90 tablet, Rfl: 3    empagliflozin (JARDIANCE) 10 MG Oral Tab, Take 1 tablet (10 mg total) by mouth daily., Disp: 90 tablet, Rfl: 3    glipiZIDE 10 MG Oral Tab, Take 1 tablet (10 mg total) by mouth 2 (two) times daily before meals., Disp:  180 tablet, Rfl: 3    Glucose Blood (ONETOUCH VERIO) In Vitro Strip, Check glucose three times daily E11.65, Disp: 100 strip, Rfl: 1    insulin degludec (TRESIBA FLEXTOUCH) 200 UNIT/ML Subcutaneous Solution Pen-injector, Inject 20 Units into the skin nightly., Disp: 9 mL, Rfl: 3    Insulin Pen Needle (BD PEN NEEDLE HAL 2ND GEN) 32G X 4 MM Does not apply Misc, 1 NEEDLE AT BEDTIME. USE WITH TRESIBA NIGHTLY E11.65, Disp: 100 each, Rfl: 3    metFORMIN HCl 1000 MG Oral Tab, Take 1 tablet (1,000 mg total) by mouth 2 (two) times daily., Disp: 90 tablet, Rfl: 3    OneTouch UltraSoft 2 Lancets Does not apply Misc, 1 Lancet in the morning, at noon, and at bedtime. Check glucose three times daily E11.65, Disp: 100 each, Rfl: 3    simvastatin 20 MG Oral Tab, Take 1 tablet (20 mg total) by mouth daily., Disp: 90 tablet, Rfl: 3    Blood Glucose Monitoring Suppl (ONETOUCH ULTRA 2) w/Device Does not apply Kit, Check glucose three times daily E11.65, Disp: 1 kit, Rfl: 0    Continuous Glucose Sensor (FREESTYLE BRIDGER 2 SENSOR) Does not apply Misc, Inject 1 Device into the skin every 14 (fourteen) days., Disp: , Rfl:     Allergies   Allergen Reactions    Ace Inhibitors UNKNOWN      REVIEW OF SYSTEMS:   Review of Systems   Constitutional:  Negative for activity change, chills and fever.   Respiratory:  Negative for shortness of breath.    Cardiovascular:  Negative for chest pain and leg swelling.   Gastrointestinal:  Negative for abdominal pain, diarrhea and vomiting.   Musculoskeletal:  Positive for arthralgias and gait problem.   Skin:  Positive for wound. Negative for rash.   Neurological:  Negative for dizziness, numbness and headaches.      PHYSICAL EXAM:   /68 (BP Location: Right arm, Patient Position: Sitting, Cuff Size: adult)   Pulse 110   Temp 97.8 °F (36.6 °C) (Oral)   Resp 18   SpO2 95%    Estimated body mass index is 30.65 kg/m² as calculated from the following:    Height as of an earlier encounter on 12/17/24:  72\".    Weight as of an earlier encounter on 12/17/24: 226 lb.   Vital signs reviewed.Appears stated age, well groomed.    Physical Exam  Constitutional:       General: He is not in acute distress.     Appearance: Normal appearance. He is obese. He is not ill-appearing.   HENT:      Head: Normocephalic and atraumatic.   Cardiovascular:      Pulses:           Dorsalis pedis pulses are 2+ on the right side and 1+ on the left side.        Posterior tibial pulses are 2+ on the right side and 1+ on the left side.   Pulmonary:      Effort: Pulmonary effort is normal.   Musculoskeletal:      Right foot: Deformity present.      Left foot: Deformity present.      Right Lower Extremity: Right leg is amputated below ankle. (Right 5th toe)     Left Lower Extremity: Left leg is amputated below ankle. (TMA)  Feet:      Right foot:      Skin integrity: Ulcer, callus and dry skin present. No erythema or warmth.      Toenail Condition: Right toenails are abnormally thick and long.      Left foot:      Skin integrity: Ulcer, callus and dry skin present. No erythema or warmth.      Toenail Condition: Left toenails are abnormally thick and long.      Comments: Right PT and DP with biphasic wave sounds, Left PT with monophasic wave sounds using handheld doppler.   Skin:     General: Skin is warm and dry.      Capillary Refill: Capillary refill takes 2 to 3 seconds.      Findings: Wound (left foot) present.   Neurological:      General: No focal deficit present.      Mental Status: He is alert and oriented to person, place, and time.   Psychiatric:         Mood and Affect: Mood normal.         Behavior: Behavior normal.        Wound 12/12/24 1 Foot Plantar;Left (Active)   Date First Assessed/Time First Assessed: 12/12/24 1101    Wound Number (Wound Clinic Only): 1  Primary Wound Type: Diabetic Ulcer  Location: Foot  Wound Location Orientation: Plantar;Left      Assessments 12/12/2024 11:04 AM 12/17/2024  2:19 PM   Wound Image        Site  Assessment Moist;Red;Granulation tissue Moist;Red;Granulation tissue   Closure Not approximated Not approximated   Drainage Amount Moderate Moderate   Drainage Description Serosanguineous Serosanguineous   Treatments Cleansed;Saline;Wound ;Dakins;Shoe Cleansed;Saline;Wound ;Dakins;Shoe   Shoe Type Felt Felt   Dressing Gauze;Tape Gauze;Tape   Dressing Changed New Changed   Dressing Status Clean;Dry;Intact Clean;Dry;Intact   Wound Length (cm) 2.3 cm 2.5 cm   Wound Width (cm) 2.3 cm 3 cm   Wound Surface Area (cm^2) 5.29 cm^2 7.5 cm^2   Wound Depth (cm) 1 cm 0.7 cm   Wound Volume (cm^3) 5.29 cm^3 5.25 cm^3   Wound Healing % -- 1   Margins Well-defined edges;Not attached Well-defined edges;Not attached   Non-staged Wound Description Full thickness Full thickness   Marcy-wound Assessment Dry;Callous Dry;Callous   Wound Granulation Tissue Red Red   Wound Bed Granulation (%) 100 % 100 %   Wound Bed Epithelium (%) 0 % 0 %   Wound Bed Slough (%) 0 % 0 %   Wound Bed Eschar (%) 0 % 0 %   Wound Bed Fibrin (%) 0 % 0 %   State of Healing Undermining;Hyperkeratosis;Non-healing Undermining;Hyperkeratosis;Non-healing   Wound Odor Strong Strong   Undermining? Yes Yes   Number of Undermines 1 1   Undermine 1 Start Position 9 9   Undermine 1 End Position 6 1   Nunn Scale -- Grade 2       Active Orders   Date Order Priority Status Authorizing Provider   12/17/24 1650 OP Wound Dressing Routine Active Maite Goddard APRN     - Cleansing:    Cleanse with Dakins     - Cleansing:    Cleanse with normal saline or wound cleanser     - Dressing:    Wet to dry     - Additional Wound Dressing Information:    All wounds: Zinc oxide to periwound, Dakin's moistened gauze into wound bed, dry gauze, tape. Offloading felt x 2 to left foot.     - Frequency:    Change dressing two times a day   12/17/24 1445 Debridement Diabetic Ulcer Plantar;Left Foot Routine Active Maite Goddard APRN       Inactive Orders   Date Order Priority  Status Authorizing Provider   12/12/24 1205 Debridement Diabetic Ulcer Plantar;Left Foot Routine Completed Miate Goddard APRN       Wound 12/12/24 2 Foot Left;Lateral;Plantar (Active)   Date First Assessed/Time First Assessed: 12/12/24 1102    Wound Number (Wound Clinic Only): 2  Primary Wound Type: Diabetic Ulcer  Location: Foot  Wound Location Orientation: Left;Lateral;Plantar      Assessments 12/12/2024 11:04 AM 12/17/2024  2:19 PM   Wound Image        Site Assessment Moist;Red;Granulation tissue Moist;Red;Granulation tissue   Closure Not approximated Not approximated   Drainage Amount Moderate Moderate   Drainage Description Serosanguineous Serosanguineous   Treatments Cleansed;Saline;Wound ;Dakins;Shoe Cleansed;Saline;Wound ;Dakins;Shoe   Shoe Type Felt Felt   Dressing Gauze;Tape Gauze;Tape   Dressing Changed New Changed   Dressing Status Clean;Dry;Intact Clean;Dry;Intact   Wound Length (cm) 2.4 cm 2 cm   Wound Width (cm) 1.7 cm 1.8 cm   Wound Surface Area (cm^2) 4.08 cm^2 3.6 cm^2   Wound Depth (cm) 0.7 cm 0.5 cm   Wound Volume (cm^3) 2.856 cm^3 1.8 cm^3   Wound Healing % -- 37   Margins Well-defined edges;Not attached Well-defined edges;Not attached   Non-staged Wound Description Full thickness Full thickness   Marcy-wound Assessment Dry;Callous Dry;Callous   Wound Granulation Tissue Red Red   Wound Bed Granulation (%) 100 % 100 %   Wound Bed Epithelium (%) 0 % 0 %   Wound Bed Slough (%) 0 % 0 %   Wound Bed Eschar (%) 0 % 0 %   Wound Bed Fibrin (%) 0 % 0 %   State of Healing Undermining;Hyperkeratosis;Non-healing Undermining;Hyperkeratosis;Non-healing   Wound Odor Strong Strong   Undermining? Yes Yes   Number of Undermines 1 1   Undermine 1 Start Position 7 7   Undermine 1 End Position 12 12       Active Orders   Date Order Priority Status Authorizing Provider   12/17/24 1650 OP Wound Dressing Routine Active Maite Goddard APRN     - Cleansing:    Cleanse with Dakins     - Cleansing:     Cleanse with normal saline or wound cleanser     - Dressing:    Wet to dry     - Additional Wound Dressing Information:    All wounds: Zinc oxide to periwound, Dakin's moistened gauze into wound bed, dry gauze, tape. Offloading felt x 2 to left foot.     - Frequency:    Change dressing two times a day   12/17/24 1457 Debridement Diabetic Ulcer Left;Lateral;Plantar Foot Routine Active Maite Goddard APRN       Inactive Orders   Date Order Priority Status Authorizing Provider   12/12/24 1207 Debridement Diabetic Ulcer Left;Lateral;Plantar Foot Routine Completed Maite Goddard APRN       Wound 12/12/24 3 Foot Right;Lateral (Active)   Date First Assessed/Time First Assessed: 12/12/24 1122    Wound Number (Wound Clinic Only): 3  Primary Wound Type: Diabetic Ulcer  Location: Foot  Wound Location Orientation: Right;Lateral      Assessments 12/12/2024 11:04 AM 12/17/2024  2:19 PM   Wound Image         Site Assessment Dry;Brown Dry;Brown   Drainage Amount None None   Drainage Description Scabbed Scabbed   Treatments Cleansed;Wound ;Topical (Barrier/Moisturizer/Ointment);Dakins Cleansed;Wound ;Topical (Barrier/Moisturizer/Ointment)   Dressing Gauze;Tape Gauze;Tape   Dressing Changed New New   Dressing Status Clean;Dry;Intact Clean;Dry;Intact   Wound Length (cm) 8.6 cm 8 cm   Wound Width (cm) 5 cm 5 cm   Wound Surface Area (cm^2) 43 cm^2 40 cm^2   Wound Depth (cm) 0.1 cm 0.5 cm   Wound Volume (cm^3) 4.3 cm^3 20 cm^3   Wound Healing % -- -365   Margins Poorly defined Poorly defined   Non-staged Wound Description Not applicable Full thickness   Marcy-wound Assessment Dry;Callous Dry;Brown;Callous   Wound Bed Granulation (%) 0 % 0 %   Wound Bed Epithelium (%) 0 % 0 %   Wound Bed Slough (%) 0 % 0 %   Wound Bed Eschar (%) 0 % 0 %   Wound Bed Fibrin (%) 100 % 100 %   State of Healing Non-healing;Hyperkeratosis Non-healing;Hyperkeratosis   Wound Odor Strong Strong   Shape Cluster of 2 Cluster of 2       Active  Orders   Date Order Priority Status Authorizing Provider   12/17/24 1650 OP Wound Dressing Routine Active Maite Goddard APRN     - Cleansing:    Cleanse with Dakins     - Cleansing:    Cleanse with normal saline or wound cleanser     - Dressing:    Wet to dry     - Additional Wound Dressing Information:    All wounds: Zinc oxide to periwound, Dakin's moistened gauze into wound bed, dry gauze, tape. Offloading felt x 2 to left foot.     - Frequency:    Change dressing two times a day       Inactive Orders   Date Order Priority Status Authorizing Provider   12/12/24 1208 Debridement Diabetic Ulcer Right;Lateral Foot Routine Completed Maite Goddard APRN     Procedures  ASSESSMENT AND PLAN:      1. Diabetic ulcer of left midfoot associated with type 2 diabetes mellitus, limited to breakdown of skin (HCC)  - OP Wound Dressing; Standing  - MRI FOOT (W+WO), LEFT (CPT=73720); Future    2. Diabetic ulcer of right midfoot associated with type 2 diabetes mellitus, with bone involvement without evidence of necrosis (HCC)  - OP Wound Dressing; Standing  - MRI FOOT (W+WO), RIGHT (CPT=73720); Future    F/u b/l DFUs. He is s/p L TMA 11/28/21 and right 5th ray amputation 10/2022. Wounds have been present for >1 year. He has PAD with hx of revascularizations, most recently on RLE 2/20/23 at [facility]. He has palpaple PT/DP of the right foot with biphasic wave sounds. The left PT and DP with faint monophasic wave sounds. Hindering factors for wound healing include uncontrolled T2DM, tobacco use, PAD and peripheral neuropathy.  Wound beds on left foot are granular, dry. Wound on R foot is small, difficult to visualize as it is deep/narrow within the callus on the lateral plantar surface. All periwound w/callus, hyperkeratosis and maceration. Wound odor remains strong. There is no erythema, swelling, increased pain or fever or other s/sx of soft tissue infection. Callus and biofilm debrided from left foot wounds (see procedure note).  Reviewed wound culture results with patient. Will plan to treat topically with 1/4 strength Dakin's for now.   Wound care: Zinc oxide to periwound, Dakin's 1/4 strength wet to moist gauze dressing BID, felt offloading pads applied to both feet  -Schedule arterial US per vascular recs  -MRI of both feet ordered to evaluate for OM  -Pt has upcoming appt with podiatry, Dr. Holliday 1/20/25    He was advised to continue to monitor for signs and symptoms of infection, encouraged him to assess skin daily, seek immediate care if he develops any s/sx of infection.  Encouraged smoking cessation along with diabetes control, discussed impact on wound healing. Would benefit from diabetes education.  Continue nutrition for wound healing -- reduced carbohydrate intake, increased protein intake, and healthy diet. Recommended increased vitamin intake (either with foods or vitamin supplements), need vitamin A, Bs, C, D and zinc for wound healing.   Risks, benefits, and alternatives of current treatment plan discussed in detail.  Questions and concerns addressed. Red flags to RTC or ED reviewed.  Patient agrees to plan.      Return in about 2 weeks (around 12/31/2024) for apn visit for 30 min.    I spent 30 minutes with the patient. This time included:  preparing to see the patient (eg, review notes and recent diagnostics), seeing the patient, performing a medically appropriate examination and/or evaluation, counseling and educating the patient, and documenting in the record.    NOTE TO PATIENT: The 21st Century Cures Act makes clinical notes like these available to patients in the interest of transparency. Clinical notes are medical documents used by physicians and care providers to communicate with each other. These documents include medical language and terminology, abbreviations, and treatment information that may sound technical and at times possibly unfamiliar. In addition, at times, the verbiage may appear blunt or direct. These  documents are one tool providers use to communicate relevant information and clinical opinions of the care providers in a way that allows common understanding of the clinical context.

## 2024-12-17 NOTE — PROGRESS NOTES
Patient ID: Yo Villagomez is a 59 year old male.    Debridement Diabetic Ulcer Plantar;Left Foot   Wound 12/12/24 1 Foot Plantar;Left    Performed by: Maite Goddard APRN  Authorized by: Maite Goddard APRN      Consent   Consent obtained? verbal  Consent given by: patient  Risks discussed? procedural risks discussed  Time out called at 12/17/2024 2:45 PM  Immediately prior to the procedure a time out was called and the performing provider verified the correct patient, procedure, equipment, support staff, and site/side marked as required.    Debridement Details  Performed by: NP  Debridement type: conservative sharp  Pain control: none    Pre-debridement measurements  Length (cm): 2.5  Width (cm): 3  Depth (cm): 0.7  Surface Area (cm^2): 7.5    Post-debridement measurements  Length (cm): 2.5  Width (cm): 3  Depth (cm): 0.7  Percent debrided: 100%  Surface Area (cm^2): 7.5  Area Debrided (cm^2): 7.5  Volume (cm^3): 5.25    Devitalized tissue debrided: biofilm and callus  Instrument(s) utilized: blade  Comment regarding bleeding: scant  Hemostasis obtained with: not applicable  Procedural pain (0-10): insensate  Post-procedural pain: insensate   Response to treatment: procedure was tolerated well    Debridement Diabetic Ulcer Left;Lateral;Plantar Foot   Wound 12/12/24 2 Foot Left;Lateral;Plantar    Performed by: Maite Goddard APRN  Authorized by: Maite Goddard APRN      Consent   Consent obtained? verbal  Consent given by: patient  Risks discussed? procedural risks discussed  Time out called at 12/17/2024 2:57 PM  Immediately prior to the procedure a time out was called and the performing provider verified the correct patient, procedure, equipment, support staff, and site/side marked as required.    Debridement Details  Performed by: NP  Debridement type: conservative sharp  Pain control: none    Pre-debridement measurements  Length (cm): 2  Width (cm): 1.8  Depth (cm): 0.5  Surface Area (cm^2):  3.6    Post-debridement measurements  Length (cm): 2  Width (cm): 1.8  Depth (cm): 0.5  Percent debrided: 80%  Surface Area (cm^2): 3.6  Area Debrided (cm^2): 2.88  Volume (cm^3): 1.8    Devitalized tissue debrided: biofilm and callus  Instrument(s) utilized: blade and curette  Comment regarding bleeding: scant  Hemostasis obtained with: not applicable  Procedural pain (0-10): insensate  Post-procedural pain: insensate   Response to treatment: procedure was tolerated well

## 2025-01-08 ENCOUNTER — OFFICE VISIT (OUTPATIENT)
Dept: WOUND CARE | Facility: HOSPITAL | Age: 60
End: 2025-01-08
Attending: NURSE PRACTITIONER
Payer: MEDICARE

## 2025-01-08 VITALS
HEART RATE: 100 BPM | TEMPERATURE: 98 F | RESPIRATION RATE: 20 BRPM | OXYGEN SATURATION: 98 % | DIASTOLIC BLOOD PRESSURE: 77 MMHG | SYSTOLIC BLOOD PRESSURE: 137 MMHG

## 2025-01-08 DIAGNOSIS — L97.421 DIABETIC ULCER OF LEFT MIDFOOT ASSOCIATED WITH TYPE 2 DIABETES MELLITUS, LIMITED TO BREAKDOWN OF SKIN (HCC): Primary | ICD-10-CM

## 2025-01-08 DIAGNOSIS — Z79.4 UNCONTROLLED TYPE 2 DIABETES MELLITUS WITH HYPERGLYCEMIA, WITH LONG-TERM CURRENT USE OF INSULIN (HCC): ICD-10-CM

## 2025-01-08 DIAGNOSIS — Z98.890 PERIPHERAL ARTERIAL DISEASE WITH HISTORY OF REVASCULARIZATION (HCC): ICD-10-CM

## 2025-01-08 DIAGNOSIS — E11.621 DIABETIC ULCER OF RIGHT MIDFOOT ASSOCIATED WITH TYPE 2 DIABETES MELLITUS, WITH BONE INVOLVEMENT WITHOUT EVIDENCE OF NECROSIS (HCC): ICD-10-CM

## 2025-01-08 DIAGNOSIS — E11.621 DIABETIC ULCER OF LEFT MIDFOOT ASSOCIATED WITH TYPE 2 DIABETES MELLITUS, LIMITED TO BREAKDOWN OF SKIN (HCC): Primary | ICD-10-CM

## 2025-01-08 DIAGNOSIS — I73.9 PERIPHERAL ARTERIAL DISEASE WITH HISTORY OF REVASCULARIZATION (HCC): ICD-10-CM

## 2025-01-08 DIAGNOSIS — F17.200 TOBACCO DEPENDENCE WITH CURRENT USE: ICD-10-CM

## 2025-01-08 DIAGNOSIS — L97.416 DIABETIC ULCER OF RIGHT MIDFOOT ASSOCIATED WITH TYPE 2 DIABETES MELLITUS, WITH BONE INVOLVEMENT WITHOUT EVIDENCE OF NECROSIS (HCC): ICD-10-CM

## 2025-01-08 DIAGNOSIS — E11.65 UNCONTROLLED TYPE 2 DIABETES MELLITUS WITH HYPERGLYCEMIA, WITH LONG-TERM CURRENT USE OF INSULIN (HCC): ICD-10-CM

## 2025-01-08 PROCEDURE — 97597 DBRDMT OPN WND 1ST 20 CM/<: CPT | Performed by: NURSE PRACTITIONER

## 2025-01-08 NOTE — PROGRESS NOTES
Patient ID: Yo Villagomez is a 59 year old male.    Debridement Diabetic Ulcer Plantar;Left Foot   Wound 12/12/24 1 Foot Plantar;Left    Performed by: Maite Goddard APRN  Authorized by: Maite Goddard APRN      Consent   Consent obtained? verbal  Consent given by: patient  Risks discussed? procedural risks discussed  Time out called at 1/8/2025 1:47 PM  Immediately prior to the procedure a time out was called and the performing provider verified the correct patient, procedure, equipment, support staff, and site/side marked as required.    Debridement Details  Performed by: NP  Debridement type: conservative sharp  Pain control: none    Pre-debridement measurements  Length (cm): 1.9  Width (cm): 2.4  Depth (cm): 0.5  Surface Area (cm^2): 4.56    Post-debridement measurements  Length (cm): 1.9  Width (cm): 2.4  Depth (cm): 0.5  Percent debrided: 100%  Surface Area (cm^2): 4.56  Area Debrided (cm^2): 4.56  Volume (cm^3): 2.28    Devitalized tissue debrided: biofilm and callus  Instrument(s) utilized: blade  Bleeding: none  Hemostasis obtained with: not applicable  Procedural pain (0-10): 0  Post-procedural pain: 0   Response to treatment: procedure was tolerated well    Debridement Diabetic Ulcer Left;Lateral;Plantar Foot   Wound 12/12/24 2 Foot Left;Lateral;Plantar    Performed by: Maite Goddard APRN  Authorized by: Maite Goddard APRN      Consent   Consent obtained? verbal  Consent given by: patient  Risks discussed? procedural risks discussed  Time out called at 1/8/2025 1:48 PM  Immediately prior to the procedure a time out was called and the performing provider verified the correct patient, procedure, equipment, support staff, and site/side marked as required.    Debridement Details  Performed by: NP  Debridement type: conservative sharp    Pre-debridement measurements  Length (cm): 1.6  Width (cm): 1.7  Depth (cm): 0.4  Surface Area (cm^2): 2.72    Post-debridement measurements  Length (cm):  1.6  Width (cm): 1.7  Depth (cm): 0.4  Percent debrided: 100%  Surface Area (cm^2): 2.72  Area Debrided (cm^2): 2.72  Volume (cm^3): 1.09    Devitalized tissue debrided: biofilm and callus  Instrument(s) utilized: blade  Bleeding: none  Hemostasis obtained with: not applicable  Procedural pain (0-10): 0  Post-procedural pain: 0   Response to treatment: procedure was tolerated well

## 2025-01-08 NOTE — PROGRESS NOTES
Chief Complaint   Patient presents with    Wound Care     Follow up visit for bilateral foot wounds.     HPI:   1/8/25: F/u b/l foot wounds. No drainage from the right foot. Has been applying Dakins dressing BID since last visit. Hasn't scheduled vascular testing or MRI yet. Says he is feeling well, denies change in health since last visit.    12/17/24: F/u visit for b/l foot wounds. Saw Dr. Najjar prior to this appointment. Referred for b/l arterial duplex and carotid dopplers. Pt also has upcoming appointment with Dr. Holliday on 1/20/25. Pt reports he started using the Dakin's dressing a few days ago.     12/12/24: Pt returns to wound care, was lost to follow up over past 6 months. Says he has been cleaning the wounds and keeping them covered. Wants to get back on track with health. He was recently started on insulin by his PCP. He doesn't check his BG regularly. Does his best to minimize weight bearing. Has appt to see vascular surgery next week. PCP has also referred to podiatry, no appt yet. Says he still smokes cigarettes, trying to cut back. Drinks wine once a week and denies recreational drug use at this time.     6/11/24: F/u left DFU. Saw his PCP last week, says he started on insulin. Received podiatry referral as well, has not scheduled appointment. Did not receive vascular surgery referral. Reports he has been doing well with dressing changes BID for the past week. Reports the swelling in his leg occurs if he is up and about for 3 hours, resolves when he sits and elevates legs. He denies fever, chill, pain. Says Denies new concerns.    6/5/24: F/u left DFU. Had difficulty filling Rx for Dakin's. Says he picked up at a specialty pharmacy yesterday, 1/2 strength instead of 1/4 strength. Reports he has been doing well, denies change in health since last visit. Has a follow up visit with his PCP tomorrow, will request updated referrals for [facility] and vascular surgery.     5/23/24: F/u visit  for left foot wounds. Didn't  Dakin's solution after last visit and hasn't received gauze and tape from [employer], therefore, says he has continued to apply betadine and gauze to wounds. Denies change in health since last visit, no new concerns.    5/16/24: 59 yr old male here today for chronic wound on left foot. His current medical problems include uncontrolled T2DM (A1c 15.5% on 5/13/24), diabetic neuropathy, tobacco use (1/2 PPD), HLD, PAD. 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 He underwent RLE angiogram with intervention 2/20/23. He reports he had been seeing a podiatrist regularly until his insurance changed this year. He noticed wounds developed on his left foot about 4 months ago. He  has been applying betadine and covering with gauze almost every day. He denies drainage from the right foot and has noticed the skin changes over the past year as well. He has chronic intermittent pain in both feet for which he reports he previously received [place] from his podiatrist. He used to wear a boot to offload wounds, no longer has it. He tries to walk on his heel as much as possible. PCP ordered a cane, but is having a hard time getting from [employer]. He lives alone, says he stays with his mother often. He has cut back on tobacco use from 1 PPD to 1/2 PPD. He denies recent alcohol use and denies recent illicit substance use.     Lab Results   Component Value Date    BUN 22 11/12/2024    CREATSERUM 1.27 11/12/2024    ALB 4.0 05/13/2024    TP 7.8 05/13/2024    A1C 9.4 (H) 11/12/2024     Pertinent Imaging/Results (copy/pasted from Care Everywhere--[facility]):    LACIE 1/23/2023  Garry Fournier MD - 01/23/2023   Formatting of this note might be different from the original.   EXAM:   ABIs COMPLETE     HISTORY:   PAD     COMPARISON:   None     TECHNIQUE:   Segmental arterial pressures and Doppler waveforms at three arterial levels in the leg, and toe plethysmography.      FINDINGS:   RIGHT: LACIE 0.61, toe brachial index 0.23, toe pressure 32 mmHg. Brachial pressure 137 mmHg.     Right leg waveforms as follows:   Common femoral: Triphasic   Superficial femoral:  Triphasic   Popliteal:  Triphasic   Posterior tibial:  Monophasic   Dorsalis pedis:  Monophasic     LEFT: LACIE 0.91, toe brachial index was unable to be performed due to amputation. Brachial pressure 130 mmHg.     Left leg waveforms as follows:   Common femoral: Triphasic   Superficial femoral:  Triphasic   Popliteal:  Triphasic   Posterior tibial:  Triphasic   Dorsalis pedis:  Triphasic     IMPRESSION:   1. On the right, there is there is moderate evidence of arterial insufficiency by waveform and LACIE criteria. There is severe pedal arterial insufficiency by TBI criteria (TBI 0.23) and waveform amplitude, suggestive of poor wound healing potential.     2.  On the left, there is mild arterial insufficiency by waveform and TBI criteria. Unable to perform TBI due to amputation..     IR Arteriogram Lower Extremity Unilateral/Bilateral 2/20/23  Impression:  1. Right lower extremity angiogram demonstrates focal moderate popliteal stenosis, moderate stenosis of the proximal to mid peroneal artery and occluded PT occluded reconstituted at the ankle. The AT is occluded with only DP reconstitution.  2. Successful revascularization of the distal popliteal, TPT trunk, Peroneal artery and posterior tibial artery with POBA.  3. Balloon angioplasty of the peroneal artery, tibioperoneal trunk, and posterior tibial artery. Proximal aspects of each tibial vessel were scoring balloon angioplasty.  4. 4mm [place] DCB treatment of the distal pop extending to the proximal PT.      Current Outpatient Medications:     aspirin 81 MG Oral Tab EC, Take 1 tablet (81 mg total) by mouth daily., Disp: 90 tablet, Rfl: 3    clopidogrel 75 MG Oral Tab, Take 1 tablet (75 mg total) by mouth nightly., Disp: 90 tablet, Rfl: 3    empagliflozin (JARDIANCE) 10 MG  Oral Tab, Take 1 tablet (10 mg total) by mouth daily., Disp: 90 tablet, Rfl: 3    glipiZIDE 10 MG Oral Tab, Take 1 tablet (10 mg total) by mouth 2 (two) times daily before meals., Disp: 180 tablet, Rfl: 3    Glucose Blood (ONETOUCH VERIO) In Vitro Strip, Check glucose three times daily E11.65, Disp: 100 strip, Rfl: 1    insulin degludec (TRESIBA FLEXTOUCH) 200 UNIT/ML Subcutaneous Solution Pen-injector, Inject 20 Units into the skin nightly., Disp: 9 mL, Rfl: 3    Insulin Pen Needle (BD PEN NEEDLE HAL 2ND GEN) 32G X 4 MM Does not apply Misc, 1 NEEDLE AT BEDTIME. USE WITH TRESIBA NIGHTLY E11.65, Disp: 100 each, Rfl: 3    metFORMIN HCl 1000 MG Oral Tab, Take 1 tablet (1,000 mg total) by mouth 2 (two) times daily., Disp: 90 tablet, Rfl: 3    OneTouch UltraSoft 2 Lancets Does not apply Misc, 1 Lancet in the morning, at noon, and at bedtime. Check glucose three times daily E11.65, Disp: 100 each, Rfl: 3    simvastatin 20 MG Oral Tab, Take 1 tablet (20 mg total) by mouth daily., Disp: 90 tablet, Rfl: 3    Blood Glucose Monitoring Suppl (ONETOUCH ULTRA 2) w/Device Does not apply Kit, Check glucose three times daily E11.65, Disp: 1 kit, Rfl: 0    Continuous Glucose Sensor (FREESTYLE BRIDGER 2 SENSOR) Does not apply Misc, Inject 1 Device into the skin every 14 (fourteen) days., Disp: , Rfl:     Allergies   Allergen Reactions    Ace Inhibitors UNKNOWN      REVIEW OF SYSTEMS:   Review of Systems   Constitutional:  Negative for activity change, chills and fever.   Respiratory:  Negative for shortness of breath.    Cardiovascular:  Negative for chest pain and leg swelling.   Gastrointestinal:  Negative for abdominal pain, diarrhea and vomiting.   Musculoskeletal:  Positive for arthralgias and gait problem.   Skin:  Positive for wound. Negative for rash.   Neurological:  Negative for dizziness, numbness and headaches.      PHYSICAL EXAM:   /77   Pulse 100   Temp 97.9 °F (36.6 °C) (Oral)   Resp 20   SpO2 98%    Estimated  body mass index is 30.65 kg/m² as calculated from the following:    Height as of 12/17/24: 72\".    Weight as of 12/17/24: 226 lb.   Vital signs reviewed.Appears stated age, well groomed.    Physical Exam  Constitutional:       General: He is not in acute distress.     Appearance: Normal appearance. He is obese. He is not ill-appearing.   HENT:      Head: Normocephalic and atraumatic.   Cardiovascular:      Pulses:           Dorsalis pedis pulses are 2+ on the right side and 1+ on the left side.        Posterior tibial pulses are 2+ on the right side and 1+ on the left side.   Pulmonary:      Effort: Pulmonary effort is normal.   Musculoskeletal:      Right foot: Deformity present.      Left foot: Deformity present.      Right Lower Extremity: Right leg is amputated below ankle. (Right 5th toe)     Left Lower Extremity: Left leg is amputated below ankle. (TMA)  Feet:      Right foot:      Skin integrity: Ulcer, callus and dry skin present. No erythema or warmth.      Toenail Condition: Right toenails are abnormally thick and long.      Left foot:      Skin integrity: Ulcer, callus and dry skin present. No erythema or warmth.      Toenail Condition: Left toenails are abnormally thick and long.      Comments: Right PT and DP with biphasic wave sounds, Left PT with monophasic wave sounds using handheld doppler.   Skin:     General: Skin is warm and dry.      Capillary Refill: Capillary refill takes 2 to 3 seconds.      Findings: Wound (left foot) present.   Neurological:      General: No focal deficit present.      Mental Status: He is alert and oriented to person, place, and time.   Psychiatric:         Mood and Affect: Mood normal.         Behavior: Behavior normal.        Wound 12/12/24 1 Foot Plantar;Left (Active)   Date First Assessed/Time First Assessed: 12/12/24 1101    Wound Number (Wound Clinic Only): 1  Primary Wound Type: Diabetic Ulcer  Location: Foot  Wound Location Orientation: Plantar;Left       Assessments 12/12/2024 11:04 AM 1/8/2025  1:31 PM   Wound Image       Site Assessment Moist;Red;Granulation tissue Moist;Pink;Granulation tissue   Closure Not approximated Not approximated   Drainage Amount Moderate Moderate   Drainage Description Serosanguineous Brown;Tan   Treatments Cleansed;Saline;Wound ;Dakins;Shoe Cleansed;Saline;Wound ;Dakins;Shoe   Shoe Type Felt Felt   Dressing Gauze;Tape Gauze;Tape   Dressing Changed New Changed   Dressing Status Clean;Dry;Intact Clean;Dry;Intact   Wound Length (cm) 2.3 cm 1.9 cm   Wound Width (cm) 2.3 cm 2.4 cm   Wound Surface Area (cm^2) 5.29 cm^2 4.56 cm^2   Wound Depth (cm) 1 cm 0.5 cm   Wound Volume (cm^3) 5.29 cm^3 2.28 cm^3   Wound Healing % -- 57   Margins Well-defined edges;Not attached Well-defined edges;Not attached   Non-staged Wound Description Full thickness Full thickness   Marcy-wound Assessment Dry;Callous Callous   Wound Granulation Tissue Red Pink   Wound Bed Granulation (%) 100 % 100 %   Wound Bed Epithelium (%) 0 % 0 %   Wound Bed Slough (%) 0 % 0 %   Wound Bed Eschar (%) 0 % 0 %   Wound Bed Fibrin (%) 0 % 0 %   State of Healing Undermining;Hyperkeratosis;Non-healing Undermining;Hyperkeratosis;Fully granulated   Wound Odor Strong None   Undermining? Yes Yes   Number of Undermines 1 1   Undermine 1 Start Position 9 9   Undermine 1 End Position 6 1   Nunn Scale -- Grade 2       Active Orders   Date Order Priority Status Authorizing Provider   12/17/24 1650 OP Wound Dressing Routine Active Maiet Goddard APRN     - Cleansing:    Cleanse with Dakins     - Cleansing:    Cleanse with normal saline or wound cleanser     - Dressing:    Wet to dry     - Additional Wound Dressing Information:    All wounds: Zinc oxide to periwound, Dakin's moistened gauze into wound bed, dry gauze, tape. Offloading felt x 2 to left foot.     - Frequency:    Change dressing two times a day       Inactive Orders   Date Order Priority Status Authorizing Provider    01/08/25 1347 Debridement Diabetic Ulcer Plantar;Left Foot Routine Completed Maite Goddard APRN   12/17/24 1445 Debridement Diabetic Ulcer Plantar;Left Foot Routine Completed Maite Goddard APRN   12/12/24 1205 Debridement Diabetic Ulcer Plantar;Left Foot Routine Completed Maite Goddard APRN       Wound 12/12/24 2 Foot Left;Lateral;Plantar (Active)   Date First Assessed/Time First Assessed: 12/12/24 1102    Wound Number (Wound Clinic Only): 2  Primary Wound Type: Diabetic Ulcer  Location: Foot  Wound Location Orientation: Left;Lateral;Plantar      Assessments 12/12/2024 11:04 AM 1/8/2025  1:31 PM   Wound Image       Site Assessment Moist;Red;Granulation tissue Moist;Pink;Dark edges   Closure Not approximated Not approximated   Drainage Amount Moderate Moderate   Drainage Description Serosanguineous Brown;Tan   Treatments Cleansed;Saline;Wound ;Dakins;Shoe Cleansed;Saline;Wound ;Dakins;Shoe   Shoe Type Felt Felt   Dressing Gauze;Tape Gauze;Tape   Dressing Changed New Changed   Dressing Status Clean;Dry;Intact Clean;Dry;Intact   Wound Length (cm) 2.4 cm 1.6 cm   Wound Width (cm) 1.7 cm 1.7 cm   Wound Surface Area (cm^2) 4.08 cm^2 2.72 cm^2   Wound Depth (cm) 0.7 cm 0.4 cm   Wound Volume (cm^3) 2.856 cm^3 1.088 cm^3   Wound Healing % -- 62   Margins Well-defined edges;Not attached Well-defined edges;Not attached   Non-staged Wound Description Full thickness Full thickness   Marcy-wound Assessment Dry;Callous Callous;Hyperpigmented   Wound Granulation Tissue Red Pink   Wound Bed Granulation (%) 100 % 90 %   Wound Bed Epithelium (%) 0 % 0 %   Wound Bed Slough (%) 0 % 10 %   Wound Bed Eschar (%) 0 % 0 %   Wound Bed Fibrin (%) 0 % 0 %   State of Healing Undermining;Hyperkeratosis;Non-healing Undermining;Hyperkeratosis;Non-healing   Wound Odor Strong None   Undermining? Yes Yes   Number of Undermines 1 1   Undermine 1 Start Position 7 7   Undermine 1 End Position 12 12       Active Orders   Date  Order Priority Status Authorizing Provider   12/17/24 1650 OP Wound Dressing Routine Active Maite Goddard APRN     - Cleansing:    Cleanse with Dakins     - Cleansing:    Cleanse with normal saline or wound cleanser     - Dressing:    Wet to dry     - Additional Wound Dressing Information:    All wounds: Zinc oxide to periwound, Dakin's moistened gauze into wound bed, dry gauze, tape. Offloading felt x 2 to left foot.     - Frequency:    Change dressing two times a day       Inactive Orders   Date Order Priority Status Authorizing Provider   01/08/25 1348 Debridement Diabetic Ulcer Left;Lateral;Plantar Foot Routine Completed Maite Goddard APRN   12/17/24 1457 Debridement Diabetic Ulcer Left;Lateral;Plantar Foot Routine Completed Maite Goddard APRN   12/12/24 1207 Debridement Diabetic Ulcer Left;Lateral;Plantar Foot Routine Completed Maite Goddard APRN       Wound 12/12/24 3 Foot Right;Lateral (Active)   Date First Assessed/Time First Assessed: 12/12/24 1122    Wound Number (Wound Clinic Only): 3  Primary Wound Type: Diabetic Ulcer  Location: Foot  Wound Location Orientation: Right;Lateral      Assessments 12/12/2024 11:04 AM 1/8/2025  1:31 PM   Wound Image        Site Assessment Dry;Brown Dry;Brown   Drainage Amount None None   Drainage Description Scabbed Scabbed   Treatments Cleansed;Wound ;Topical (Barrier/Moisturizer/Ointment);Dakins Cleansed;Wound ;Topical (Barrier/Moisturizer/Ointment)   Dressing Gauze;Tape Open to air   Dressing Changed New New   Dressing Status Clean;Dry;Intact Removed   Wound Length (cm) 8.6 cm 0 cm   Wound Width (cm) 5 cm 0 cm   Wound Surface Area (cm^2) 43 cm^2 0 cm^2   Wound Depth (cm) 0.1 cm 0 cm   Wound Volume (cm^3) 4.3 cm^3 0 cm^3   Wound Healing % -- 100   Margins Poorly defined Undefined edges   Non-staged Wound Description Not applicable Full thickness   Marcy-wound Assessment Dry;Callous Dry;Brown;Callous   Wound Bed Granulation (%) 0 % 0 %   Wound  Bed Epithelium (%) 0 % 0 %   Wound Bed Slough (%) 0 % 0 %   Wound Bed Eschar (%) 0 % 0 %   Wound Bed Fibrin (%) 100 % 100 %   State of Healing Non-healing;Hyperkeratosis Non-healing;Hyperkeratosis   Wound Odor Strong None   Shape Cluster of 2 Cluster of 2       Active Orders   Date Order Priority Status Authorizing Provider   12/17/24 1650 OP Wound Dressing Routine Active Maite Goddard APRN     - Cleansing:    Cleanse with Dakins     - Cleansing:    Cleanse with normal saline or wound cleanser     - Dressing:    Wet to dry     - Additional Wound Dressing Information:    All wounds: Zinc oxide to periwound, Dakin's moistened gauze into wound bed, dry gauze, tape. Offloading felt x 2 to left foot.     - Frequency:    Change dressing two times a day       Inactive Orders   Date Order Priority Status Authorizing Provider   12/12/24 1208 Debridement Diabetic Ulcer Right;Lateral Foot Routine Completed Maite Goddard APRN     Procedures  ASSESSMENT AND PLAN:      1. Diabetic ulcer of left midfoot associated with type 2 diabetes mellitus, limited to breakdown of skin ([facility])    2. Diabetic ulcer of right midfoot associated with type 2 diabetes mellitus, with bone involvement without evidence of necrosis ([facility])    3. Peripheral arterial disease with history of revascularization ([facility])    4. Tobacco dependence with current use    5. Uncontrolled type 2 diabetes mellitus with hyperglycemia, with long-term current use of insulin ([facility])    F/u b/l DFUs. He is s/p L TMA 11/28/21 and right 5th ray amputation 10/2022. Wounds have been present for >1 year. He has PAD with hx of revascularizations, most recently on RLE 2/20/23 at [facility]. He has palpaple PT/DP of the right foot with biphasic wave sounds. The left PT and DP with faint monophasic wave sounds. Hindering factors for wound healing include uncontrolled T2DM, tobacco use, PAD and peripheral neuropathy.  Wound on R foot has closed. Wound measurements on the left foot have  improved. Wound beds on left foot are granular, dry. Periwound w/callus. Wound odor rno longer present. There is no erythema, swelling, increased pain or fever or other s/sx of soft tissue infection. Callus and biofilm debrided (see procedure note).    Wound care: Zinc oxide to periwound, Dakin's 1/4 strength wet to moist gauze dressing BID, felt offloading pads.   -Schedule arterial US per vascular recs  -MRI of both feet ordered to evaluate for OM  -Pt has upcoming appt with podiatry, Dr. Holliday 1/20/25  -F/u after podiatry consult    He was advised to continue to monitor for signs and symptoms of infection, encouraged him to assess skin daily, seek immediate care if he develops any s/sx of infection.  Addressed smoking cessation along with diabetes control, discussed impact on wound healing. Would benefit from diabetes education.  Continue nutrition for wound healing -- reduced carbohydrate intake, increased protein intake, and healthy diet. Recommended increased vitamin intake (either with foods or vitamin supplements), need vitamin A, Bs, C, D and zinc for wound healing.   Risks, benefits, and alternatives of current treatment plan discussed in detail.  Questions and concerns addressed. Red flags to RTC or ED reviewed.  Patient agrees to plan.      Return in about 2 weeks (around 1/22/2025) for NURIS x 30 minutesMaite.    I spent 30 minutes with the patient. This time included:  preparing to see the patient (eg, review notes and recent diagnostics), seeing the patient, performing a medically appropriate examination and/or evaluation, counseling and educating the patient, and documenting in the record.    NOTE TO PATIENT: The 21st Century Cures Act makes clinical notes like these available to patients in the interest of transparency. Clinical notes are medical documents used by physicians and care providers to communicate with each other. These documents include medical language and terminology, abbreviations,  and treatment information that may sound technical and at times possibly unfamiliar. In addition, at times, the verbiage may appear blunt or direct. These documents are one tool providers use to communicate relevant information and clinical opinions of the care providers in a way that allows common understanding of the clinical context.

## 2025-01-20 ENCOUNTER — OFFICE VISIT (OUTPATIENT)
Dept: PODIATRY CLINIC | Facility: CLINIC | Age: 60
End: 2025-01-20

## 2025-01-20 DIAGNOSIS — E08.621 DIABETIC ULCER OF TOE OF RIGHT FOOT ASSOCIATED WITH DIABETES MELLITUS DUE TO UNDERLYING CONDITION, LIMITED TO BREAKDOWN OF SKIN (HCC): Primary | ICD-10-CM

## 2025-01-20 DIAGNOSIS — L97.511 DIABETIC ULCER OF TOE OF RIGHT FOOT ASSOCIATED WITH DIABETES MELLITUS DUE TO UNDERLYING CONDITION, LIMITED TO BREAKDOWN OF SKIN (HCC): Primary | ICD-10-CM

## 2025-01-20 RX ORDER — IBUPROFEN 600 MG/1
600 TABLET, FILM COATED ORAL EVERY 8 HOURS PRN
COMMUNITY
Start: 2025-01-15

## 2025-01-20 NOTE — PROGRESS NOTES
Reason for Visit      Yo Villagomez is a 59 year old male presents today complaining of bilateral diabetic foot evaluation.     History of Present Illness     Patient presents to clinic today for routine diabetic foot evaluation.  Patient is a non-insulin-dependent diabetic type II whose last A1c was 9.4 on 11/12/2024.  Patient is being followed closely in the wound clinic most recently seen on 1/8/2025.  Patient is being treated for management of bilateral foot wounds.  Patient has been applying Dakin's dressings twice daily.  Patient is scheduled to get an MRI and arterial studies.  Patient states he is being seen regularly in wound clinic and has appointment later this week.  Patient states that he had transmetatarsal amputation of the left lower extremity over 5 years ago and a partial fifth ray amputation on the right lower extremity.  Patient does have a history of PAD and has had stents done in the past.  Patient was getting all of his care at [facility].    The following portions of the patient's history were reviewed and updated as appropriate: allergies, current medications, past family history, past medical history, past social history, past surgical history and problem list.    Allergies[1]      Current Outpatient Medications:     ibuprofen 600 MG Oral Tab, Take 1 tablet (600 mg total) by mouth every 8 (eight) hours as needed., Disp: , Rfl:     aspirin 81 MG Oral Tab EC, Take 1 tablet (81 mg total) by mouth daily., Disp: 90 tablet, Rfl: 3    clopidogrel 75 MG Oral Tab, Take 1 tablet (75 mg total) by mouth nightly., Disp: 90 tablet, Rfl: 3    empagliflozin (JARDIANCE) 10 MG Oral Tab, Take 1 tablet (10 mg total) by mouth daily., Disp: 90 tablet, Rfl: 3    glipiZIDE 10 MG Oral Tab, Take 1 tablet (10 mg total) by mouth 2 (two) times daily before meals., Disp: 180 tablet, Rfl: 3    Glucose Blood (ONETOUCH VERIO) In Vitro Strip, Check glucose three times daily E11.65, Disp: 100 strip, Rfl: 1    insulin degludec  (TRESIBA FLEXTOUCH) 200 UNIT/ML Subcutaneous Solution Pen-injector, Inject 20 Units into the skin nightly., Disp: 9 mL, Rfl: 3    Insulin Pen Needle (BD PEN NEEDLE HAL 2ND GEN) 32G X 4 MM Does not apply Misc, 1 NEEDLE AT BEDTIME. USE WITH TRESIBA NIGHTLY E11.65, Disp: 100 each, Rfl: 3    metFORMIN HCl 1000 MG Oral Tab, Take 1 tablet (1,000 mg total) by mouth 2 (two) times daily., Disp: 90 tablet, Rfl: 3    OneTouch UltraSoft 2 Lancets Does not apply Misc, 1 Lancet in the morning, at noon, and at bedtime. Check glucose three times daily E11.65, Disp: 100 each, Rfl: 3    simvastatin 20 MG Oral Tab, Take 1 tablet (20 mg total) by mouth daily., Disp: 90 tablet, Rfl: 3    Blood Glucose Monitoring Suppl (ONETOUCH ULTRA 2) w/Device Does not apply Kit, Check glucose three times daily E11.65, Disp: 1 kit, Rfl: 0    Continuous Glucose Sensor (FREESTYLE BRIDGER 2 SENSOR) Does not apply Misc, Inject 1 Device into the skin every 14 (fourteen) days., Disp: , Rfl:     There are no discontinued medications.    Patient Active Problem List   Diagnosis    Type 2 diabetes mellitus with hyperglycemia, without long-term current use of insulin (HCC)    Diabetic neuropathy (HCC)    Dyslipidemia    Hypertension associated with type 2 diabetes mellitus (HCC)    PAD (peripheral artery disease) (HCC)    Diabetic ulcer of left foot associated with type 2 diabetes mellitus, with muscle involvement without evidence of necrosis (HCC)    Hx of amputation of lesser toe, left (HCC)    Hx of amputation of lesser toe, right (HCC)    Stage 3 chronic kidney disease due to diabetes mellitus (HCC)    Microalbuminuria due to type 2 diabetes mellitus (HCC)    Stage 3a chronic kidney disease (CKD) (HCC)       Past Medical History:    Diabetes (HCC)    Diabetic neuropathy (HCC)    Dyslipidemia    ED (erectile dysfunction)    Erectile dysfunction    Hypertension    Necrotizing soft tissue infection    Obesity    Other diabetic neurological complication  associated with type 2 diabetes mellitus (HCC)    PAD (peripheral artery disease) (HCC)    Sepsis (HCC)    Smoker       No past surgical history on file.    Family History   Problem Relation Age of Onset    Diabetes Mother     Hyperlipidemia Mother     Diabetes Brother        Social History     Occupational History    Not on file   Tobacco Use    Smoking status: Every Day     Current packs/day: 1.00     Average packs/day: 1 pack/day for 16.1 years (16.1 ttl pk-yrs)     Types: Cigarettes     Start date: 2009    Smokeless tobacco: Never   Vaping Use    Vaping status: Never Used   Substance and Sexual Activity    Alcohol use: Yes     Alcohol/week: 1.0 standard drink of alcohol     Types: 1 Standard drinks or equivalent per week    Drug use: Never    Sexual activity: Not on file       ROS      Constitutional: negative for chills, fevers and sweats  Gastrointestinal: negative for abdominal pain, diarrhea, nausea and vomiting  Genitourinary:negative for dysuria and hematuria  Musculoskeletal:negative for arthralgias and muscle weakness  Neurological: negative for paresthesia and weakness  All others reviewed and negative.      Physical Exam     LE PHYSICAL EXAM    Constitution: Well-developed and well-nourished. Gait appears normal. No apparent distress. Alert and oriented to person, place, and time.  Integument: There are no varicosities.   Vascular examination: Dorsalis pedis and posterior tibial pulses are weakly palpable    Right lower extremity:  Status post partial fifth ray amputation with significant amount of hyperkeratotic tissue on the lateral aspect of the right foot.  Status post debridement a small ulceration subfourth metatarsal head approximately 3 mm x 4 mm    Left lower extremity:  2 ulcerations noted plantar aspect of TMA site, subfirst metatarsal head and subfifth metatarsal head.  No fluctuance drainage or ascending cellulitis.  Ulcerations noted to be 5.2 x 6.4 on the right subfirst metatarsal head  and 4.5 cm x 3.8 cm subfifth metatarsal head.  Significant hyperkeratotic periphery.        Procedure       Follow LMX anesthesia, utilizing #15 blade sharp selective debridement was performed to wound on patient's left hallux to remove fibrous and devitalized tissue without incident. Post debridement measurements unchanged.  A total of 6 sq cm were debrided. Pt tolerated procedure well. Applied iodine and DSD.       Assessment and Plan     Encounter Diagnoses   Name Primary?    Diabetic ulcer of toe of right foot associated with diabetes mellitus due to underlying condition, limited to breakdown of skin (HCC) Yes   Had a lengthy discussion with patient regards to his clinical presentation and treatment moving forward.  Discussed there are 3 reasons why wounds do not heal.  Infection pressure and lack of blood flow.  Patient is scheduled to have an MRI done of his left lower extremity later this week which will rule out infection.  Patient scheduled to have arterial studies and sees vascular surgery will address the blood flow issues.  Discussed that it is definitely a pressure aspect given the fact that he has regular shoes on.  Prescription given for custom diabetic shoes and inserts.  Patient encouraged to reach out to us once he has the MRI and arterial studies done as I will not get notifications since I do not place the order.  Discussed signs of infection though I do not see any at this time.  Based on MRI and arterial studies patient more than likely need some sort of vascular intervention.    Patient was instructed to call the office or on-call podiatric physician immediately with any issues or concerns before the next scheduled visit. Patient to follow-up in clinic in after MRI and arterial study      Twyla Bashir DPM, [email or web address] [email or web address]  Diplomat, American Board of Foot and Ankle Surgery  Certified in Foot and Rearfoot/Ankle Reconstruction  Fellow of the American College of Foot and Ankle  Surgeons  Fellowship Trained Foot and Ankle Surgeon   [facility]     1/20/2025    12:14 PM         [1]   Allergies  Allergen Reactions    Ace Inhibitors UNKNOWN

## 2025-01-22 ENCOUNTER — OFFICE VISIT (OUTPATIENT)
Dept: WOUND CARE | Facility: HOSPITAL | Age: 60
End: 2025-01-22
Attending: NURSE PRACTITIONER
Payer: MEDICARE

## 2025-01-22 VITALS
RESPIRATION RATE: 18 BRPM | DIASTOLIC BLOOD PRESSURE: 78 MMHG | OXYGEN SATURATION: 97 % | TEMPERATURE: 98 F | HEART RATE: 99 BPM | SYSTOLIC BLOOD PRESSURE: 139 MMHG

## 2025-01-22 DIAGNOSIS — F17.200 TOBACCO DEPENDENCE WITH CURRENT USE: ICD-10-CM

## 2025-01-22 DIAGNOSIS — E11.65 UNCONTROLLED TYPE 2 DIABETES MELLITUS WITH HYPERGLYCEMIA, WITH LONG-TERM CURRENT USE OF INSULIN (HCC): ICD-10-CM

## 2025-01-22 DIAGNOSIS — I73.9 PERIPHERAL ARTERIAL DISEASE WITH HISTORY OF REVASCULARIZATION: ICD-10-CM

## 2025-01-22 DIAGNOSIS — E11.621 DIABETIC ULCER OF LEFT MIDFOOT ASSOCIATED WITH TYPE 2 DIABETES MELLITUS, LIMITED TO BREAKDOWN OF SKIN (HCC): Primary | ICD-10-CM

## 2025-01-22 DIAGNOSIS — L97.421 DIABETIC ULCER OF LEFT MIDFOOT ASSOCIATED WITH TYPE 2 DIABETES MELLITUS, LIMITED TO BREAKDOWN OF SKIN (HCC): Primary | ICD-10-CM

## 2025-01-22 DIAGNOSIS — L97.416 DIABETIC ULCER OF RIGHT MIDFOOT ASSOCIATED WITH TYPE 2 DIABETES MELLITUS, WITH BONE INVOLVEMENT WITHOUT EVIDENCE OF NECROSIS (HCC): ICD-10-CM

## 2025-01-22 DIAGNOSIS — Z98.890 PERIPHERAL ARTERIAL DISEASE WITH HISTORY OF REVASCULARIZATION: ICD-10-CM

## 2025-01-22 DIAGNOSIS — E11.621 DIABETIC ULCER OF RIGHT MIDFOOT ASSOCIATED WITH TYPE 2 DIABETES MELLITUS, WITH BONE INVOLVEMENT WITHOUT EVIDENCE OF NECROSIS (HCC): ICD-10-CM

## 2025-01-22 DIAGNOSIS — Z79.4 UNCONTROLLED TYPE 2 DIABETES MELLITUS WITH HYPERGLYCEMIA, WITH LONG-TERM CURRENT USE OF INSULIN (HCC): ICD-10-CM

## 2025-01-22 PROCEDURE — 97597 DBRDMT OPN WND 1ST 20 CM/<: CPT | Performed by: NURSE PRACTITIONER

## 2025-01-22 NOTE — PROGRESS NOTES
Chief Complaint   Patient presents with    Wound Recheck     Patient is here for bilateral foot wounds. Pt states Bs were at 171 yesterday.     HPI:   1/22/25: F/u b/l foot wounds. Saw Dr. Holliday 1/20/25. Wounds were debrided. Pt has arterial US scheduled tomorrow and MRI 2/16/25.     1/8/25: F/u b/l foot wounds. No drainage from the right foot. Has been applying Dakins dressing BID since last visit. Hasn't scheduled vascular testing or MRI yet. Says he is feeling well, denies change in health since last visit.    12/17/24: F/u visit for b/l foot wounds. Saw Dr. Najjar prior to this appointment. Referred for b/l arterial duplex and carotid dopplers. Pt also has upcoming appointment with Dr. Holliday on 1/20/25. Pt reports he started using the Dakin's dressing a few days ago.     12/12/24: Pt returns to wound care, was lost to follow up over past 6 months. Says he has been cleaning the wounds and keeping them covered. Wants to get back on track with health. He was recently started on insulin by his PCP. He doesn't check his BG regularly. Does his best to minimize weight bearing. Has appt to see vascular surgery next week. PCP has also referred to podiatry, no appt yet. Says he still smokes cigarettes, trying to cut back. Drinks wine once a week and denies recreational drug use at this time.     6/11/24: F/u left DFU. Saw his PCP last week, says he started on insulin. Received podiatry referral as well, has not scheduled appointment. Did not receive vascular surgery referral. Reports he has been doing well with dressing changes BID for the past week. Reports the swelling in his leg occurs if he is up and about for 3 hours, resolves when he sits and elevates legs. He denies fever, chill, pain. Says Denies new concerns.    6/5/24: F/u left DFU. Had difficulty filling Rx for Dakin's. Says he picked up at a specialty pharmacy yesterday, 1/2 strength instead of 1/4 strength. Reports he has been doing well, denies change in  health since last visit. Has a follow up visit with his PCP tomorrow, will request updated referrals for [facility] and vascular surgery.     5/23/24: F/u visit for left foot wounds. Didn't  Dakin's solution after last visit and hasn't received gauze and tape from [employer], therefore, says he has continued to apply betadine and gauze to wounds. Denies change in health since last visit, no new concerns.    5/16/24: 59 yr old male here today for chronic wound on left foot. His current medical problems include uncontrolled T2DM (A1c 15.5% on 5/13/24), diabetic neuropathy, tobacco use (1/2 PPD), HLD, PAD. 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 He underwent RLE angiogram with intervention 2/20/23. He reports he had been seeing a podiatrist regularly until his insurance changed this year. He noticed wounds developed on his left foot about 4 months ago. He  has been applying betadine and covering with gauze almost every day. He denies drainage from the right foot and has noticed the skin changes over the past year as well. He has chronic intermittent pain in both feet for which he reports he previously received [place] from his podiatrist. He used to wear a boot to offload wounds, no longer has it. He tries to walk on his heel as much as possible. PCP ordered a cane, but is having a hard time getting from [employer] company. He lives alone, says he stays with his mother often. He has cut back on tobacco use from 1 PPD to 1/2 PPD. He denies recent alcohol use and denies recent illicit substance use.     Lab Results   Component Value Date    BUN 22 11/12/2024    CREATSERUM 1.27 11/12/2024    ALB 4.0 05/13/2024    TP 7.8 05/13/2024    A1C 9.4 (H) 11/12/2024     Pertinent Imaging/Results (copy/pasted from [facility]where[facility], later [facility]):    LACIE 1/23/2023  Garry Fournier MD - 01/23/2023   Formatting of this note might be different from the original.   EXAM:   ABIs COMPLETE      HISTORY:   PAD     COMPARISON:   None     TECHNIQUE:   Segmental arterial pressures and Doppler waveforms at three arterial levels in the leg, and toe plethysmography.     FINDINGS:   RIGHT: LACIE 0.61, toe brachial index 0.23, toe pressure 32 mmHg. Brachial pressure 137 mmHg.     Right leg waveforms as follows:   Common femoral: Triphasic   Superficial femoral:  Triphasic   Popliteal:  Triphasic   Posterior tibial:  Monophasic   Dorsalis pedis:  Monophasic     LEFT: LAICE 0.91, toe brachial index was unable to be performed due to amputation. Brachial pressure 130 mmHg.     Left leg waveforms as follows:   Common femoral: Triphasic   Superficial femoral:  Triphasic   Popliteal:  Triphasic   Posterior tibial:  Triphasic   Dorsalis pedis:  Triphasic     IMPRESSION:   1. On the right, there is there is moderate evidence of arterial insufficiency by waveform and LACIE criteria. There is severe pedal arterial insufficiency by TBI criteria (TBI 0.23) and waveform amplitude, suggestive of poor wound healing potential.     2.  On the left, there is mild arterial insufficiency by waveform and TBI criteria. Unable to perform TBI due to amputation..     IR Arteriogram Lower Extremity Unilateral/Bilateral 2/20/23  Impression:  1. Right lower extremity angiogram demonstrates focal moderate popliteal stenosis, moderate stenosis of the proximal to mid peroneal artery and occluded PT occluded reconstituted at the ankle. The AT is occluded with only DP reconstitution.  2. Successful revascularization of the distal popliteal, TPT trunk, Peroneal artery and posterior tibial artery with POBA.  3. Balloon angioplasty of the peroneal artery, tibioperoneal trunk, and posterior tibial artery. Proximal aspects of each tibial vessel were scoring balloon angioplasty.  4. 4mm [place] DCB treatment of the distal pop extending to the proximal PT.      Current Outpatient Medications:     ibuprofen 600 MG Oral Tab, Take 1 tablet (600 mg total) by  mouth every 8 (eight) hours as needed., Disp: , Rfl:     aspirin 81 MG Oral Tab EC, Take 1 tablet (81 mg total) by mouth daily., Disp: 90 tablet, Rfl: 3    clopidogrel 75 MG Oral Tab, Take 1 tablet (75 mg total) by mouth nightly., Disp: 90 tablet, Rfl: 3    empagliflozin (JARDIANCE) 10 MG Oral Tab, Take 1 tablet (10 mg total) by mouth daily., Disp: 90 tablet, Rfl: 3    glipiZIDE 10 MG Oral Tab, Take 1 tablet (10 mg total) by mouth 2 (two) times daily before meals., Disp: 180 tablet, Rfl: 3    Glucose Blood (ONETOUCH VERIO) In Vitro Strip, Check glucose three times daily E11.65, Disp: 100 strip, Rfl: 1    insulin degludec (TRESIBA FLEXTOUCH) 200 UNIT/ML Subcutaneous Solution Pen-injector, Inject 20 Units into the skin nightly., Disp: 9 mL, Rfl: 3    Insulin Pen Needle (BD PEN NEEDLE HAL 2ND GEN) 32G X 4 MM Does not apply Misc, 1 NEEDLE AT BEDTIME. USE WITH TRESIBA NIGHTLY E11.65, Disp: 100 each, Rfl: 3    metFORMIN HCl 1000 MG Oral Tab, Take 1 tablet (1,000 mg total) by mouth 2 (two) times daily., Disp: 90 tablet, Rfl: 3    OneTouch UltraSoft 2 Lancets Does not apply Misc, 1 Lancet in the morning, at noon, and at bedtime. Check glucose three times daily E11.65, Disp: 100 each, Rfl: 3    simvastatin 20 MG Oral Tab, Take 1 tablet (20 mg total) by mouth daily., Disp: 90 tablet, Rfl: 3    Blood Glucose Monitoring Suppl (ONETOUCH ULTRA 2) w/Device Does not apply Kit, Check glucose three times daily E11.65, Disp: 1 kit, Rfl: 0    Continuous Glucose Sensor (FREESTYLE BRIDGER 2 SENSOR) Does not apply Misc, Inject 1 Device into the skin every 14 (fourteen) days., Disp: , Rfl:     Allergies   Allergen Reactions    Ace Inhibitors UNKNOWN      REVIEW OF SYSTEMS:   Review of Systems   Constitutional:  Negative for activity change, chills and fever.   Respiratory:  Negative for shortness of breath.    Cardiovascular:  Negative for chest pain and leg swelling.   Gastrointestinal:  Negative for abdominal pain, diarrhea and vomiting.    Musculoskeletal:  Positive for arthralgias and gait problem.   Skin:  Positive for wound. Negative for rash.   Neurological:  Negative for dizziness, numbness and headaches.      PHYSICAL EXAM:   /78   Pulse 99   Temp 97.7 °F (36.5 °C)   Resp 18   SpO2 97%    Estimated body mass index is 30.65 kg/m² as calculated from the following:    Height as of 12/17/24: 72\".    Weight as of 12/17/24: 226 lb.   Vital signs reviewed.Appears stated age, well groomed.    Physical Exam  Constitutional:       General: He is not in acute distress.     Appearance: Normal appearance. He is obese. He is not ill-appearing.   HENT:      Head: Normocephalic and atraumatic.   Cardiovascular:      Pulses:           Dorsalis pedis pulses are 2+ on the right side and 1+ on the left side.        Posterior tibial pulses are 2+ on the right side and 1+ on the left side.   Pulmonary:      Effort: Pulmonary effort is normal.   Musculoskeletal:      Right foot: Deformity present.      Left foot: Deformity present.      Right Lower Extremity: Right leg is amputated below ankle. (Right 5th toe)     Left Lower Extremity: Left leg is amputated below ankle. (TMA)  Feet:      Right foot:      Skin integrity: Ulcer, callus and dry skin present. No erythema or warmth.      Toenail Condition: Right toenails are abnormally thick and long.      Left foot:      Skin integrity: Ulcer, callus and dry skin present. No erythema or warmth.      Toenail Condition: Left toenails are abnormally thick and long.      Comments: Right PT and DP with biphasic wave sounds, Left PT with monophasic wave sounds using handheld doppler.   Skin:     General: Skin is warm and dry.      Capillary Refill: Capillary refill takes 2 to 3 seconds.      Findings: Wound (left foot) present.   Neurological:      General: No focal deficit present.      Mental Status: He is alert and oriented to person, place, and time.   Psychiatric:         Mood and Affect: Mood normal.          Behavior: Behavior normal.        Wound 12/12/24 1 Foot Plantar;Left (Active)   Date First Assessed/Time First Assessed: 12/12/24 1101    Wound Number (Wound Clinic Only): 1  Primary Wound Type: Diabetic Ulcer  Location: Foot  Wound Location Orientation: Plantar;Left      Assessments 12/12/2024 11:04 AM 1/22/2025  1:12 PM   Wound Image       Site Assessment Moist;Red;Granulation tissue Moist;Pink;Red   Closure Not approximated Not approximated   Drainage Amount Moderate Moderate   Drainage Description Serosanguineous Serosanguineous   Treatments Cleansed;Saline;Wound ;Dakins;Shoe Cleansed;Saline;Wound ;Dakins   Shoe Type Felt Felt   Dressing Gauze;Tape Tape   Dressing Changed New Changed   Dressing Status Clean;Dry;Intact Clean;Dry;Intact;Dressing Changed   Wound Length (cm) 2.3 cm 1.7 cm   Wound Width (cm) 2.3 cm 2.3 cm   Wound Surface Area (cm^2) 5.29 cm^2 3.91 cm^2   Wound Depth (cm) 1 cm 0.6 cm   Wound Volume (cm^3) 5.29 cm^3 2.346 cm^3   Wound Healing % -- 56   Margins Well-defined edges;Not attached Well-defined edges;Not attached   Non-staged Wound Description Full thickness Full thickness   Marcy-wound Assessment Dry;Callous Callous   Wound Granulation Tissue Red Red;Pink   Wound Bed Granulation (%) 100 % 100 %   Wound Bed Epithelium (%) 0 % 0 %   Wound Bed Slough (%) 0 % 0 %   Wound Bed Eschar (%) 0 % 0 %   Wound Bed Fibrin (%) 0 % 0 %   State of Healing Undermining;Hyperkeratosis;Non-healing Undermining;Hyperkeratosis;Fully granulated   Wound Odor Strong None   Undermining? Yes Yes   Number of Undermines 1 1   Undermine 1 Start Position 9 10   Undermine 1 End Position 6 1   Nunn Scale -- Grade 2       Active Orders   Date Order Priority Status Authorizing Provider   01/22/25 1626 OP Wound Dressing Routine Active Maite Goddard APRN     - Cleansing:    Cleanse with normal saline or wound cleanser     - Dressing:    Dry gauze     - Dressing:    Paper tape     - Additional Wound Dressing  Information:    1/4 St Dakins moist to wet dressing     - Frequency:    Change dressing two times a day   12/17/24 1650 OP Wound Dressing Routine Active Maite Goddard APRN     - Cleansing:    Cleanse with Dakins     - Cleansing:    Cleanse with normal saline or wound cleanser     - Dressing:    Wet to dry     - Additional Wound Dressing Information:    All wounds: Zinc oxide to periwound, Dakin's moistened gauze into wound bed, dry gauze, tape. Offloading felt x 2 to left foot.     - Frequency:    Change dressing two times a day       Inactive Orders   Date Order Priority Status Authorizing Provider   01/08/25 1347 Debridement Diabetic Ulcer Plantar;Left Foot Routine Completed Maite Goddard APRN   12/17/24 1445 Debridement Diabetic Ulcer Plantar;Left Foot Routine Completed Maite Goddard APRN   12/12/24 1205 Debridement Diabetic Ulcer Plantar;Left Foot Routine Completed Maite Goddard APRN       Wound 12/12/24 2 Foot Left;Lateral;Plantar (Active)   Date First Assessed/Time First Assessed: 12/12/24 1102    Wound Number (Wound Clinic Only): 2  Primary Wound Type: Diabetic Ulcer  Location: Foot  Wound Location Orientation: Left;Lateral;Plantar      Assessments 12/12/2024 11:04 AM 1/22/2025  1:12 PM   Wound Image       Site Assessment Moist;Red;Granulation tissue Fragile;Moist;Pink;Red   Closure Not approximated Not approximated   Drainage Amount Moderate Moderate   Drainage Description Serosanguineous Serosanguineous   Treatments Cleansed;Saline;Wound ;Dakins;Shoe Cleansed;Saline;Wound    Shoe Type Felt Felt   Dressing Gauze;Tape Gauze;Tape   Dressing Changed New Changed   Dressing Status Clean;Dry;Intact Clean;Dry;Intact;Dressing Changed   Wound Length (cm) 2.4 cm 1.8 cm   Wound Width (cm) 1.7 cm 1.7 cm   Wound Surface Area (cm^2) 4.08 cm^2 3.06 cm^2   Wound Depth (cm) 0.7 cm 0.3 cm   Wound Volume (cm^3) 2.856 cm^3 0.918 cm^3   Wound Healing % -- 68   Margins Well-defined edges;Not attached  Well-defined edges;Not attached   Non-staged Wound Description Full thickness Full thickness   Marcy-wound Assessment Dry;Callous Callous;Hyperpigmented   Wound Granulation Tissue Red Pink;Red   Wound Bed Granulation (%) 100 % 100 %   Wound Bed Epithelium (%) 0 % 0 %   Wound Bed Slough (%) 0 % 0 %   Wound Bed Eschar (%) 0 % 0 %   Wound Bed Fibrin (%) 0 % 0 %   State of Healing Undermining;Hyperkeratosis;Non-healing Undermining;Hyperkeratosis;Non-healing   Wound Odor Strong None   Tunneling? -- No   Undermining? Yes Yes   Number of Undermines 1 1   Undermine 1 Start Position 7 10   Undermine 1 End Position 12 12   Sinus Tracts? -- No   Nunn Scale -- Grade 2       Active Orders   Date Order Priority Status Authorizing Provider   01/22/25 1626 OP Wound Dressing Routine Active Maite Goddard APRN     - Cleansing:    Cleanse with normal saline or wound cleanser     - Dressing:    Dry gauze     - Dressing:    Paper tape     - Additional Wound Dressing Information:    1/4 St Dakins moist to wet dressing     - Frequency:    Change dressing two times a day   12/17/24 1650 OP Wound Dressing Routine Active Maite Goddard APRN     - Cleansing:    Cleanse with Dakins     - Cleansing:    Cleanse with normal saline or wound cleanser     - Dressing:    Wet to dry     - Additional Wound Dressing Information:    All wounds: Zinc oxide to periwound, Dakin's moistened gauze into wound bed, dry gauze, tape. Offloading felt x 2 to left foot.     - Frequency:    Change dressing two times a day       Inactive Orders   Date Order Priority Status Authorizing Provider   01/08/25 1348 Debridement Diabetic Ulcer Left;Lateral;Plantar Foot Routine Completed Maite Goddard APRN   12/17/24 1457 Debridement Diabetic Ulcer Left;Lateral;Plantar Foot Routine Completed Maite Goddard APRN   12/12/24 1207 Debridement Diabetic Ulcer Left;Lateral;Plantar Foot Routine Completed Maite Goddard APRN       Wound 12/12/24 3 Foot Right;Lateral  (Active)   Date First Assessed/Time First Assessed: 12/12/24 1122    Wound Number (Wound Clinic Only): 3  Primary Wound Type: Diabetic Ulcer  Location: Foot  Wound Location Orientation: Right;Lateral      Assessments 12/12/2024 11:04 AM 1/22/2025  1:12 PM   Wound Image       Site Assessment Dry;Brown Dry;Fragile;Pink;Granulation tissue;Red   Drainage Amount None Scant   Drainage Description Scabbed --   Treatments Cleansed;Wound ;Topical (Barrier/Moisturizer/Ointment);Dakins Cleansed;Saline   Dressing Gauze;Tape 2x2s;Gauze;Tape   Dressing Changed New Changed   Dressing Status Clean;Dry;Intact Clean;Dry;Intact;Dressing Changed   Wound Length (cm) 8.6 cm 0.1 cm   Wound Width (cm) 5 cm 0.1 cm   Wound Surface Area (cm^2) 43 cm^2 0.01 cm^2   Wound Depth (cm) 0.1 cm 0.1 cm   Wound Volume (cm^3) 4.3 cm^3 0.001 cm^3   Wound Healing % -- 100   Margins Poorly defined Poorly defined   Non-staged Wound Description Not applicable Full thickness   Marcy-wound Assessment Dry;Callous Dry;Callous   Wound Granulation Tissue -- Red;Pink   Wound Bed Granulation (%) 0 % 100 %   Wound Bed Epithelium (%) 0 % --   Wound Bed Slough (%) 0 % 0 %   Wound Bed Eschar (%) 0 % 0 %   Wound Bed Fibrin (%) 100 % 0 %   State of Healing Non-healing;Hyperkeratosis Early/partial granulation   Wound Odor Strong None   Shape Cluster of 2 --       Active Orders   Date Order Priority Status Authorizing Provider   01/22/25 1626 OP Wound Dressing Routine Active Maite Goddard APRN     - Cleansing:    Cleanse with normal saline or wound cleanser     - Dressing:    Dry gauze     - Dressing:    Paper tape     - Additional Wound Dressing Information:    1/4 St Dakins moist to wet dressing     - Frequency:    Change dressing two times a day   12/17/24 1650 OP Wound Dressing Routine Active Maite Goddard APRN     - Cleansing:    Cleanse with Dakins     - Cleansing:    Cleanse with normal saline or wound cleanser     - Dressing:    Wet to dry     -  Additional Wound Dressing Information:    All wounds: Zinc oxide to periwound, Dakin's moistened gauze into wound bed, dry gauze, tape. Offloading felt x 2 to left foot.     - Frequency:    Change dressing two times a day       Inactive Orders   Date Order Priority Status Authorizing Provider   12/12/24 1208 Debridement Diabetic Ulcer Right;Lateral Foot Routine Completed Maite Goddard APRN     Debridement Diabetic Ulcer Right;Lateral Foot   Wound 12/12/24 3 Foot Right;Lateral    Performed by: Maite Goddard APRN  Authorized by: Maite Goddard APRN      Consent   Consent obtained? verbal  Consent given by: patient  Risks discussed? procedural risks discussed  Immediately prior to the procedure a time out was called and the performing provider verified the correct patient, procedure, equipment, support staff, and site/side marked as required.    Debridement Details  Performed by: NP  Debridement type: conservative sharp  Pain control: none    Pre-debridement measurements  Length (cm): 0.1  Width (cm): 0.1  Depth (cm): 0.1  Surface Area (cm^2): 0.01    Post-debridement measurements  Length (cm): 0.2  Width (cm): 0.1  Depth (cm): 0.1  Percent debrided: 100%  Surface Area (cm^2): 0.02  Area Debrided (cm^2): 0.02  Volume (cm^3): 0    Devitalized tissue debrided: callus and fibrin  Instrument(s) utilized: blade  Bleeding: none  Hemostasis obtained with: not applicable  Procedural pain (0-10): insensate  Post-procedural pain: insensate   Response to treatment: procedure was tolerated well      ASSESSMENT AND PLAN:      1. Diabetic ulcer of left midfoot associated with type 2 diabetes mellitus, limited to breakdown of skin (HCC)  - OP Wound Dressing; Standing    2. Diabetic ulcer of right midfoot associated with type 2 diabetes mellitus, with bone involvement without evidence of necrosis ([facility])  - OP Wound Dressing; Standing    3. Peripheral arterial disease with history of revascularization ([facility])    4. Tobacco  dependence with current use    5. Uncontrolled type 2 diabetes mellitus with hyperglycemia, with long-term current use of insulin (HCC)    F/u b/l DFUs. He is s/p L TMA 11/28/21 and right 5th ray amputation 10/2022. Wounds have been present for >1 year. He has PAD with hx of revascularizations, most recently on RLE 2/20/23 at [facility]. He has palpaple PT/DP of the right foot with biphasic wave sounds. The left PT and DP with faint monophasic wave sounds. Hindering factors for wound healing include uncontrolled T2DM, tobacco use, PAD and peripheral neuropathy.  Wounds were debrided by podiatry, Dr. Holliday, 1/20/25. There is a small open wound on the right foot with thick periwound callus. Callus was selectively debrided for better visualization of the wound bed. Wound bed is small, granular.   Wound measurements on left foot similar to previous measurements. Wound beds are granular, slightly dry. Periwound w/callus. There is no erythema, swelling, increased pain or fever or other s/sx of soft tissue infection.     Wound care: Dakin's 1/4 strength wet to moist gauze dressing BID, felt offloading pads.   -arterial US per vascular recs scheduled tomorrow, 1/23/25  -MRI of both feet scheduled 2/16/25  -Saw Dr. Holliday 1/20/25    He was advised to continue to monitor for signs and symptoms of infection, encouraged him to assess skin daily, seek immediate care if he develops any s/sx of infection.  Continue to work on complete smoking cessation along with diabetes control, discussed impact on wound healing. Would benefit from diabetes education.  Continue nutrition for wound healing -- reduced carbohydrate intake, increased protein intake, and healthy diet. Recommended increased vitamin intake (either with foods or vitamin supplements), need vitamin A, Bs, C, D and zinc for wound healing.   Risks, benefits, and alternatives of current treatment plan discussed in detail.  Questions and concerns addressed. Red flags to RTC or ED  reviewed.  Patient agrees to plan.      Return in about 2 weeks (around 2/5/2025) for APN x30.    I spent 30 minutes with the patient. This time included:  preparing to see the patient (eg, review notes and recent diagnostics), seeing the patient, performing a medically appropriate examination and/or evaluation, counseling and educating the patient, and documenting in the record.    NOTE TO PATIENT: The 21st Century Cures Act makes clinical notes like these available to patients in the interest of transparency. Clinical notes are medical documents used by physicians and care providers to communicate with each other. These documents include medical language and terminology, abbreviations, and treatment information that may sound technical and at times possibly unfamiliar. In addition, at times, the verbiage may appear blunt or direct. These documents are one tool providers use to communicate relevant information and clinical opinions of the care providers in a way that allows common understanding of the clinical context.

## 2025-01-23 ENCOUNTER — OFFICE VISIT (OUTPATIENT)
Facility: CLINIC | Age: 60
End: 2025-01-23

## 2025-01-23 ENCOUNTER — NURSE ONLY (OUTPATIENT)
Facility: CLINIC | Age: 60
End: 2025-01-23

## 2025-01-23 VITALS — WEIGHT: 220 LBS | BODY MASS INDEX: 29.8 KG/M2 | HEIGHT: 72 IN

## 2025-01-23 DIAGNOSIS — I70.244 ATHEROSCLEROSIS OF NATIVE ARTERY OF LEFT LOWER EXTREMITY WITH ULCERATION OF MIDFOOT (HCC): Primary | ICD-10-CM

## 2025-01-23 DIAGNOSIS — I77.9 CAROTID ARTERY DISEASE, UNSPECIFIED LATERALITY, UNSPECIFIED TYPE: ICD-10-CM

## 2025-01-23 DIAGNOSIS — I70.244 ATHEROSCLEROSIS OF NATIVE ARTERY OF LEFT LOWER EXTREMITY WITH ULCERATION OF MIDFOOT (HCC): ICD-10-CM

## 2025-01-24 NOTE — PROGRESS NOTES
Samer F. Najjar, MD  Vascular Surgery  [facility]       VASCULAR SURGERY OFFICE NOTE        Name: Yo Villagomez   : 3/27/1965  [id number]     REASON FOR VISIT:   Patient is a 59 year old male who is here to discuss the results of his arterial duplex. This was done to assess perfusion to his left lower extremity.    He has a history of diabetes and peripheral arterial disease and has previously undergone right lower extremity revascularization at [facility] with angioplasty of his tibial vessels after which he underwent right 5th ray amputation (10/2022).  Prior to that he underwent a left TMA (2021).  He states that the TMA actually healed but developed two plantar ulcers and he has been seen initially at the [facility] wound care center and now he is being seen at the [facility] as his primary care doctor is at [place] .  He states that he would like to concentrate his physicians with the [place] system.  He was a heavy smoker but now he has been trying to cut down to half a pack.  He has been noticing that his left lower extremity has moderate lymphedema and he has not worn compression stockings.    Duplex imaging demonstrates a high-grade stenosis (> 75%, ratio: 4.7) of the tibioperoneal trunk artery. Duplex imaging demostrates a hemodyncamically significant stenosis (>50%) of the deep femoral artery (ratio: 3.1) and distal popliteal artery (ratio: 3.7). The mid & distal anterior tibial artery are occluded  PAST MEDICAL HISTORY:     Past Medical History:    Diabetes (HCC)    Diabetic neuropathy ([facility])    Dyslipidemia    ED (erectile dysfunction)    Erectile dysfunction    Hypertension    Necrotizing soft tissue infection    Obesity    Other diabetic neurological complication associated with type 2 diabetes mellitus (HCC)    PAD (peripheral artery disease) (HCC)    Sepsis (HCC)    Smoker       PAST SURGICAL HISTORY:   No past surgical history on file.     MEDICATIONS:     Current  Outpatient Medications:     ibuprofen 600 MG Oral Tab, Take 1 tablet (600 mg total) by mouth every 8 (eight) hours as needed., Disp: , Rfl:     aspirin 81 MG Oral Tab EC, Take 1 tablet (81 mg total) by mouth daily., Disp: 90 tablet, Rfl: 3    clopidogrel 75 MG Oral Tab, Take 1 tablet (75 mg total) by mouth nightly., Disp: 90 tablet, Rfl: 3    glipiZIDE 10 MG Oral Tab, Take 1 tablet (10 mg total) by mouth 2 (two) times daily before meals., Disp: 180 tablet, Rfl: 3    Glucose Blood (ONETOUCH VERIO) In Vitro Strip, Check glucose three times daily E11.65, Disp: 100 strip, Rfl: 1    insulin degludec (TRESIBA FLEXTOUCH) 200 UNIT/ML Subcutaneous Solution Pen-injector, Inject 20 Units into the skin nightly., Disp: 9 mL, Rfl: 3    Insulin Pen Needle (BD PEN NEEDLE HAL 2ND GEN) 32G X 4 MM Does not apply Misc, 1 NEEDLE AT BEDTIME. USE WITH TRESIBA NIGHTLY E11.65, Disp: 100 each, Rfl: 3    metFORMIN HCl 1000 MG Oral Tab, Take 1 tablet (1,000 mg total) by mouth 2 (two) times daily., Disp: 90 tablet, Rfl: 3    OneTouch UltraSoft 2 Lancets Does not apply Misc, 1 Lancet in the morning, at noon, and at bedtime. Check glucose three times daily E11.65, Disp: 100 each, Rfl: 3    simvastatin 20 MG Oral Tab, Take 1 tablet (20 mg total) by mouth daily., Disp: 90 tablet, Rfl: 3    Blood Glucose Monitoring Suppl (ONETOUCH ULTRA 2) w/Device Does not apply Kit, Check glucose three times daily E11.65, Disp: 1 kit, Rfl: 0    Continuous Glucose Sensor (FREESTYLE BRIDGER 2 SENSOR) Does not apply Misc, Inject 1 Device into the skin every 14 (fourteen) days., Disp: , Rfl:     empagliflozin (JARDIANCE) 10 MG Oral Tab, Take 1 tablet (10 mg total) by mouth daily. (Patient not taking: Reported on 1/23/2025), Disp: 90 tablet, Rfl: 3    LABSS:     Lab Results   Component Value Date     (H) 11/12/2024    A1C 9.4 (H) 11/12/2024      Lab Results   Component Value Date     (H) 11/12/2024    BUN 22 11/12/2024    CREATSERUM 1.27 11/12/2024     BUNCREA 17.3 11/12/2024    ANIONGAP 2 11/12/2024    CA 9.8 11/12/2024     11/12/2024    K 5.0 11/12/2024     11/12/2024    CO2 28.0 11/12/2024    OSMOCALC 302 (H) 11/12/2024      Lab Results   Component Value Date    WBC 10.4 05/13/2024    RBC 4.84 05/13/2024    HGB 13.4 05/13/2024    HCT 39.7 05/13/2024    MCV 82.0 05/13/2024    MCH 27.7 05/13/2024    MCHC 33.8 05/13/2024    RDW 12.9 05/13/2024    .0 05/13/2024        Lab Results   Component Value Date    HGB 13.4 05/13/2024    CREATSERUM 1.27 11/12/2024    CREATSERUM 1.50 (H) 05/13/2024       EXAM:   Ht 6' (1.829 m)   Wt 220 lb (99.8 kg)   BMI 29.84 kg/m²     Foot ulceration dressing not changed     ASSESSMENT    Diagnoses and all orders for this visit:    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  He was felt to be an acceptable candidate for endovascular revascularization. We discussed the benefits of the angiogram and the risks of acute thrombosis, distal embolization, nerve injury, restenosis, dissection or poorly controlled bleeding (which may be manifested as free hemorrhage or contained hematoma in the femoral or retroperitoneal areas), pseudoaneurysm, or arteriovenous fistula, infection, renal function worsening and need for open surgery among other complications. Hemorrhage and hematoma are usually evident within 12 hours after the procedure while other complications, such as pseudoaneurysm and arteriovenous fistula, may not become apparent for days to weeks afterward.  Patient understood and all questions were answered.      PLAN:       LLE angiogram with intervention      Samer F. Najjar MD  Division of Vascular Surgery

## 2025-01-27 ENCOUNTER — TELEPHONE (OUTPATIENT)
Facility: CLINIC | Age: 60
End: 2025-01-27

## 2025-01-27 NOTE — TELEPHONE ENCOUNTER
Called [facility] and went through automated system.   CPT codes 69241 and 89056 do not require prior authorization.   Last 4 of the reference #: [id number]  Transferred to a representative, spoke with Lyubov SMITH.  Let her know I just wanted to confirm information received.  Per Lyubov, CPT codes 57479 and 19456 do not require prior authorization.  Call Reference #: [id number]

## 2025-01-27 NOTE — TELEPHONE ENCOUNTER
Patient needs LLE Angiogram with possible intervention. Called and spoke with patient. Scheduled for Wednesday 02/05/2025. He was receptive. Let him know that a nurse from Cath Lab will be calling him to give him further instructions. He understood.

## 2025-01-31 ENCOUNTER — LAB ENCOUNTER (OUTPATIENT)
Dept: LAB | Facility: HOSPITAL | Age: 60
End: 2025-01-31
Attending: SURGERY
Payer: MEDICARE

## 2025-01-31 DIAGNOSIS — Z01.818 PRE-OP TESTING: ICD-10-CM

## 2025-01-31 LAB
ANION GAP SERPL CALC-SCNC: 8 MMOL/L (ref 0–18)
BUN BLD-MCNC: 17 MG/DL (ref 9–23)
BUN/CREAT SERPL: 13.6 (ref 10–20)
CALCIUM BLD-MCNC: 9.4 MG/DL (ref 8.7–10.4)
CHLORIDE SERPL-SCNC: 107 MMOL/L (ref 98–112)
CO2 SERPL-SCNC: 24 MMOL/L (ref 21–32)
CREAT BLD-MCNC: 1.25 MG/DL
DEPRECATED RDW RBC AUTO: 44.1 FL (ref 35.1–46.3)
EGFRCR SERPLBLD CKD-EPI 2021: 66 ML/MIN/1.73M2 (ref 60–?)
ERYTHROCYTE [DISTWIDTH] IN BLOOD BY AUTOMATED COUNT: 15.1 % (ref 11–15)
FASTING STATUS PATIENT QL REPORTED: YES
GLUCOSE BLD-MCNC: 189 MG/DL (ref 70–99)
HCT VFR BLD AUTO: 30.2 %
HGB BLD-MCNC: 9.7 G/DL
MCH RBC QN AUTO: 25.5 PG (ref 26–34)
MCHC RBC AUTO-ENTMCNC: 32.1 G/DL (ref 31–37)
MCV RBC AUTO: 79.5 FL
OSMOLALITY SERPL CALC.SUM OF ELEC: 295 MOSM/KG (ref 275–295)
PLATELET # BLD AUTO: 536 10(3)UL (ref 150–450)
POTASSIUM SERPL-SCNC: 5.3 MMOL/L (ref 3.5–5.1)
RBC # BLD AUTO: 3.8 X10(6)UL
SODIUM SERPL-SCNC: 139 MMOL/L (ref 136–145)
WBC # BLD AUTO: 10 X10(3) UL (ref 4–11)

## 2025-01-31 PROCEDURE — 80048 BASIC METABOLIC PNL TOTAL CA: CPT

## 2025-01-31 PROCEDURE — 36415 COLL VENOUS BLD VENIPUNCTURE: CPT

## 2025-01-31 PROCEDURE — 85027 COMPLETE CBC AUTOMATED: CPT

## 2025-02-05 ENCOUNTER — HOSPITAL ENCOUNTER (OUTPATIENT)
Dept: INTERVENTIONAL RADIOLOGY/VASCULAR | Facility: HOSPITAL | Age: 60
Discharge: HOME | End: 2025-02-05
Attending: SURGERY | Admitting: SURGERY
Payer: MEDICARE

## 2025-02-05 VITALS
OXYGEN SATURATION: 98 % | DIASTOLIC BLOOD PRESSURE: 86 MMHG | HEART RATE: 91 BPM | WEIGHT: 220 LBS | HEIGHT: 72 IN | BODY MASS INDEX: 29.8 KG/M2 | RESPIRATION RATE: 13 BRPM | SYSTOLIC BLOOD PRESSURE: 114 MMHG

## 2025-02-05 DIAGNOSIS — Z01.818 PRE-OP TESTING: Primary | ICD-10-CM

## 2025-02-05 DIAGNOSIS — I70.244 ATHSCL NATIVE ART OF LEFT LEG W ULCER OF HEEL AND MIDFOOT (HCC): ICD-10-CM

## 2025-02-05 LAB — GLUCOSE BLDC GLUCOMTR-MCNC: 237 MG/DL (ref 70–99)

## 2025-02-05 PROCEDURE — 82962 GLUCOSE BLOOD TEST: CPT

## 2025-02-05 PROCEDURE — 047S3ZZ DILATION OF LEFT POSTERIOR TIBIAL ARTERY, PERCUTANEOUS APPROACH: ICD-10-PCS | Performed by: SURGERY

## 2025-02-05 PROCEDURE — 37228 HC TRANSLUMINAL ANGIO TIBIAL PERONEAL UNILAT INIT: CPT | Performed by: SURGERY

## 2025-02-05 PROCEDURE — 36415 COLL VENOUS BLD VENIPUNCTURE: CPT

## 2025-02-05 PROCEDURE — 37232 HC TRANSL ANGIOPLASTY TIBIAL PERONEAL UNIL EA ADDL: CPT | Performed by: SURGERY

## 2025-02-05 PROCEDURE — 99152 MOD SED SAME PHYS/QHP 5/>YRS: CPT | Performed by: SURGERY

## 2025-02-05 PROCEDURE — 047U3ZZ DILATION OF LEFT PERONEAL ARTERY, PERCUTANEOUS APPROACH: ICD-10-PCS | Performed by: SURGERY

## 2025-02-05 PROCEDURE — 99153 MOD SED SAME PHYS/QHP EA: CPT | Performed by: SURGERY

## 2025-02-05 RX ORDER — IOPAMIDOL 612 MG/ML
80 INJECTION, SOLUTION INTRAVASCULAR
Status: COMPLETED | OUTPATIENT
Start: 2025-02-05 | End: 2025-02-05

## 2025-02-05 RX ORDER — HEPARIN SODIUM 1000 [USP'U]/ML
INJECTION, SOLUTION INTRAVENOUS; SUBCUTANEOUS
Status: COMPLETED
Start: 2025-02-05 | End: 2025-02-05

## 2025-02-05 RX ORDER — SODIUM CHLORIDE 9 MG/ML
INJECTION, SOLUTION INTRAVENOUS CONTINUOUS
Status: DISCONTINUED | OUTPATIENT
Start: 2025-02-05 | End: 2025-02-05

## 2025-02-05 RX ORDER — LIDOCAINE HYDROCHLORIDE 20 MG/ML
INJECTION, SOLUTION EPIDURAL; INFILTRATION; INTRACAUDAL; PERINEURAL
Status: COMPLETED
Start: 2025-02-05 | End: 2025-02-05

## 2025-02-05 RX ORDER — PROTAMINE SULFATE 10 MG/ML
INJECTION, SOLUTION INTRAVENOUS
Status: DISCONTINUED
Start: 2025-02-05 | End: 2025-02-05 | Stop reason: WASHOUT

## 2025-02-05 RX ORDER — MIDAZOLAM HYDROCHLORIDE 1 MG/ML
INJECTION INTRAMUSCULAR; INTRAVENOUS
Status: COMPLETED
Start: 2025-02-05 | End: 2025-02-05

## 2025-02-05 RX ADMIN — IOPAMIDOL 80 ML: 612 INJECTION, SOLUTION INTRAVASCULAR at 09:13:00

## 2025-02-05 RX ADMIN — SODIUM CHLORIDE: 9 INJECTION, SOLUTION INTRAVENOUS at 07:10:00

## 2025-02-05 NOTE — OPERATIVE REPORT
[facility]    PATIENTS NAME: Yo Villagomez  ATTENDING PHYSICIAN: Najjar, Samer F, MD  OPERATING PHYSICIAN: Samer F Najjar, MD  CSN: [id number]     LOCATION:  CATH LAB  MRN: [id number]    YOB: 1965  ADMISSION DATE: 2/5/2025  OPERATION DATE: 2/5/2025                CATH LAB PROCEDURE REPORT       PRE-OPERATIVE DIAGNOSIS:  left lower extremity atherosclerosis with ulceration     POST-OPERATIVE DIAGNOSIS: Same as above.    PROCEDURE PERFORMED:   Angioplasty of the left posterior tibial artery  Angioplasty of the left peroneal artery    ANESTHESIA: Moderate conscious sedation using Versed and Fentanyl was provided.  The RN monitored the oxygen, heart rate and blood pressure under my direct supervision during the course of the procedure.    SURGEON: Samer F Najjar, MD    SEDATION TIME: 48 min    SEDATION MEDICATION:   Versed: 1 mg  Fentanyl: 50 mcg    CONTRAST AMOUNT: 80 ml    EBL: 20 ml    COMPLICATIONS: None    SUMMARY OF CASE: Focal area of moderate stenosis in the posterior tibial artery and the tibioperoneal trunk, responded well to angioplasty.  The peroneal artery was occluded proximally and we were able to recanalize it and angioplasty it.  The patient now has a two-vessel runoff    INDICATIONS: The patient is a 59 year old male with left lower extremity atherosclerosis with ulceration of a TMA. He was felt to be an acceptable candidate for endovascular revascularization. We discussed the benefits of the angiogram and the risks of acute thrombosis, distal embolization, nerve injury, restenosis, dissection or poorly controlled bleeding (which may be manifested as free hemorrhage or contained hematoma in the femoral or retroperitoneal areas), pseudoaneurysm, or arteriovenous fistula, infection, renal function worsening and need for open surgery among other complications. Hemorrhage and hematoma are usually evident within 12 hours after the procedure while other complications, such as  pseudoaneurysm and arteriovenous fistula, may not become apparent for days to weeks afterward.  Patient understood and all questions were answered.    DESCRIPTION OF PROCEDURE:  The patient was brought to the catheterization lab and laid supine on the table.  After induction of sedation, the groin areas were prepped and draped in the usual surgical sterile fashion.  A micropuncture needle was then used to access the right common femoral artery after the overlying skin area was infiltrated with 1% lidocaine solution.  A micropuncture wire was then advanced and followed by placement of a 4-Gabonese micro sheath which was then exchanged to a 5-Gabonese sheath over a Glidewire Advantage wire.  An IM catheter was then advanced into the distal aorta where a quick distal aortic angiogram was performed.  This revealed patency of the aorta, both common iliac, internal and external iliac arteries.  There was moderate calcification present but no stenosis.  The IM catheter was then maneuvered down into the left common femoral artery area, and an angiogram was performed in that location and this revealed patency of the common femoral, profunda, and superficial femoral arteries.   Then, imaging of the tibial vessels revealed the presence of a focal area of moderate stenosis in the posterior tibial artery and the tibioperoneal trunk,  as well as occlusion of the proximal segment of the peroneal artery.  Therefore, an intervention was felt to be possible and the sheath was then exchanged to a 6-Gabonese 70 cm long Terumo sheath which was then advanced and parked in the distal superficial femoral artery.  The patient was anticoagulated with 80 units/kg of heparin, and using a combination of an 0.014 Spex catheter and an 0.014 Glidewire advantage wire, we were able to cross the area of stenosis in the posterior tibial artery and treated with angioplasty using a [place] Balloon 2.5 x 100 mm with excellent results.  Then the tibioperoneal  trunk was treated in a similar fashion.  Then the ostium of the peroneal artery was engaged with the 014 wire and I was able to advance the wire down into the peroneal artery and proceeded with angioplasty using a 3 x 60 mm coyote balloon followed by 4 x 60 balloon.  The results revealed excellent recanalization of the proximal peroneal artery with a nonflow limiting dissection.  There was excellent flow all the way down to the foot mainly through the posterior tibial artery.  At this point it was felt that the patient has been maximally revascularized.  The patient was given 40 mg of protamine.  The sheath was then pulled back to the right iliac artery and an angiogram of the femoral artery access area revealed proper access in the mid common femoral area. Therefore, the sheath was pulled and a Perclose device was then deployed successfully.  Pressure was held until hemostasis was achieved.  There were no complications.

## 2025-02-05 NOTE — H&P
Samer F. Najjar, MD  Vascular Surgery  [facility]         VASCULAR SURGERY HP  NOTE           Name: Yo Villagomez   : 3/27/1965  [id number]      REASON FOR VISIT:   Patient is a 59 year old male who is here for LLE angiogram with possible intervention.   He has a history of diabetes and peripheral arterial disease and has previously undergone right lower extremity revascularization at [facility] with angioplasty of his tibial vessels after which he underwent right 5th ray amputation (10/2022).  Prior to that he underwent a left TMA (2021).  He states that the TMA actually healed but developed two plantar ulcers and he has been seen initially at the [facility] wound care [place] and now he is being seen at the [facility] as his primary care doctor is at [place] .  He states that he would like to concentrate his physicians with the [place] system.  He was a heavy smoker but now he has been trying to cut down to half a pack.  He has been noticing that his left lower extremity has moderate lymphedema and he has not worn compression stockings.     Duplex imaging demonstrates a high-grade stenosis (> 75%, ratio: 4.7) of the tibioperoneal trunk artery. Duplex imaging demostrates a hemodyncamically significant stenosis (>50%) of the deep femoral artery (ratio: 3.1) and distal popliteal artery (ratio: 3.7). The mid & distal anterior tibial artery are occluded  PAST MEDICAL HISTORY:      Past Medical History       Past Medical History:    Diabetes (HCC)    Diabetic neuropathy (HCC)    Dyslipidemia    ED (erectile dysfunction)    Erectile dysfunction    Hypertension    Necrotizing soft tissue infection    Obesity    Other diabetic neurological complication associated with type 2 diabetes mellitus (HCC)    PAD (peripheral artery disease) (HCC)    Sepsis (HCC)    Smoker            PAST SURGICAL HISTORY:   Past Surgical History   No past surgical history on file.         MEDICATIONS:     Medications  - Current      Current Outpatient Medications:     ibuprofen 600 MG Oral Tab, Take 1 tablet (600 mg total) by mouth every 8 (eight) hours as needed., Disp: , Rfl:     aspirin 81 MG Oral Tab EC, Take 1 tablet (81 mg total) by mouth daily., Disp: 90 tablet, Rfl: 3    clopidogrel 75 MG Oral Tab, Take 1 tablet (75 mg total) by mouth nightly., Disp: 90 tablet, Rfl: 3    glipiZIDE 10 MG Oral Tab, Take 1 tablet (10 mg total) by mouth 2 (two) times daily before meals., Disp: 180 tablet, Rfl: 3    Glucose Blood (ONETOUCH VERIO) In Vitro Strip, Check glucose three times daily E11.65, Disp: 100 strip, Rfl: 1    insulin degludec (TRESIBA FLEXTOUCH) 200 UNIT/ML Subcutaneous Solution Pen-injector, Inject 20 Units into the skin nightly., Disp: 9 mL, Rfl: 3    Insulin Pen Needle (BD PEN NEEDLE HAL 2ND GEN) 32G X 4 MM Does not apply Misc, 1 NEEDLE AT BEDTIME. USE WITH TRESIBA NIGHTLY E11.65, Disp: 100 each, Rfl: 3    metFORMIN HCl 1000 MG Oral Tab, Take 1 tablet (1,000 mg total) by mouth 2 (two) times daily., Disp: 90 tablet, Rfl: 3    OneTouch UltraSoft 2 Lancets Does not apply Misc, 1 Lancet in the morning, at noon, and at bedtime. Check glucose three times daily E11.65, Disp: 100 each, Rfl: 3    simvastatin 20 MG Oral Tab, Take 1 tablet (20 mg total) by mouth daily., Disp: 90 tablet, Rfl: 3    Blood Glucose Monitoring Suppl (ONETOUCH ULTRA 2) w/Device Does not apply Kit, Check glucose three times daily E11.65, Disp: 1 kit, Rfl: 0    Continuous Glucose Sensor (FREESTYLE BRIDGER 2 SENSOR) Does not apply Misc, Inject 1 Device into the skin every 14 (fourteen) days., Disp: , Rfl:     empagliflozin (JARDIANCE) 10 MG Oral Tab, Take 1 tablet (10 mg total) by mouth daily. (Patient not taking: Reported on 1/23/2025), Disp: 90 tablet, Rfl: 3        LABSS:            Lab Results   Component Value Date      (H) 11/12/2024     A1C 9.4 (H) 11/12/2024            Lab Results   Component Value Date      (H) 11/12/2024     BUN 22  11/12/2024     CREATSERUM 1.27 11/12/2024     BUNCREA 17.3 11/12/2024     ANIONGAP 2 11/12/2024     CA 9.8 11/12/2024      11/12/2024     K 5.0 11/12/2024      11/12/2024     CO2 28.0 11/12/2024     OSMOCALC 302 (H) 11/12/2024            Lab Results   Component Value Date     WBC 10.4 05/13/2024     RBC 4.84 05/13/2024     HGB 13.4 05/13/2024     HCT 39.7 05/13/2024     MCV 82.0 05/13/2024     MCH 27.7 05/13/2024     MCHC 33.8 05/13/2024     RDW 12.9 05/13/2024     .0 05/13/2024               Lab Results   Component Value Date     HGB 13.4 05/13/2024     CREATSERUM 1.27 11/12/2024     CREATSERUM 1.50 (H) 05/13/2024         EXAM:   Ht 6' (1.829 m)   Wt 220 lb (99.8 kg)   BMI 29.84 kg/m²      Foot ulceration dressing not changed     ASSESSMENT    Diagnoses and all orders for this visit:     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  He was felt to be an acceptable candidate for endovascular revascularization. We discussed the benefits of the angiogram and the risks of acute thrombosis, distal embolization, nerve injury, restenosis, dissection or poorly controlled bleeding (which may be manifested as free hemorrhage or contained hematoma in the femoral or retroperitoneal areas), pseudoaneurysm, or arteriovenous fistula, infection, renal function worsening and need for open surgery among other complications. Hemorrhage and hematoma are usually evident within 12 hours after the procedure while other complications, such as pseudoaneurysm and arteriovenous fistula, may not become apparent for days to weeks afterward.  Patient understood and all questions were answered.        PLAN:         LLE angiogram with intervention        Samer F. Najjar MD  Division of Vascular Surgery

## 2025-02-05 NOTE — DISCHARGE INSTRUCTIONS
HOME CARE INSTRUCTIONS FOLLOWING PERIPHERAL ANGIOGRAPHY, ANGIOPLASTY (PTCA/PTA) OR INSERTION OF STENT IN THE PERIPHERAL ARTERIES      Activity     DO NOT drive after the procedure.  You may resume driving late the following day according to the nurse or physician's instructions  Plan on resting and relaxing tonight and tomorrow  Resume your normal activity after 48 hours, or as instructed by your physician  Do not lift anything over 10 pounds for 1 WEEK  Avoid repeated stair use and excessive walking for the next 24 hours.  Avoid repetitive squatting/bending exercises/motions for 1 week.   Avoid drinking alcohol for the next 24 hours      What is Normal?    A small lump at the procedure site associated with mild tenderness when touched  The procedure site may be bruised or discolored  There may be a small amount of drainage on the bandage    Special Instructions    Drink plenty of fluids during the next 24 hours to \"flush\" the contrast from your system  Keep the bandage clean and dry   After 24 hours, remove the bandage   You may shower after removing the bandage, and wash the procedure site gently with soap and water  Do NOT apply any powders, ointments or creams to the site for 1 week.   DO NOT submerge the procedure site for 1 WEEK (no bath tubs or pools)  If you choose to wear a bandage for a few days, make sure it remains clean and dry and that it is changed daily    Additional Instructions:  Do not take glucophage/metformin containing products for at least 48 hours after procedure, unless otherwise directed by your physician.    When you should NOTIFY YOUR PHYSICIAN    Bleeding can occur at the procedure site - both on the outside of the skin and/or beneath the surface of the skin  Swelling or a large lump at the procedure site can occur, which may be accompanied by moderate to severe pain    If either of the above occurs, lie down flat.    Have someone apply pressure to the procedure site with both hands, as  instructed by the nurse.    Hold pressure for 20 minutes and the bleeding should stop.    Notify your physician of the occurrence  If the bleeding does not stop, call 911 and continue to apply pressure    If you experience signs of a fever, temperature > 101°, chills, infection (redness, swelling, thick yellow drainage, or a foul odor from the procedure site)  If you notice any numbness, tingling, or loss of feeling to your leg or foot or groin access    Other    You may resume your present diet, unless otherwise specified by your physician.  A list of your medications was provided to you at discharge.  Please call your physician’s office for a follow-up appointment.       Closure device utilized?  Yes (see additional instructions below)  If yes, type of closure used: Perclose - Permanent suture in the blood vessel of the groin

## 2025-02-05 NOTE — IVS NOTE
DISCHARGE NOTE     Pt is able to sit up and ambulate without difficulty.   Pt voided and tolerated fluids and food.   Procedural site remains dry and intact with good circulation, motion, and sensation.   No signs and symptoms of bleeding/hematoma noted.   IV access removed  Instruction provided, patient/family verbalizes understanding.   Dr. Najjar spoke with patient/family post procedure.     Pt discharge via wheelchair to [facility]       Follow up Appointment: in 2 weeks with najjar    New Prescription: none

## 2025-02-10 PROBLEM — I70.244 ATHSCL NATIVE ART OF LEFT LEG W ULCER OF HEEL AND MIDFOOT (HCC): Status: ACTIVE | Noted: 2025-02-10

## 2025-02-12 ENCOUNTER — APPOINTMENT (OUTPATIENT)
Dept: WOUND CARE | Facility: HOSPITAL | Age: 60
End: 2025-02-12
Attending: NURSE PRACTITIONER
Payer: MEDICARE

## 2025-02-16 ENCOUNTER — HOSPITAL ENCOUNTER (OUTPATIENT)
Dept: MRI IMAGING | Facility: HOSPITAL | Age: 60
End: 2025-02-16
Attending: NURSE PRACTITIONER
Payer: MEDICARE

## 2025-02-16 ENCOUNTER — HOSPITAL ENCOUNTER (OUTPATIENT)
Dept: MRI IMAGING | Facility: HOSPITAL | Age: 60
Discharge: HOME | End: 2025-02-16
Attending: NURSE PRACTITIONER
Payer: MEDICARE

## 2025-02-16 DIAGNOSIS — E11.621 DIABETIC ULCER OF RIGHT MIDFOOT ASSOCIATED WITH TYPE 2 DIABETES MELLITUS, WITH BONE INVOLVEMENT WITHOUT EVIDENCE OF NECROSIS (HCC): ICD-10-CM

## 2025-02-16 DIAGNOSIS — E11.621 DIABETIC ULCER OF LEFT MIDFOOT ASSOCIATED WITH TYPE 2 DIABETES MELLITUS, LIMITED TO BREAKDOWN OF SKIN (HCC): ICD-10-CM

## 2025-02-16 DIAGNOSIS — L97.421 DIABETIC ULCER OF LEFT MIDFOOT ASSOCIATED WITH TYPE 2 DIABETES MELLITUS, LIMITED TO BREAKDOWN OF SKIN (HCC): ICD-10-CM

## 2025-02-16 DIAGNOSIS — L97.416 DIABETIC ULCER OF RIGHT MIDFOOT ASSOCIATED WITH TYPE 2 DIABETES MELLITUS, WITH BONE INVOLVEMENT WITHOUT EVIDENCE OF NECROSIS (HCC): ICD-10-CM

## 2025-02-16 PROCEDURE — 73720 MRI LWR EXTREMITY W/O&W/DYE: CPT | Performed by: NURSE PRACTITIONER

## 2025-02-16 PROCEDURE — A9575 INJ GADOTERATE MEGLUMI 0.1ML: HCPCS | Performed by: NURSE PRACTITIONER

## 2025-02-16 RX ORDER — GADOTERATE MEGLUMINE 376.9 MG/ML
20 INJECTION INTRAVENOUS
Status: COMPLETED | OUTPATIENT
Start: 2025-02-16 | End: 2025-02-16

## 2025-02-16 RX ADMIN — GADOTERATE MEGLUMINE 20 ML: 376.9 INJECTION INTRAVENOUS at 10:46:00

## 2025-02-20 ENCOUNTER — OFFICE VISIT (OUTPATIENT)
Dept: WOUND CARE | Facility: HOSPITAL | Age: 60
End: 2025-02-20
Attending: NURSE PRACTITIONER
Payer: MEDICARE

## 2025-02-20 VITALS — SYSTOLIC BLOOD PRESSURE: 116 MMHG | HEART RATE: 99 BPM | OXYGEN SATURATION: 100 % | DIASTOLIC BLOOD PRESSURE: 74 MMHG

## 2025-02-20 DIAGNOSIS — E11.621 DIABETIC ULCER OF LEFT MIDFOOT ASSOCIATED WITH TYPE 2 DIABETES MELLITUS, LIMITED TO BREAKDOWN OF SKIN (HCC): Primary | ICD-10-CM

## 2025-02-20 DIAGNOSIS — Z98.890 PERIPHERAL ARTERIAL DISEASE WITH HISTORY OF REVASCULARIZATION: ICD-10-CM

## 2025-02-20 DIAGNOSIS — M86.9 OSTEOMYELITIS OF LEFT FOOT, UNSPECIFIED TYPE (HCC): ICD-10-CM

## 2025-02-20 DIAGNOSIS — Z79.4 UNCONTROLLED TYPE 2 DIABETES MELLITUS WITH HYPERGLYCEMIA, WITH LONG-TERM CURRENT USE OF INSULIN (HCC): ICD-10-CM

## 2025-02-20 DIAGNOSIS — F17.200 TOBACCO DEPENDENCE WITH CURRENT USE: ICD-10-CM

## 2025-02-20 DIAGNOSIS — I73.9 PERIPHERAL ARTERIAL DISEASE WITH HISTORY OF REVASCULARIZATION: ICD-10-CM

## 2025-02-20 DIAGNOSIS — E11.65 UNCONTROLLED TYPE 2 DIABETES MELLITUS WITH HYPERGLYCEMIA, WITH LONG-TERM CURRENT USE OF INSULIN (HCC): ICD-10-CM

## 2025-02-20 DIAGNOSIS — L97.421 DIABETIC ULCER OF LEFT MIDFOOT ASSOCIATED WITH TYPE 2 DIABETES MELLITUS, LIMITED TO BREAKDOWN OF SKIN (HCC): Primary | ICD-10-CM

## 2025-02-20 PROCEDURE — 97597 DBRDMT OPN WND 1ST 20 CM/<: CPT | Performed by: NURSE PRACTITIONER

## 2025-02-20 NOTE — PROGRESS NOTES
Patient ID: Yo Villagomez is a 59 year old male.    Debridement Diabetic Ulcer Plantar;Left Foot   Wound 12/12/24 1 Foot Plantar;Left    Performed by: Maite Goddard APRN  Authorized by: Maite Goddard APRN      Consent   Consent obtained? verbal  Consent given by: patient  Risks discussed? procedural risks discussed  Time out called at 2/20/2025 3:03 PM  Immediately prior to the procedure a time out was called and the performing provider verified the correct patient, procedure, equipment, support staff, and site/side marked as required.    Debridement Details  Performed by: NP  Debridement type: conservative sharp  Pain control: none    Pre-debridement measurements  Length (cm): 1.2  Width (cm): 2  Depth (cm): 0.6  Surface Area (cm^2): 2.4    Post-debridement measurements  Length (cm): 1.2  Width (cm): 2  Depth (cm): 0.6  Percent debrided: 100%  Surface Area (cm^2): 2.4  Area Debrided (cm^2): 2.4  Volume (cm^3): 1.44    Devitalized tissue debrided: biofilm and callus  Instrument(s) utilized: blade  Bleeding: none  Hemostasis obtained with: not applicable  Procedural pain (0-10): 0  Post-procedural pain: 0   Response to treatment: procedure was tolerated well    Debridement Diabetic Ulcer Left;Lateral;Plantar Foot   Wound 12/12/24 2 Foot Left;Lateral;Plantar    Performed by: Maite Goddard APRN  Authorized by: Maite Goddard APRN      Consent   Consent obtained? verbal  Consent given by: patient  Risks discussed? procedural risks discussed  Time out called at 2/20/2025 3:04 PM  Immediately prior to the procedure a time out was called and the performing provider verified the correct patient, procedure, equipment, support staff, and site/side marked as required.    Debridement Details  Performed by: NP  Debridement type: conservative sharp  Pain control: none    Pre-debridement measurements  Length (cm): 0.8  Width (cm): 1.1  Depth (cm): 0.5  Surface Area (cm^2): 0.88    Post-debridement  measurements  Length (cm): 1.9  Width (cm): 1.1  Depth (cm): 0.4  Percent debrided: 100%  Surface Area (cm^2): 2.09  Area Debrided (cm^2): 2.09  Volume (cm^3): 0.84    Devitalized tissue debrided: biofilm and callus  Instrument(s) utilized: blade and forceps  Bleeding: none  Hemostasis obtained with: not applicable  Procedural pain (0-10): insensate  Post-procedural pain: insensate   Response to treatment: procedure was tolerated well

## 2025-02-20 NOTE — PROGRESS NOTES
Chief Complaint   Patient presents with    Wound Recheck     Here for recheck of left foot, No sx of concern no additional pain      HPI:   2/20/25: F/u b/l foot wounds. S/p angioplasty of left post tib and left peroneal artery 2/5/25 (Dr. Najjar). Has continued to apply 1/4 st dakin's gauze to the wound sites on his left foot. Completed MRI a few days ago. Says he has been feeling well. No new concerns.    1/22/25: F/u b/l foot wounds. Saw Dr. Holliday 1/20/25. Wounds were debrided. Pt has arterial US scheduled tomorrow and MRI 2/16/25.     1/8/25: F/u b/l foot wounds. No drainage from the right foot. Has been applying Dakins dressing BID since last visit. Hasn't scheduled vascular testing or MRI yet. Says he is feeling well, denies change in health since last visit.    12/17/24: F/u visit for b/l foot wounds. Saw Dr. Najjar prior to this appointment. Referred for b/l arterial duplex and carotid dopplers. Pt also has upcoming appointment with Dr. Holliday on 1/20/25. Pt reports he started using the Dakin's dressing a few days ago.     12/12/24: Pt returns to wound care, was lost to follow up over past 6 months. Says he has been cleaning the wounds and keeping them covered. Wants to get back on track with health. He was recently started on insulin by his PCP. He doesn't check his BG regularly. Does his best to minimize weight bearing. Has appt to see vascular surgery next week. PCP has also referred to podiatry, no appt yet. Says he still smokes cigarettes, trying to cut back. Drinks wine once a week and denies recreational drug use at this time.     6/11/24: F/u left DFU. Saw his PCP last week, says he started on insulin. Received podiatry referral as well, has not scheduled appointment. Did not receive vascular surgery referral. Reports he has been doing well with dressing changes BID for the past week. Reports the swelling in his leg occurs if he is up and about for 3 hours, resolves when he sits and elevates legs.  He denies fever, chill, pain. Says Denies new concerns.    6/5/24: F/u left DFU. Had difficulty filling Rx for Dakin's. Says he picked up at a specialty pharmacy yesterday, 1/2 strength instead of 1/4 strength. Reports he has been doing well, denies change in health since last visit. Has a follow up visit with his PCP tomorrow, will request updated referrals for [facility] and vascular surgery.     5/23/24: F/u visit for left foot wounds. Didn't  Dakin's solution after last visit and hasn't received gauze and tape from [facility], therefore, says he has continued to apply betadine and gauze to wounds. Denies change in health since last visit, no new concerns.    5/16/24: 59 yr old male here today for chronic wound on left foot. His current medical problems include uncontrolled T2DM (A1c 15.5% on 5/13/24), diabetic neuropathy, tobacco use (1/2 PPD), HLD, PAD. 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 He underwent RLE angiogram with intervention 2/20/23. He reports he had been seeing a podiatrist regularly until his insurance changed this year. He noticed wounds developed on his left foot about 4 months ago. He  has been applying betadine and covering with gauze almost every day. He denies drainage from the right foot and has noticed the skin changes over the past year as well. He has chronic intermittent pain in both feet for which he reports he previously received Hardy from his podiatrist. He used to wear a boot to offload wounds, no longer has it. He tries to walk on his heel as much as possible. PCP ordered a cane, but is having a hard time getting from DME company. He lives alone, says he stays with his mother often. He has cut back on tobacco use from 1 PPD to 1/2 PPD. He denies recent alcohol use and denies recent illicit substance use.     Lab Results   Component Value Date    BUN 17 01/31/2025    CREATSERUM 1.25 01/31/2025    ALB 4.0 05/13/2024    TP 7.8  05/13/2024    A1C 9.4 (H) 11/12/2024         Current Outpatient Medications:     ibuprofen 600 MG Oral Tab, Take 1 tablet (600 mg total) by mouth every 8 (eight) hours as needed., Disp: , Rfl:     aspirin 81 MG Oral Tab EC, Take 1 tablet (81 mg total) by mouth daily., Disp: 90 tablet, Rfl: 3    clopidogrel 75 MG Oral Tab, Take 1 tablet (75 mg total) by mouth nightly., Disp: 90 tablet, Rfl: 3    empagliflozin (JARDIANCE) 10 MG Oral Tab, Take 1 tablet (10 mg total) by mouth daily. (Patient not taking: Reported on 1/23/2025), Disp: 90 tablet, Rfl: 3    glipiZIDE 10 MG Oral Tab, Take 1 tablet (10 mg total) by mouth 2 (two) times daily before meals., Disp: 180 tablet, Rfl: 3    Glucose Blood (ONETOUCH VERIO) In Vitro Strip, Check glucose three times daily E11.65, Disp: 100 strip, Rfl: 1    insulin degludec (TRESIBA FLEXTOUCH) 200 UNIT/ML Subcutaneous Solution Pen-injector, Inject 20 Units into the skin nightly., Disp: 9 mL, Rfl: 3    Insulin Pen Needle (BD PEN NEEDLE HAL 2ND GEN) 32G X 4 MM Does not apply Misc, 1 NEEDLE AT BEDTIME. USE WITH TRESIBA NIGHTLY E11.65, Disp: 100 each, Rfl: 3    metFORMIN HCl 1000 MG Oral Tab, Take 1 tablet (1,000 mg total) by mouth 2 (two) times daily., Disp: 90 tablet, Rfl: 3    OneTouch UltraSoft 2 Lancets Does not apply Misc, 1 Lancet in the morning, at noon, and at bedtime. Check glucose three times daily E11.65, Disp: 100 each, Rfl: 3    simvastatin 20 MG Oral Tab, Take 1 tablet (20 mg total) by mouth daily. (Patient taking differently: Take 1 tablet (20 mg total) by mouth nightly.), Disp: 90 tablet, Rfl: 3    Blood Glucose Monitoring Suppl (ONETOUCH ULTRA 2) w/Device Does not apply Kit, Check glucose three times daily E11.65, Disp: 1 kit, Rfl: 0    Continuous Glucose Sensor (FREESTYLE BRIDGER 2 SENSOR) Does not apply Misc, Inject 1 Device into the skin every 14 (fourteen) days., Disp: , Rfl:     Allergies   Allergen Reactions    Ace Inhibitors UNKNOWN      REVIEW OF SYSTEMS:   Review of  Systems   Constitutional:  Negative for activity change, chills and fever.   Respiratory:  Negative for shortness of breath.    Cardiovascular:  Negative for chest pain and leg swelling.   Gastrointestinal:  Negative for abdominal pain, diarrhea and vomiting.   Musculoskeletal:  Positive for arthralgias and gait problem.   Skin:  Positive for wound. Negative for rash.   Neurological:  Negative for dizziness, numbness and headaches.      PHYSICAL EXAM:   /74 (BP Location: Right arm)   Pulse 99   SpO2 100%    Estimated body mass index is 29.84 kg/m² as calculated from the following:    Height as of 1/28/25: 72\".    Weight as of 1/28/25: 220 lb.   Vital signs reviewed.Appears stated age, well groomed.    Physical Exam  Constitutional:       General: He is not in acute distress.     Appearance: Normal appearance. He is obese. He is not ill-appearing.   HENT:      Head: Normocephalic and atraumatic.   Cardiovascular:      Pulses:           Dorsalis pedis pulses are 2+ on the right side and 1+ on the left side.        Posterior tibial pulses are 2+ on the right side and 1+ on the left side.   Pulmonary:      Effort: Pulmonary effort is normal.   Musculoskeletal:      Right foot: Deformity present.      Left foot: Deformity present.      Right Lower Extremity: Right leg is amputated below ankle. (Right 5th toe)     Left Lower Extremity: Left leg is amputated below ankle. (TMA)  Feet:      Right foot:      Skin integrity: Callus and dry skin present. No ulcer, erythema or warmth.      Toenail Condition: Right toenails are abnormally thick and long.      Left foot:      Skin integrity: Ulcer, callus and dry skin present. No erythema or warmth.      Toenail Condition: Left toenails are abnormally thick and long.   Skin:     General: Skin is warm and dry.      Capillary Refill: Capillary refill takes 2 to 3 seconds.      Findings: Wound (left foot) present.   Neurological:      General: No focal deficit present.       Mental Status: He is alert and oriented to person, place, and time.   Psychiatric:         Mood and Affect: Mood normal.         Behavior: Behavior normal.        Wound 12/12/24 1 Foot Plantar;Left (Active)   Date First Assessed/Time First Assessed: 12/12/24 1101    Wound Number (Wound Clinic Only): 1  Primary Wound Type: Diabetic Ulcer  Location: Foot  Wound Location Orientation: Plantar;Left      Assessments 12/12/2024 11:04 AM 2/20/2025  2:36 PM   Wound Image        Site Assessment Moist;Red;Granulation tissue Moist;Pink;Red;White   Closure Not approximated Not approximated   Drainage Amount Moderate Large   Drainage Description Serosanguineous Serosanguineous;Brown   Treatments Cleansed;Saline;Wound ;Dakins;Shoe Cleansed;Saline;Wound ;Dakins   Shoe Type Felt Felt   Dressing Gauze;Tape Gauze;Tape   Dressing Changed New Changed   Dressing Status Clean;Dry;Intact Clean;Dry;Intact   Wound Length (cm) 2.3 cm 1.2 cm   Wound Width (cm) 2.3 cm 2 cm   Wound Surface Area (cm^2) 5.29 cm^2 2.4 cm^2   Wound Depth (cm) 1 cm 0.6 cm   Wound Volume (cm^3) 5.29 cm^3 1.44 cm^3   Wound Healing % -- 73   Margins Well-defined edges;Not attached Well-defined edges;Not attached   Non-staged Wound Description Full thickness Full thickness   Marcy-wound Assessment Dry;Callous Callous;Dry;Hyperpigmented   Wound Granulation Tissue Red Red   Wound Bed Granulation (%) 100 % 100 %   Wound Bed Epithelium (%) 0 % 0 %   Wound Bed Slough (%) 0 % 0 %   Wound Bed Eschar (%) 0 % 0 %   Wound Bed Fibrin (%) 0 % 0 %   State of Healing Undermining;Hyperkeratosis;Non-healing Undermining;Hyperkeratosis;Fully granulated   Wound Odor Strong None   Undermining? Yes Yes   Number of Undermines 1 1   Undermine 1 Start Position 9 10   Undermine 1 End Position 6 6   Nunn Scale -- Grade 3       Active Orders   Date Order Priority Status Authorizing Provider   02/20/25 1503 Debridement Diabetic Ulcer Plantar;Left Foot Routine Active Maite Goddard,  [id number]   01/22/25 1626 OP Wound Dressing Routine Active Maite Goddard APRN     - Cleansing:    Cleanse with normal saline or wound cleanser     - Dressing:    Dry gauze     - Dressing:    Paper tape     - Additional Wound Dressing Information:    1/4 St Dakins moist to wet dressing     - Frequency:    Change dressing two times a day   12/17/24 1650 OP Wound Dressing Routine Active Maite Goddard APRN     - Cleansing:    Cleanse with Dakins     - Cleansing:    Cleanse with normal saline or wound cleanser     - Dressing:    Wet to dry     - Additional Wound Dressing Information:    All wounds: Zinc oxide to periwound, Dakin's moistened gauze into wound bed, dry gauze, tape. Offloading felt x 2 to left foot.     - Frequency:    Change dressing two times a day       Inactive Orders   Date Order Priority Status Authorizing Provider   01/08/25 1347 Debridement Diabetic Ulcer Plantar;Left Foot Routine Completed Maite Goddard APRN   12/17/24 1445 Debridement Diabetic Ulcer Plantar;Left Foot Routine Completed Maite Goddard APRN   12/12/24 1205 Debridement Diabetic Ulcer Plantar;Left Foot Routine Completed Maite Goddard APRN       Wound 12/12/24 2 Foot Left;Lateral;Plantar (Active)   Date First Assessed/Time First Assessed: 12/12/24 1102    Wound Number (Wound Clinic Only): 2  Primary Wound Type: Diabetic Ulcer  Location: Foot  Wound Location Orientation: Left;Lateral;Plantar      Assessments 12/12/2024 11:04 AM 2/20/2025  2:36 PM   Wound Image        Site Assessment Moist;Red;Granulation tissue Moist;Pink;Red;Yellow   Closure Not approximated Not approximated   Drainage Amount Moderate Large   Drainage Description Serosanguineous Serosanguineous;Brown   Treatments Cleansed;Saline;Wound ;Dakins;Shoe Cleansed;Saline;Wound ;Dakins;Shoe   Shoe Type Felt Felt   Dressing Gauze;Tape Gauze;Tape   Dressing Changed New Changed   Dressing Status Clean;Dry;Intact Clean;Dry;Intact   Wound Length (cm) 2.4 cm  0.8 cm   Wound Width (cm) 1.7 cm 1.1 cm   Wound Surface Area (cm^2) 4.08 cm^2 0.88 cm^2   Wound Depth (cm) 0.7 cm 0.5 cm   Wound Volume (cm^3) 2.856 cm^3 0.44 cm^3   Wound Healing % -- 85   Margins Well-defined edges;Not attached Well-defined edges;Not attached   Non-staged Wound Description Full thickness Full thickness   Marcy-wound Assessment Dry;Callous Callous;Hyperpigmented   Wound Granulation Tissue Red Red;Pink   Wound Bed Granulation (%) 100 % 100 %   Wound Bed Epithelium (%) 0 % 0 %   Wound Bed Slough (%) 0 % 0 %   Wound Bed Eschar (%) 0 % 0 %   Wound Bed Fibrin (%) 0 % 0 %   State of Healing Undermining;Hyperkeratosis;Non-healing Undermining;Hyperkeratosis;Non-healing   Wound Odor Strong None   Undermining? Yes Yes   Number of Undermines 1 1   Undermine 1 Start Position 7 12   Undermine 1 End Position 12 12   Nunn Scale -- Grade 2       Active Orders   Date Order Priority Status Authorizing Provider   02/20/25 1504 Debridement Diabetic Ulcer Left;Lateral;Plantar Foot Routine Active Maite Goddard APRN   01/22/25 1626 OP Wound Dressing Routine Active Maite Goddard APRN     - Cleansing:    Cleanse with normal saline or wound cleanser     - Dressing:    Dry gauze     - Dressing:    Paper tape     - Additional Wound Dressing Information:    1/4 St Dakins moist to wet dressing     - Frequency:    Change dressing two times a day   12/17/24 1650 OP Wound Dressing Routine Active Maite Goddard APRN     - Cleansing:    Cleanse with Dakins     - Cleansing:    Cleanse with normal saline or wound cleanser     - Dressing:    Wet to dry     - Additional Wound Dressing Information:    All wounds: Zinc oxide to periwound, Dakin's moistened gauze into wound bed, dry gauze, tape. Offloading felt x 2 to left foot.     - Frequency:    Change dressing two times a day       Inactive Orders   Date Order Priority Status Authorizing Provider   01/08/25 1348 Debridement Diabetic Ulcer Left;Lateral;Plantar Foot Routine  Completed Maite Goddard APRN   12/17/24 1457 Debridement Diabetic Ulcer Left;Lateral;Plantar Foot Routine Completed Maite Goddard APRN   12/12/24 1207 Debridement Diabetic Ulcer Left;Lateral;Plantar Foot Routine Completed Maite Goddard APRN       Wound 12/12/24 3 Foot Right;Lateral (Active)   Date First Assessed/Time First Assessed: 12/12/24 1122    Wound Number (Wound Clinic Only): 3  Primary Wound Type: Diabetic Ulcer  Location: Foot  Wound Location Orientation: Right;Lateral      Assessments 12/12/2024 11:04 AM 2/20/2025  2:36 PM   Wound Image        Site Assessment Dry;Brown Dry;Fragile;Tan   Closure -- Approximated   Drainage Amount None None   Drainage Description Scabbed --   Treatments Cleansed;Wound ;Topical (Barrier/Moisturizer/Ointment);Dakins Cleansed;Vashe   Dressing Gauze;Tape Open to air   Dressing Changed New --   Dressing Status Clean;Dry;Intact Removed   Wound Length (cm) 8.6 cm 0 cm   Wound Width (cm) 5 cm 0 cm   Wound Surface Area (cm^2) 43 cm^2 0 cm^2   Wound Depth (cm) 0.1 cm 0 cm   Wound Volume (cm^3) 4.3 cm^3 0 cm^3   Wound Healing % -- 100   Margins Poorly defined Poorly defined   Non-staged Wound Description Not applicable --   Marcy-wound Assessment Dry;Callous Callous;Dark edges;Dry;Intact;Hyperpigmented   Wound Bed Granulation (%) 0 % 0 %   Wound Bed Epithelium (%) 0 % 100 %   Wound Bed Slough (%) 0 % 0 %   Wound Bed Eschar (%) 0 % 0 %   Wound Bed Fibrin (%) 100 % 0 %   State of Healing Non-healing;Hyperkeratosis Closed wound edges   Wound Odor Strong None   Shape Cluster of 2 --       Active Orders   Date Order Priority Status Authorizing Provider   01/22/25 1626 OP Wound Dressing Routine Active Maite Goddard APRN     - Cleansing:    Cleanse with normal saline or wound cleanser     - Dressing:    Dry gauze     - Dressing:    Paper tape     - Additional Wound Dressing Information:    1/4 St Dakins moist to wet dressing     - Frequency:    Change dressing two times a  day   12/17/24 1650 OP Wound Dressing Routine Active Maite Goddard APRN     - Cleansing:    Cleanse with Dakins     - Cleansing:    Cleanse with normal saline or wound cleanser     - Dressing:    Wet to dry     - Additional Wound Dressing Information:    All wounds: Zinc oxide to periwound, Dakin's moistened gauze into wound bed, dry gauze, tape. Offloading felt x 2 to left foot.     - Frequency:    Change dressing two times a day       Inactive Orders   Date Order Priority Status Authorizing Provider   01/22/25 1630 Debridement Diabetic Ulcer Right;Lateral Foot Routine Completed Maite Goddard APRN   12/12/24 1208 Debridement Diabetic Ulcer Right;Lateral Foot Routine Completed Maite Goddard APRN     Procedures  ASSESSMENT AND PLAN:      1. Diabetic ulcer of left midfoot associated with type 2 diabetes mellitus, limited to breakdown of skin ([facility])    2. Osteomyelitis of left foot, unspecified type ([facility])    3. Peripheral arterial disease with history of revascularization    4. Tobacco dependence with current use    5. Uncontrolled type 2 diabetes mellitus with hyperglycemia, with long-term current use of insulin ([facility])    F/u b/l DFUs. S/p L TMA 11/28/21 and right 5th ray amputation 10/2022. Wounds have been present for >1 year. He has PAD with hx of revascularizations, most recently on LLE 2/5/25 with Dr. Najjar--angioplasty of left post tib and left peroneal arteries. Pt has been seen by Dr. Holliday, last visit 1/20/25--to f/u after imaging completed--completed MRI b/l feet 2/16/25, mild early OM of left medial cuneiform.  Right foot: Thick, firm callus on right lateral foot. No visible open wound.    Left Foot: Wound measurements improved since last visit. Wound beds are granular. Periwound w/callus. No local or systemic s/sx of infection. Wounds selectively debrided.      Wound care: Dakin's 1/4 strength wet to moist gauze dressing BID, felt offloading pads.   -Referred to [facility] for finding of left foot  OM  -Advised to schedule f/u with Dr. Holliday  -Provided phone numbers for pt to call for appts    Hindering factors for wound healing include uncontrolled T2DM, tobacco use, PAD and peripheral neuropathy.  Continue to monitor for signs and symptoms of infection, encouraged him to assess skin daily, seek immediate care if he develops any s/sx of infection.  Continue to work on complete smoking cessation along with diabetes control, discussed impact on wound healing. Would benefit from diabetes education.  Continue nutrition for wound healing -- reduced carbohydrate intake, increased protein intake, and healthy diet. Recommended increased vitamin intake (either with foods or vitamin supplements), need vitamin A, Bs, C, D and zinc for wound healing.   Risks, benefits, and alternatives of current treatment plan discussed in detail.  Questions and concerns addressed. Red flags to RTC or ED reviewed.  Patient agrees to plan.      Return in about 2 weeks (around 3/6/2025) for APN x 30 minutes.    I spent 30 minutes with the patient. This time included:  preparing to see the patient (eg, review notes and recent diagnostics), seeing the patient, performing a medically appropriate examination and/or evaluation, counseling and educating the patient, and documenting in the record.    NOTE TO PATIENT: The 21st Century Cures Act makes clinical notes like these available to patients in the interest of transparency. Clinical notes are medical documents used by physicians and care providers to communicate with each other. These documents include medical language and terminology, abbreviations, and treatment information that may sound technical and at times possibly unfamiliar. In addition, at times, the verbiage may appear blunt or direct. These documents are one tool providers use to communicate relevant information and clinical opinions of the care providers in a way that allows common understanding of the clinical context.

## 2025-03-04 ENCOUNTER — OFFICE VISIT (OUTPATIENT)
Dept: WOUND CARE | Facility: HOSPITAL | Age: 60
End: 2025-03-04
Attending: NURSE PRACTITIONER
Payer: MEDICARE

## 2025-03-04 VITALS
DIASTOLIC BLOOD PRESSURE: 61 MMHG | TEMPERATURE: 99 F | HEART RATE: 67 BPM | SYSTOLIC BLOOD PRESSURE: 106 MMHG | OXYGEN SATURATION: 95 % | RESPIRATION RATE: 18 BRPM

## 2025-03-04 DIAGNOSIS — F17.200 TOBACCO DEPENDENCE WITH CURRENT USE: ICD-10-CM

## 2025-03-04 DIAGNOSIS — Z98.890 PERIPHERAL ARTERIAL DISEASE WITH HISTORY OF REVASCULARIZATION: ICD-10-CM

## 2025-03-04 DIAGNOSIS — M86.9 OSTEOMYELITIS OF LEFT FOOT, UNSPECIFIED TYPE (HCC): ICD-10-CM

## 2025-03-04 DIAGNOSIS — L97.426 DIABETIC ULCER OF LEFT MIDFOOT ASSOCIATED WITH TYPE 2 DIABETES MELLITUS, WITH BONE INVOLVEMENT WITHOUT EVIDENCE OF NECROSIS (HCC): Primary | ICD-10-CM

## 2025-03-04 DIAGNOSIS — E11.621 DIABETIC ULCER OF LEFT MIDFOOT ASSOCIATED WITH TYPE 2 DIABETES MELLITUS, WITH BONE INVOLVEMENT WITHOUT EVIDENCE OF NECROSIS (HCC): Primary | ICD-10-CM

## 2025-03-04 DIAGNOSIS — I73.9 PERIPHERAL ARTERIAL DISEASE WITH HISTORY OF REVASCULARIZATION: ICD-10-CM

## 2025-03-04 PROCEDURE — 97597 DBRDMT OPN WND 1ST 20 CM/<: CPT | Performed by: NURSE PRACTITIONER

## 2025-03-04 NOTE — PROGRESS NOTES
Patient ID: Yo Villagomez is a 59 year old male.    Debridement Diabetic Ulcer Plantar;Left Foot   Wound 12/12/24 1 Foot Plantar;Left    Performed by: Maite Goddard APRN  Authorized by: Maite Goddard APRN      Consent   Consent obtained? verbal  Consent given by: patient  Risks discussed? procedural risks discussed  Time out called at 3/4/2025 1:50 PM  Immediately prior to the procedure a time out was called and the performing provider verified the correct patient, procedure, equipment, support staff, and site/side marked as required.    Debridement Details  Performed by: NP  Debridement type: conservative sharp  Pain control: none    Pre-debridement measurements  Length (cm): 1  Width (cm): 1.5  Depth (cm): 0.3  Surface Area (cm^2): 1.5    Post-debridement measurements  Length (cm): 1.8  Width (cm): 1.5  Depth (cm): 0.4  Percent debrided: 100%  Surface Area (cm^2): 2.7  Area Debrided (cm^2): 2.7  Volume (cm^3): 1.08    Devitalized tissue debrided: biofilm and callus  Instrument(s) utilized: blade and forceps  Bleeding: none  Hemostasis obtained with: not applicable  Procedural pain (0-10): 0  Post-procedural pain: 0   Response to treatment: procedure was tolerated well    Debridement Diabetic Ulcer Left;Lateral;Plantar Foot   Wound 12/12/24 2 Foot Left;Lateral;Plantar    Performed by: Maite Goddard APRN  Authorized by: Maite Goddard APRN      Consent   Consent obtained? verbal  Consent given by: patient  Risks discussed? procedural risks discussed  Time out called at 3/4/2025 2:00 PM  Immediately prior to the procedure a time out was called and the performing provider verified the correct patient, procedure, equipment, support staff, and site/side marked as required.    Debridement Details  Performed by: NP  Debridement type: conservative sharp  Pain control: none    Pre-debridement measurements  Length (cm): 1.5  Width (cm): 1.1  Depth (cm): 0.3  Surface Area (cm^2): 1.65    Post-debridement  measurements  Length (cm): 1.5  Width (cm): 1.2  Depth (cm): 0.3  Percent debrided: 100%  Surface Area (cm^2): 1.8  Area Debrided (cm^2): 1.8  Volume (cm^3): 0.54    Devitalized tissue debrided: callus  Instrument(s) utilized: blade and forceps  Bleeding: none  Hemostasis obtained with: not applicable  Procedural pain (0-10): 0  Post-procedural pain: 0   Response to treatment: procedure was tolerated well

## 2025-03-04 NOTE — PROGRESS NOTES
Chief Complaint   Patient presents with    Wound Recheck     left foot wounds     HPI:   3/4/25: F/u L foot wounds. MRI w/early mild OM left medial cuneiform. Saw ID this AM, starting IV abx x 6 weeks. PICC line to be placed in 2 days. Has appt with podiatry next week. Says he has been feeling well. Taking medication for diabetes. Smoking about the same amount, but trying to cut back.     2/20/25: F/u b/l foot wounds. S/p angioplasty of left post tib and left peroneal artery 2/5/25 (Dr. Najjar). Has continued to apply 1/4 st dakin's gauze to the wound sites on his left foot. Completed MRI a few days ago. Says he has been feeling well. No new concerns.    1/22/25: F/u b/l foot wounds. Saw Dr. Holliday 1/20/25. Wounds were debrided. Pt has arterial US scheduled tomorrow and MRI 2/16/25.     1/8/25: F/u b/l foot wounds. No drainage from the right foot. Has been applying Dakins dressing BID since last visit. Hasn't scheduled vascular testing or MRI yet. Says he is feeling well, denies change in health since last visit.    12/17/24: F/u visit for b/l foot wounds. Saw Dr. Najjar prior to this appointment. Referred for b/l arterial duplex and carotid dopplers. Pt also has upcoming appointment with Dr. Holliday on 1/20/25. Pt reports he started using the Dakin's dressing a few days ago.     12/12/24: Pt returns to wound care, was lost to follow up over past 6 months. Says he has been cleaning the wounds and keeping them covered. Wants to get back on track with health. He was recently started on insulin by his PCP. He doesn't check his BG regularly. Does his best to minimize weight bearing. Has appt to see vascular surgery next week. PCP has also referred to podiatry, no appt yet. Says he still smokes cigarettes, trying to cut back. Drinks wine once a week and denies recreational drug use at this time.     6/11/24: F/u left DFU. Saw his PCP last week, says he started on insulin. Received podiatry referral as well, has not  scheduled appointment. Did not receive vascular surgery referral. Reports he has been doing well with dressing changes BID for the past week. Reports the swelling in his leg occurs if he is up and about for 3 hours, resolves when he sits and elevates legs. He denies fever, chill, pain. Says Denies new concerns.    6/5/24: F/u left DFU. Had difficulty filling Rx for Dakin's. Says he picked up at a specialty pharmacy yesterday, 1/2 strength instead of 1/4 strength. Reports he has been doing well, denies change in health since last visit. Has a follow up visit with his PCP tomorrow, will request updated referrals for [facility] and vascular surgery.     5/23/24: F/u visit for left foot wounds. Didn't  Dakin's solution after last visit and hasn't received gauze and tape from [facility], therefore, says he has continued to apply betadine and gauze to wounds. Denies change in health since last visit, no new concerns.    5/16/24: 59 yr old male here today for chronic wound on left foot. His current medical problems include uncontrolled T2DM (A1c 15.5% on 5/13/24), diabetic neuropathy, tobacco use (1/2 PPD), HLD, PAD. 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 He underwent RLE angiogram with intervention 2/20/23. He reports he had been seeing a podiatrist regularly until his insurance changed this year. He noticed wounds developed on his left foot about 4 months ago. He  has been applying betadine and covering with gauze almost every day. He denies drainage from the right foot and has noticed the skin changes over the past year as well. He has chronic intermittent pain in both feet for which he reports he previously received [place] from his podiatrist. He used to wear a boot to offload wounds, no longer has it. He tries to walk on his heel as much as possible. PCP ordered a cane, but is having a hard time getting from DME company. He lives alone, says he stays with his mother  often. He has cut back on tobacco use from 1 PPD to 1/2 PPD. He denies recent alcohol use and denies recent illicit substance use.     Lab Results   Component Value Date    BUN 17 01/31/2025    CREATSERUM 1.25 01/31/2025    ALB 4.0 05/13/2024    TP 7.8 05/13/2024    A1C 9.4 (H) 11/12/2024         Current Outpatient Medications:     ibuprofen 600 MG Oral Tab, Take 1 tablet (600 mg total) by mouth every 8 (eight) hours as needed., Disp: , Rfl:     aspirin 81 MG Oral Tab EC, Take 1 tablet (81 mg total) by mouth daily., Disp: 90 tablet, Rfl: 3    clopidogrel 75 MG Oral Tab, Take 1 tablet (75 mg total) by mouth nightly., Disp: 90 tablet, Rfl: 3    empagliflozin (JARDIANCE) 10 MG Oral Tab, Take 1 tablet (10 mg total) by mouth daily. (Patient not taking: Reported on 1/23/2025), Disp: 90 tablet, Rfl: 3    glipiZIDE 10 MG Oral Tab, Take 1 tablet (10 mg total) by mouth 2 (two) times daily before meals., Disp: 180 tablet, Rfl: 3    Glucose Blood (ONETOUCH VERIO) In Vitro Strip, Check glucose three times daily E11.65, Disp: 100 strip, Rfl: 1    insulin degludec (TRESIBA FLEXTOUCH) 200 UNIT/ML Subcutaneous Solution Pen-injector, Inject 20 Units into the skin nightly., Disp: 9 mL, Rfl: 3    Insulin Pen Needle (BD PEN NEEDLE HAL 2ND GEN) 32G X 4 MM Does not apply Misc, 1 NEEDLE AT BEDTIME. USE WITH TRESIBA NIGHTLY E11.65, Disp: 100 each, Rfl: 3    metFORMIN HCl 1000 MG Oral Tab, Take 1 tablet (1,000 mg total) by mouth 2 (two) times daily., Disp: 90 tablet, Rfl: 3    OneTouch UltraSoft 2 Lancets Does not apply Misc, 1 Lancet in the morning, at noon, and at bedtime. Check glucose three times daily E11.65, Disp: 100 each, Rfl: 3    simvastatin 20 MG Oral Tab, Take 1 tablet (20 mg total) by mouth daily. (Patient taking differently: Take 1 tablet (20 mg total) by mouth nightly.), Disp: 90 tablet, Rfl: 3    Blood Glucose Monitoring Suppl (ONETOUCH ULTRA 2) w/Device Does not apply Kit, Check glucose three times daily E11.65, Disp: 1  kit, Rfl: 0    Continuous Glucose Sensor (FREESTYLE BRIDGER 2 SENSOR) Does not apply Misc, Inject 1 Device into the skin every 14 (fourteen) days., Disp: , Rfl:     Allergies   Allergen Reactions    Ace Inhibitors UNKNOWN      REVIEW OF SYSTEMS:   Review of Systems   Constitutional:  Negative for activity change, chills and fever.   Respiratory:  Negative for shortness of breath.    Cardiovascular:  Negative for chest pain and leg swelling.   Gastrointestinal:  Negative for abdominal pain, diarrhea and vomiting.   Musculoskeletal:  Positive for arthralgias and gait problem.   Skin:  Positive for wound. Negative for rash.   Neurological:  Negative for dizziness, numbness and headaches.      PHYSICAL EXAM:   /61   Pulse 67   Temp 99.1 °F (37.3 °C)   Resp 18   SpO2 95%    Estimated body mass index is 29.84 kg/m² as calculated from the following:    Height as of 1/28/25: 72\".    Weight as of 1/28/25: 220 lb.   Vital signs reviewed.Appears stated age, well groomed.    Physical Exam  Constitutional:       General: He is not in acute distress.     Appearance: Normal appearance. He is obese. He is not ill-appearing.   HENT:      Head: Normocephalic and atraumatic.   Cardiovascular:      Pulses:           Dorsalis pedis pulses are 2+ on the right side and 1+ on the left side.        Posterior tibial pulses are 2+ on the right side and 1+ on the left side.   Pulmonary:      Effort: Pulmonary effort is normal.   Musculoskeletal:      Right foot: Deformity present.      Left foot: Deformity present.      Right Lower Extremity: Right leg is amputated below ankle. (Right 5th toe)     Left Lower Extremity: Left leg is amputated below ankle. (TMA)  Feet:      Right foot:      Skin integrity: Callus and dry skin present. No ulcer, erythema or warmth.      Toenail Condition: Right toenails are abnormally thick.      Left foot:      Skin integrity: Ulcer, callus and dry skin present. No erythema or warmth.   Skin:     General:  Skin is warm and dry.      Capillary Refill: Capillary refill takes 2 to 3 seconds.      Findings: Wound (left foot) present.   Neurological:      General: No focal deficit present.      Mental Status: He is alert and oriented to person, place, and time.   Psychiatric:         Mood and Affect: Mood normal.         Behavior: Behavior normal.        Wound 12/12/24 1 Foot Plantar;Left (Active)   Date First Assessed/Time First Assessed: 12/12/24 1101    Wound Number (Wound Clinic Only): 1  Primary Wound Type: Diabetic Ulcer  Location: Foot  Wound Location Orientation: Plantar;Left      Assessments 12/12/2024 11:04 AM 3/4/2025  1:45 PM   Wound Image        Site Assessment Moist;Red;Granulation tissue Moist;Pink;Red;White   Closure Not approximated Not approximated   Drainage Amount Moderate Moderate   Drainage Description Serosanguineous Serosanguineous   Treatments Cleansed;Saline;Wound ;Dakins;Shoe Cleansed;Saline;Wound ;Dakins   Shoe Type Felt Felt   Dressing Gauze;Tape 4x4s;Gauze;Kerlix roll;Tape   Dressing Changed New Changed   Dressing Status Clean;Dry;Intact Clean;Dry;Intact;Dressing Changed   Wound Length (cm) 2.3 cm 1 cm   Wound Width (cm) 2.3 cm 1.5 cm   Wound Surface Area (cm^2) 5.29 cm^2 1.5 cm^2   Wound Depth (cm) 1 cm 0.3 cm   Wound Volume (cm^3) 5.29 cm^3 0.45 cm^3   Wound Healing % -- 91   Margins Well-defined edges;Not attached Well-defined edges;Attached edges   Non-staged Wound Description Full thickness Full thickness   Marcy-wound Assessment Dry;Callous Dry;Callous;Fragile;Edema   Wound Granulation Tissue Red Red   Wound Bed Granulation (%) 100 % 100 %   Wound Bed Epithelium (%) 0 % 0 %   Wound Bed Slough (%) 0 % 0 %   Wound Bed Eschar (%) 0 % 0 %   Wound Bed Fibrin (%) 0 % 0 %   State of Healing Undermining;Hyperkeratosis;Non-healing Early/partial granulation   Wound Odor Strong None   Tunneling? -- No   Undermining? Yes No   Number of Undermines 1 --   Undermine 1 Start Position 9 --    Undermine 1 End Position 6 --   Sinus Tracts? -- No   Nunn Scale -- Grade 3       Active Orders   Date Order Priority Status Authorizing Provider   03/04/25 1503 OP Wound Dressing Routine Active Maite Goddard APRN     - Cleansing:    Cleanse with normal saline or wound cleanser     - Dressing:    Dry gauze     - Dressing:    Kerlix     - Dressing:    Paper tape     - Additional Wound Dressing Information:    1/4 ST Damp Dakins gauze,  1 offloading pad     - Frequency:    Change dressing daily and PRN   03/04/25 1358 Debridement Diabetic Ulcer Plantar;Left Foot Routine Active Maite Goddard APRN   01/22/25 1626 OP Wound Dressing Routine Active Maite Goddard APRN     - Cleansing:    Cleanse with normal saline or wound cleanser     - Dressing:    Dry gauze     - Dressing:    Paper tape     - Additional Wound Dressing Information:    1/4 St Dakins moist to wet dressing     - Frequency:    Change dressing two times a day   12/17/24 1650 OP Wound Dressing Routine Active Maite Goddard APRN     - Cleansing:    Cleanse with Dakins     - Cleansing:    Cleanse with normal saline or wound cleanser     - Dressing:    Wet to dry     - Additional Wound Dressing Information:    All wounds: Zinc oxide to periwound, Dakin's moistened gauze into wound bed, dry gauze, tape. Offloading felt x 2 to left foot.     - Frequency:    Change dressing two times a day       Inactive Orders   Date Order Priority Status Authorizing Provider   02/20/25 1503 Debridement Diabetic Ulcer Plantar;Left Foot Routine Completed Maite Goddard APRN   01/08/25 1347 Debridement Diabetic Ulcer Plantar;Left Foot Routine Completed Maite Goddard APRN   12/17/24 1445 Debridement Diabetic Ulcer Plantar;Left Foot Routine Completed Maite Goddard APRN   12/12/24 1205 Debridement Diabetic Ulcer Plantar;Left Foot Routine Completed Maite Goddard APRN       Wound 12/12/24 2 Foot Left;Lateral;Plantar (Active)   Date First Assessed/Time  First Assessed: 12/12/24 1102    Wound Number (Wound Clinic Only): 2  Primary Wound Type: Diabetic Ulcer  Location: Foot  Wound Location Orientation: Left;Lateral;Plantar      Assessments 12/12/2024 11:04 AM 3/4/2025  1:45 PM   Wound Image        Site Assessment Moist;Red;Granulation tissue Moist;Pink;Red;Yellow   Closure Not approximated Not approximated   Drainage Amount Moderate Moderate   Drainage Description Serosanguineous Serosanguineous   Treatments Cleansed;Saline;Wound ;Dakins;Shoe Cleansed;Saline;Wound ;Dakins;Shoe   Shoe Type Felt Felt   Dressing Gauze;Tape 4x4s;Gauze;Kerlix roll;Tape   Dressing Changed New Changed   Dressing Status Clean;Dry;Intact Clean;Dry;Intact;Dressing Changed   Wound Length (cm) 2.4 cm 1.5 cm   Wound Width (cm) 1.7 cm 1.1 cm   Wound Surface Area (cm^2) 4.08 cm^2 1.65 cm^2   Wound Depth (cm) 0.7 cm 0.3 cm   Wound Volume (cm^3) 2.856 cm^3 0.495 cm^3   Wound Healing % -- 83   Margins Well-defined edges;Not attached Well-defined edges;Not attached   Non-staged Wound Description Full thickness Full thickness   Marcy-wound Assessment Dry;Callous Callous;Hyperpigmented   Wound Granulation Tissue Red Pink;Red   Wound Bed Granulation (%) 100 % --   Wound Bed Epithelium (%) 0 % 0 %   Wound Bed Slough (%) 0 % --   Wound Bed Eschar (%) 0 % 0 %   Wound Bed Fibrin (%) 0 % 0 %   State of Healing Undermining;Hyperkeratosis;Non-healing Undermining;Hyperkeratosis;Non-healing   Wound Odor Strong None   Tunneling? -- No   Undermining? Yes Yes   Number of Undermines 1 1   Undermine 1 Start Position 7 12   Undermine 1 End Position 12 12   Sinus Tracts? -- No   Nunn Scale -- Grade 2       Active Orders   Date Order Priority Status Authorizing Provider   03/04/25 1503 OP Wound Dressing Routine Active Maite Goddard APRN     - Cleansing:    Cleanse with normal saline or wound cleanser     - Dressing:    Dry gauze     - Dressing:    Kerlix     - Dressing:    Paper tape     - Additional  Wound Dressing Information:    1/4 ST Damp Dakins gauze,  1 offloading pad     - Frequency:    Change dressing daily and PRN   03/04/25 1418 Debridement Diabetic Ulcer Left;Lateral;Plantar Foot Routine Active Maite Goddard APRN   01/22/25 1626 OP Wound Dressing Routine Active Maite Goddard APRN     - Cleansing:    Cleanse with normal saline or wound cleanser     - Dressing:    Dry gauze     - Dressing:    Paper tape     - Additional Wound Dressing Information:    1/4 St Dakins moist to wet dressing     - Frequency:    Change dressing two times a day   12/17/24 1650 OP Wound Dressing Routine Active Maite Goddard APRN     - Cleansing:    Cleanse with Dakins     - Cleansing:    Cleanse with normal saline or wound cleanser     - Dressing:    Wet to dry     - Additional Wound Dressing Information:    All wounds: Zinc oxide to periwound, Dakin's moistened gauze into wound bed, dry gauze, tape. Offloading felt x 2 to left foot.     - Frequency:    Change dressing two times a day       Inactive Orders   Date Order Priority Status Authorizing Provider   02/20/25 1504 Debridement Diabetic Ulcer Left;Lateral;Plantar Foot Routine Completed Maite Goddard APRN   01/08/25 1348 Debridement Diabetic Ulcer Left;Lateral;Plantar Foot Routine Completed Maite Goddard APRN   12/17/24 1457 Debridement Diabetic Ulcer Left;Lateral;Plantar Foot Routine Completed Maite Goddard APRN   12/12/24 1207 Debridement Diabetic Ulcer Left;Lateral;Plantar Foot Routine Completed Maite Goddard APRN     Procedures  ASSESSMENT AND PLAN:      1. Diabetic ulcer of left midfoot associated with type 2 diabetes mellitus, with bone involvement without evidence of necrosis (HCC)  - OP Wound Dressing; Standing    2. Osteomyelitis of left foot, unspecified type (HCC)  - OP Wound Dressing; Standing    3. Peripheral arterial disease with history of revascularization    4. Tobacco dependence with current use    F/u b/l DFUs. S/p L TMA  11/28/21 and right 5th ray amputation 10/2022. Wounds have been present for >1 year. He has PAD with hx of revascularizations, most recently on LLE 2/5/25 with Dr. Najjar--angioplasty of left post tib and left peroneal arteries. MRI b/l feet 2/16/25, mild early OM of left medial cuneiform. Saw ID this AM, starting IV abx x 6 weeks.   Left Foot: Wound measurements continue to improve. Wound beds are granular. Periwound w/callus. No local or systemic s/sx of infection. Wounds selectively debrided.      Wound care: Continue Dakin's 1/4 strength wet to moist gauze dressing 1-2x/day, felt offloading pads.   -Starting IV abx this week  -F/u with Dr. oHlliday as scheduled next week    Hindering factors for wound healing include uncontrolled T2DM, tobacco use, PAD and peripheral neuropathy.  Continue to monitor for signs and symptoms of infection, encouraged him to assess skin daily, seek immediate care if he develops any s/sx of infection.  Continue to work on complete smoking cessation along with diabetes control, discussed impact on wound healing. Would benefit from diabetes education.  Continue nutrition for wound healing -- reduced carbohydrate intake, increased protein intake, and healthy diet. Recommended increased vitamin intake (either with foods or vitamin supplements), need vitamin A, Bs, C, D and zinc for wound healing.   Risks, benefits, and alternatives of current treatment plan discussed in detail.  Questions and concerns addressed. Red flags to RTC or ED reviewed.  Patient agrees to plan.      Return in about 2 weeks (around 3/18/2025) for APN x 30 min.    I spent 30 minutes with the patient. This time included:  preparing to see the patient (eg, review notes and recent diagnostics), seeing the patient, performing a medically appropriate examination and/or evaluation, counseling and educating the patient, and documenting in the record.    NOTE TO PATIENT: The 21st Century Cures Act makes clinical notes like  these available to patients in the interest of transparency. Clinical notes are medical documents used by physicians and care providers to communicate with each other. These documents include medical language and terminology, abbreviations, and treatment information that may sound technical and at times possibly unfamiliar. In addition, at times, the verbiage may appear blunt or direct. These documents are one tool providers use to communicate relevant information and clinical opinions of the care providers in a way that allows common understanding of the clinical context.

## 2025-03-07 DIAGNOSIS — E11.65 TYPE 2 DIABETES MELLITUS WITH HYPERGLYCEMIA, WITHOUT LONG-TERM CURRENT USE OF INSULIN (HCC): ICD-10-CM

## 2025-03-07 NOTE — TELEPHONE ENCOUNTER
Spoke with patient, Date of Birth verified  He req rx ref for pen needles.  Routed to [facility] Ref[facility] to run for protocol , thanks.       Requested Prescriptions     Pending Prescriptions Disp Refills    Insulin Pen Needle (BD PEN NEEDLE HAL 2ND GEN) 32G X 4 MM Does not apply Misc 100 each 3     Si NEEDLE AT BEDTIME. USE WITH TRESIBA NIGHTLY E11.65       Last Office Visit with PCP: 2024

## 2025-03-10 ENCOUNTER — TELEPHONE (OUTPATIENT)
Dept: PODIATRY CLINIC | Facility: CLINIC | Age: 60
End: 2025-03-10

## 2025-03-10 DIAGNOSIS — E08.621 DIABETIC ULCER OF TOE OF RIGHT FOOT ASSOCIATED WITH DIABETES MELLITUS DUE TO UNDERLYING CONDITION, LIMITED TO BREAKDOWN OF SKIN (HCC): Primary | ICD-10-CM

## 2025-03-10 DIAGNOSIS — M79.672 LEFT FOOT PAIN: ICD-10-CM

## 2025-03-10 DIAGNOSIS — L97.511 DIABETIC ULCER OF TOE OF RIGHT FOOT ASSOCIATED WITH DIABETES MELLITUS DUE TO UNDERLYING CONDITION, LIMITED TO BREAKDOWN OF SKIN (HCC): Primary | ICD-10-CM

## 2025-03-11 RX ORDER — PEN NEEDLE, DIABETIC 32GX 5/32"
NEEDLE, DISPOSABLE MISCELLANEOUS
Qty: 100 EACH | Refills: 3 | OUTPATIENT
Start: 2025-03-11

## 2025-03-11 NOTE — TELEPHONE ENCOUNTER
Disp Refills Start End    Insulin Pen Needle (BD PEN NEEDLE HAL 2ND GEN) 32G X 4 MM Does not apply Misc 100 each 3 2024 --    Si NEEDLE AT BEDTIME. USE WITH TRESIBA NIGHTLY E11.65    Sent to pharmacy as: BD Pen Needle Hal 2nd Gen 32G X 4 MM (Insulin Pen Needle)    Notes to Pharmacy: DX code E11.65 is not insulin dependent. Please dispense appropriate supplies as covered by patients insurance.    E-Prescribing Status: Receipt confirmed by pharmacy (2024  5:34 PM CST)      Associated Diagnoses    Type 2 diabetes mellitus with hyperglycemia, without long-term current use of insulin ([facility])        Pharmacy    [id number] - [place], [place] - [address], [phone], [phone]

## 2025-03-18 ENCOUNTER — APPOINTMENT (OUTPATIENT)
Dept: WOUND CARE | Facility: HOSPITAL | Age: 60
End: 2025-03-18
Attending: NURSE PRACTITIONER
Payer: MEDICARE

## 2025-03-18 NOTE — PROGRESS NOTES
No chief complaint on file.    HPI:   3/18/25: F/u L foot wounds. ***     3/4/25: F/u L foot wounds. MRI w/early mild OM left medial cuneiform. Saw ID this AM, starting IV abx x 6 weeks. PICC line to be placed in 2 days. Has appt with podiatry next week. Says he has been feeling well. Taking medication for diabetes. Smoking about the same amount, but trying to cut back.     2/20/25: F/u b/l foot wounds. S/p angioplasty of left post tib and left peroneal artery 2/5/25 (Dr. Najjar). Has continued to apply 1/4 st dakin's gauze to the wound sites on his left foot. Completed MRI a few days ago. Says he has been feeling well. No new concerns.    1/22/25: F/u b/l foot wounds. Saw Dr. Holliday 1/20/25. Wounds were debrided. Pt has arterial US scheduled tomorrow and MRI 2/16/25.     1/8/25: F/u b/l foot wounds. No drainage from the right foot. Has been applying Dakins dressing BID since last visit. Hasn't scheduled vascular testing or MRI yet. Says he is feeling well, denies change in health since last visit.    12/17/24: F/u visit for b/l foot wounds. Saw Dr. Najjar prior to this appointment. Referred for b/l arterial duplex and carotid dopplers. Pt also has upcoming appointment with Dr. Holliday on 1/20/25. Pt reports he started using the Dakin's dressing a few days ago.     12/12/24: Pt returns to wound care, was lost to follow up over past 6 months. Says he has been cleaning the wounds and keeping them covered. Wants to get back on track with health. He was recently started on insulin by his PCP. He doesn't check his BG regularly. Does his best to minimize weight bearing. Has appt to see vascular surgery next week. PCP has also referred to podiatry, no appt yet. Says he still smokes cigarettes, trying to cut back. Drinks wine once a week and denies recreational drug use at this time.     6/11/24: F/u left DFU. Saw his PCP last week, says he started on insulin. Received podiatry referral as well, has not scheduled  appointment. Did not receive vascular surgery referral. Reports he has been doing well with dressing changes BID for the past week. Reports the swelling in his leg occurs if he is up and about for 3 hours, resolves when he sits and elevates legs. He denies fever, chill, pain. Says Denies new concerns.    6/5/24: F/u left DFU. Had difficulty filling Rx for Dakin's. Says he picked up at a specialty pharmacy yesterday, 1/2 strength instead of 1/4 strength. Reports he has been doing well, denies change in health since last visit. Has a follow up visit with his PCP tomorrow, will request updated referrals for [facility] and vascular surgery.     5/23/24: F/u visit for left foot wounds. Didn't  Dakin's solution after last visit and hasn't received gauze and tape from [facility], therefore, says he has continued to apply betadine and gauze to wounds. Denies change in health since last visit, no new concerns.    5/16/24: 59 yr old male here today for chronic wound on left foot. His current medical problems include uncontrolled T2DM (A1c 15.5% on 5/13/24), diabetic neuropathy, tobacco use (1/2 PPD), HLD, PAD. 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 He underwent RLE angiogram with intervention 2/20/23. He reports he had been seeing a podiatrist regularly until his insurance changed this year. He noticed wounds developed on his left foot about 4 months ago. He  has been applying betadine and covering with gauze almost every day. He denies drainage from the right foot and has noticed the skin changes over the past year as well. He has chronic intermittent pain in both feet for which he reports he previously received [place] from his podiatrist. He used to wear a boot to offload wounds, no longer has it. He tries to walk on his heel as much as possible. PCP ordered a cane, but is having a hard time getting from DME company. He lives alone, says he stays with his mother often. He has  cut back on tobacco use from 1 PPD to 1/2 PPD. He denies recent alcohol use and denies recent illicit substance use.     Lab Results   Component Value Date    BUN 17 01/31/2025    CREATSERUM 1.25 01/31/2025    ALB 4.0 05/13/2024    TP 7.8 05/13/2024    A1C 9.4 (H) 11/12/2024         Current Outpatient Medications:     ibuprofen 600 MG Oral Tab, Take 1 tablet (600 mg total) by mouth every 8 (eight) hours as needed., Disp: , Rfl:     aspirin 81 MG Oral Tab EC, Take 1 tablet (81 mg total) by mouth daily., Disp: 90 tablet, Rfl: 3    clopidogrel 75 MG Oral Tab, Take 1 tablet (75 mg total) by mouth nightly., Disp: 90 tablet, Rfl: 3    empagliflozin (JARDIANCE) 10 MG Oral Tab, Take 1 tablet (10 mg total) by mouth daily. (Patient not taking: Reported on 1/23/2025), Disp: 90 tablet, Rfl: 3    glipiZIDE 10 MG Oral Tab, Take 1 tablet (10 mg total) by mouth 2 (two) times daily before meals., Disp: 180 tablet, Rfl: 3    Glucose Blood (ONETOUCH VERIO) In Vitro Strip, Check glucose three times daily E11.65, Disp: 100 strip, Rfl: 1    insulin degludec (TRESIBA FLEXTOUCH) 200 UNIT/ML Subcutaneous Solution Pen-injector, Inject 20 Units into the skin nightly., Disp: 9 mL, Rfl: 3    Insulin Pen Needle (BD PEN NEEDLE HAL 2ND GEN) 32G X 4 MM Does not apply Misc, 1 NEEDLE AT BEDTIME. USE WITH TRESIBA NIGHTLY E11.65, Disp: 100 each, Rfl: 3    metFORMIN HCl 1000 MG Oral Tab, Take 1 tablet (1,000 mg total) by mouth 2 (two) times daily., Disp: 90 tablet, Rfl: 3    OneTouch UltraSoft 2 Lancets Does not apply Misc, 1 Lancet in the morning, at noon, and at bedtime. Check glucose three times daily E11.65, Disp: 100 each, Rfl: 3    simvastatin 20 MG Oral Tab, Take 1 tablet (20 mg total) by mouth daily. (Patient taking differently: Take 1 tablet (20 mg total) by mouth nightly.), Disp: 90 tablet, Rfl: 3    Blood Glucose Monitoring Suppl (ONETOUCH ULTRA 2) w/Device Does not apply Kit, Check glucose three times daily E11.65, Disp: 1 kit, Rfl: 0     Continuous Glucose Sensor (FREESTYLE BRIDGER 2 SENSOR) Does not apply Misc, Inject 1 Device into the skin every 14 (fourteen) days., Disp: , Rfl:     Allergies   Allergen Reactions    Ace Inhibitors UNKNOWN      REVIEW OF SYSTEMS:   Review of Systems   Constitutional:  Negative for activity change, chills and fever.   Respiratory:  Negative for shortness of breath.    Cardiovascular:  Negative for chest pain and leg swelling.   Gastrointestinal:  Negative for abdominal pain, diarrhea and vomiting.   Musculoskeletal:  Positive for arthralgias and gait problem.   Skin:  Positive for wound. Negative for rash.   Neurological:  Negative for dizziness, numbness and headaches.      PHYSICAL EXAM:   There were no vitals taken for this visit.   Estimated body mass index is 29.84 kg/m² as calculated from the following:    Height as of 1/28/25: 72\".    Weight as of 1/28/25: 220 lb.   Vital signs reviewed.Appears stated age, well groomed.    Physical Exam  Constitutional:       General: He is not in acute distress.     Appearance: Normal appearance. He is obese. He is not ill-appearing.   HENT:      Head: Normocephalic and atraumatic.   Cardiovascular:      Pulses:           Dorsalis pedis pulses are 2+ on the right side and 1+ on the left side.        Posterior tibial pulses are 2+ on the right side and 1+ on the left side.   Pulmonary:      Effort: Pulmonary effort is normal.   Musculoskeletal:      Right foot: Deformity present.      Left foot: Deformity present.      Right Lower Extremity: Right leg is amputated below ankle. (Right 5th toe)     Left Lower Extremity: Left leg is amputated below ankle. (TMA)  Feet:      Right foot:      Skin integrity: Callus and dry skin present. No ulcer, erythema or warmth.      Toenail Condition: Right toenails are abnormally thick.      Left foot:      Skin integrity: Ulcer, callus and dry skin present. No erythema or warmth.   Skin:     General: Skin is warm and dry.      Capillary  Refill: Capillary refill takes 2 to 3 seconds.      Findings: Wound (left foot) present.   Neurological:      General: No focal deficit present.      Mental Status: He is alert and oriented to person, place, and time.   Psychiatric:         Mood and Affect: Mood normal.         Behavior: Behavior normal.        Wound 12/12/24 1 Foot Plantar;Left (Active)   Date First Assessed/Time First Assessed: 12/12/24 1101    Wound Number (Wound Clinic Only): 1  Primary Wound Type: Diabetic Ulcer  Location: Foot  Wound Location Orientation: Plantar;Left      Assessments 12/12/2024 11:04 AM 3/4/2025  1:45 PM   Wound Image        Site Assessment Moist;Red;Granulation tissue Moist;Pink;Red;White   Closure Not approximated Not approximated   Drainage Amount Moderate Moderate   Drainage Description Serosanguineous Serosanguineous   Treatments Cleansed;Saline;Wound ;Dakins;Shoe Cleansed;Saline;Wound ;Dakins   Shoe Type Felt Felt   Dressing Gauze;Tape 4x4s;Gauze;Kerlix roll;Tape   Dressing Changed New Changed   Dressing Status Clean;Dry;Intact Clean;Dry;Intact;Dressing Changed   Wound Length (cm) 2.3 cm 1 cm   Wound Width (cm) 2.3 cm 1.5 cm   Wound Surface Area (cm^2) 5.29 cm^2 1.5 cm^2   Wound Depth (cm) 1 cm 0.3 cm   Wound Volume (cm^3) 5.29 cm^3 0.45 cm^3   Wound Healing % -- 91   Margins Well-defined edges;Not attached Well-defined edges;Attached edges   Non-staged Wound Description Full thickness Full thickness   Marcy-wound Assessment Dry;Callous Dry;Callous;Fragile;Edema   Wound Granulation Tissue Red Red   Wound Bed Granulation (%) 100 % 100 %   Wound Bed Epithelium (%) 0 % 0 %   Wound Bed Slough (%) 0 % 0 %   Wound Bed Eschar (%) 0 % 0 %   Wound Bed Fibrin (%) 0 % 0 %   State of Healing Undermining;Hyperkeratosis;Non-healing Early/partial granulation   Wound Odor Strong None   Tunneling? -- No   Undermining? Yes No   Number of Undermines 1 --   Undermine 1 Start Position 9 --   Undermine 1 End Position 6 --    Sinus Tracts? -- No   Nunn Scale -- Grade 3       Active Orders   Date Order Priority Status Authorizing Provider   03/04/25 1503 OP Wound Dressing Routine Active Maite Goddard APRN     - Cleansing:    Cleanse with normal saline or wound cleanser     - Dressing:    Dry gauze     - Dressing:    Kerlix     - Dressing:    Paper tape     - Additional Wound Dressing Information:    1/4 ST Damp Dakins gauze,  1 offloading pad     - Frequency:    Change dressing daily and PRN   01/22/25 1626 OP Wound Dressing Routine Active Maite Goddard APRN     - Cleansing:    Cleanse with normal saline or wound cleanser     - Dressing:    Dry gauze     - Dressing:    Paper tape     - Additional Wound Dressing Information:    1/4 St Dakins moist to wet dressing     - Frequency:    Change dressing two times a day   12/17/24 1650 OP Wound Dressing Routine Active Maite Goddard APRN     - Cleansing:    Cleanse with Dakins     - Cleansing:    Cleanse with normal saline or wound cleanser     - Dressing:    Wet to dry     - Additional Wound Dressing Information:    All wounds: Zinc oxide to periwound, Dakin's moistened gauze into wound bed, dry gauze, tape. Offloading felt x 2 to left foot.     - Frequency:    Change dressing two times a day       Inactive Orders   Date Order Priority Status Authorizing Provider   03/04/25 1358 Debridement Diabetic Ulcer Plantar;Left Foot Routine Completed Maite Goddard, APRN   02/20/25 1503 Debridement Diabetic Ulcer Plantar;Left Foot Routine Completed Maite Goddard APRN   01/08/25 1347 Debridement Diabetic Ulcer Plantar;Left Foot Routine Completed Maite Goddard APRN   12/17/24 1445 Debridement Diabetic Ulcer Plantar;Left Foot Routine Completed Maite Goddard APRN   12/12/24 1205 Debridement Diabetic Ulcer Plantar;Left Foot Routine Completed Maite Goddard APRN       Wound 12/12/24 2 Foot Left;Lateral;Plantar (Active)   Date First Assessed/Time First Assessed: 12/12/24 1102     Wound Number (Wound Clinic Only): 2  Primary Wound Type: Diabetic Ulcer  Location: Foot  Wound Location Orientation: Left;Lateral;Plantar      Assessments 12/12/2024 11:04 AM 3/4/2025  1:45 PM   Wound Image        Site Assessment Moist;Red;Granulation tissue Moist;Pink;Red;Yellow   Closure Not approximated Not approximated   Drainage Amount Moderate Moderate   Drainage Description Serosanguineous Serosanguineous   Treatments Cleansed;Saline;Wound ;Dakins;Shoe Cleansed;Saline;Wound ;Dakins;Shoe   Shoe Type Felt Felt   Dressing Gauze;Tape 4x4s;Gauze;Kerlix roll;Tape   Dressing Changed New Changed   Dressing Status Clean;Dry;Intact Clean;Dry;Intact;Dressing Changed   Wound Length (cm) 2.4 cm 1.5 cm   Wound Width (cm) 1.7 cm 1.1 cm   Wound Surface Area (cm^2) 4.08 cm^2 1.65 cm^2   Wound Depth (cm) 0.7 cm 0.3 cm   Wound Volume (cm^3) 2.856 cm^3 0.495 cm^3   Wound Healing % -- 83   Margins Well-defined edges;Not attached Well-defined edges;Not attached   Non-staged Wound Description Full thickness Full thickness   Marcy-wound Assessment Dry;Callous Callous;Hyperpigmented   Wound Granulation Tissue Red Pink;Red   Wound Bed Granulation (%) 100 % 100 %   Wound Bed Epithelium (%) 0 % 0 %   Wound Bed Slough (%) 0 % 0 %   Wound Bed Eschar (%) 0 % 0 %   Wound Bed Fibrin (%) 0 % 0 %   State of Healing Undermining;Hyperkeratosis;Non-healing Undermining;Hyperkeratosis;Non-healing   Wound Odor Strong None   Tunneling? -- No   Undermining? Yes Yes   Number of Undermines 1 1   Undermine 1 Start Position 7 12   Undermine 1 End Position 12 12   Sinus Tracts? -- No   Nunn Scale -- Grade 2       Active Orders   Date Order Priority Status Authorizing Provider   03/04/25 1503 OP Wound Dressing Routine Active Maite Goddard APRN     - Cleansing:    Cleanse with normal saline or wound cleanser     - Dressing:    Dry gauze     - Dressing:    Kerlix     - Dressing:    Paper tape     - Additional Wound Dressing Information:     1/4 ST Damp Dakins gauze,  1 offloading pad     - Frequency:    Change dressing daily and PRN   01/22/25 1626 OP Wound Dressing Routine Active Maite Goddard APRN     - Cleansing:    Cleanse with normal saline or wound cleanser     - Dressing:    Dry gauze     - Dressing:    Paper tape     - Additional Wound Dressing Information:    1/4 St Dakins moist to wet dressing     - Frequency:    Change dressing two times a day   12/17/24 1650 OP Wound Dressing Routine Active Maite Goddard APRN     - Cleansing:    Cleanse with Dakins     - Cleansing:    Cleanse with normal saline or wound cleanser     - Dressing:    Wet to dry     - Additional Wound Dressing Information:    All wounds: Zinc oxide to periwound, Dakin's moistened gauze into wound bed, dry gauze, tape. Offloading felt x 2 to left foot.     - Frequency:    Change dressing two times a day       Inactive Orders   Date Order Priority Status Authorizing Provider   03/04/25 1418 Debridement Diabetic Ulcer Left;Lateral;Plantar Foot Routine Completed Maite Goddard, LYNDSAY   02/20/25 1504 Debridement Diabetic Ulcer Left;Lateral;Plantar Foot Routine Completed Maite Goddard APRN   01/08/25 1348 Debridement Diabetic Ulcer Left;Lateral;Plantar Foot Routine Completed Maite Goddard APRN   12/17/24 1457 Debridement Diabetic Ulcer Left;Lateral;Plantar Foot Routine Completed Maite Goddard, APRN   12/12/24 1207 Debridement Diabetic Ulcer Left;Lateral;Plantar Foot Routine Completed Maite Goddard, LYNDSAY     Procedures  ASSESSMENT AND PLAN:      There are no diagnoses linked to this encounter.    F/u b/l DFUs. S/p L TMA 11/28/21 and right 5th ray amputation 10/2022. Wounds have been present for >1 year. He has PAD with hx of revascularizations, most recently on LLE 2/5/25 with Dr. Najjar--angioplasty of left post tib and left peroneal arteries. MRI b/l feet 2/16/25, mild early OM of left medial cuneiform. Saw ID this AM, starting IV abx x 6 weeks.   Left Foot:  Wound measurements continue to improve. Wound beds are granular. Periwound w/callus. No local or systemic s/sx of infection. Wounds selectively debrided.      Wound care: Continue Dakin's 1/4 strength wet to moist gauze dressing 1-2x/day, felt offloading pads.   -Starting IV abx this week  -F/u with Dr. Holliday as scheduled next week    Hindering factors for wound healing include uncontrolled T2DM, tobacco use, PAD and peripheral neuropathy.  Continue to monitor for signs and symptoms of infection, encouraged him to assess skin daily, seek immediate care if he develops any s/sx of infection.  Continue to work on complete smoking cessation along with diabetes control, discussed impact on wound healing. Would benefit from diabetes education.  Continue nutrition for wound healing -- reduced carbohydrate intake, increased protein intake, and healthy diet. Recommended increased vitamin intake (either with foods or vitamin supplements), need vitamin A, Bs, C, D and zinc for wound healing.   Risks, benefits, and alternatives of current treatment plan discussed in detail.  Questions and concerns addressed. Red flags to RTC or ED reviewed.  Patient agrees to plan.      No follow-ups on file.    I spent 30 minutes with the patient. This time included:  preparing to see the patient (eg, review notes and recent diagnostics), seeing the patient, performing a medically appropriate examination and/or evaluation, counseling and educating the patient, and documenting in the record.    NOTE TO PATIENT: The 21st Century Cures Act makes clinical notes like these available to patients in the interest of transparency. Clinical notes are medical documents used by physicians and care providers to communicate with each other. These documents include medical language and terminology, abbreviations, and treatment information that may sound technical and at times possibly unfamiliar. In addition, at times, the verbiage may appear blunt or  direct. These documents are one tool providers use to communicate relevant information and clinical opinions of the care providers in a way that allows common understanding of the clinical context.

## 2025-05-08 ENCOUNTER — TELEPHONE (OUTPATIENT)
Facility: CLINIC | Age: 60
End: 2025-05-08

## 2025-05-08 PROBLEM — E11.3393 TYPE 2 DIABETES MELLITUS WITH BOTH EYES AFFECTED BY MODERATE NONPROLIFERATIVE RETINOPATHY WITHOUT MACULAR EDEMA, WITH LONG-TERM CURRENT USE OF INSULIN (HCC): Status: ACTIVE | Noted: 2025-05-08

## 2025-05-08 PROBLEM — Z79.4 TYPE 2 DIABETES MELLITUS WITH BOTH EYES AFFECTED BY MODERATE NONPROLIFERATIVE RETINOPATHY WITHOUT MACULAR EDEMA, WITH LONG-TERM CURRENT USE OF INSULIN (HCC): Status: ACTIVE | Noted: 2025-05-08

## 2025-05-30 ENCOUNTER — TELEPHONE (OUTPATIENT)
Facility: CLINIC | Age: 60
End: 2025-05-30

## 2025-05-30 DIAGNOSIS — E11.65 TYPE 2 DIABETES MELLITUS WITH HYPERGLYCEMIA, WITHOUT LONG-TERM CURRENT USE OF INSULIN (HCC): ICD-10-CM

## 2025-05-30 NOTE — TELEPHONE ENCOUNTER
Outgoing call to patient to schedule a follow up appointment for future refills. This PSR LDMTCB.

## 2025-05-30 NOTE — TELEPHONE ENCOUNTER
Please review; protocol failed/ has no protocol      Message sent for patient to make a follow up  appointment.

## 2025-05-30 NOTE — TELEPHONE ENCOUNTER
Noted. Refilled this request. No further refills until appointment made    Alea Al MD, 05/30/25, 1:51 PM

## 2025-07-11 ENCOUNTER — TELEPHONE (OUTPATIENT)
Facility: CLINIC | Age: 60
End: 2025-07-11

## 2025-07-11 NOTE — TELEPHONE ENCOUNTER
Ana RN calling from [facility] to state that on 07/09 patient had labs drawn in clinic and came back with  a low glucose of 50. States that patient is asymptomatic. Patient will have repeat labs today. States that patient had egg mcmuffin before lab draw. Ana was just calling to let provider know.

## 2025-08-19 DIAGNOSIS — E11.65 TYPE 2 DIABETES MELLITUS WITH HYPERGLYCEMIA, WITHOUT LONG-TERM CURRENT USE OF INSULIN (HCC): ICD-10-CM

## (undated) NOTE — LETTER
Date: 5/23/2024  Patient name: Yo Villagomez  YOB: 1965  Medical Record Number: [id number]  Primary Coverage: Payor: [employer] / Plan: [facility] / Product Type: *No Product type* /   Secondary Coverage:   Insurance ID: [id number]  Patient Address: [address] [place] [id number]  Telephone Information:   Home Phone [phone]   Mobile [phone]     Encounter Date: 5/16/2024  Provider: LYNDSAY Siddiqi  Diagnosis:     ICD-10-CM   1. Diabetic ulcer of left midfoot associated with type 2 diabetes mellitus, with fat layer exposed ([facility])  E11.621    L97.422   2. Dry gangrene ([facility])  I96   3. Chronic foot pain, right  M79.671    G89.29   4. Uncontrolled type 2 diabetes mellitus with hyperglycemia, without long-term current use of insulin ([facility])  E11.65   5. Tobacco dependence with current use  F17.200   6. PAD (peripheral artery disease) ([facility])  I73.9     Progress Note:  Chief Complaint   Patient presents with    Wound Care     Bilateral feet. Pt has not checked Bs today      HPI:   5/16/24: 59 yr old male here today for chronic wound on left foot. His current medical problems include uncontrolled T2DM (A1c 15.5% on 5/13/24), diabetic neuropathy, tobacco use (1/2 PPD), HLD, PAD. 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 He underwent RLE angiogram with intervention 2/20/23. He reports he had been seeing a podiatrist regularly until his insurance changed this year. He noticed wounds developed on his left foot about 4 months ago. He  has been applying betadine and covering with gauze almost every day. He denies drainage from the right foot and has noticed the skin changes over the past year as well. He has chronic intermittent pain in both feet for which he reports he previously received [place] from his podiatrist. He used to wear a boot to offload wounds, no longer has it. He tries to walk on his heel as much as possible. PCP ordered a cane, but is  having a hard time getting from DME company. He lives alone, says he stays with his mother often. He has cut back on tobacco use from 1 PPD to 1/2 PPD. He denies recent alcohol use and denies recent illicit substance use.     Lab Results   Component Value Date    BUN 20 05/13/2024    CREATSERUM 1.50 (H) 05/13/2024    ALB 4.0 05/13/2024    TP 7.8 05/13/2024    A1C  05/13/2024      Comment:      Hemoglobin A1C to be confirmed at [facility]    A1C >15.5 (H) 05/13/2024       Pertinent Imaging/Results (copy/pasted from [facility] Everywhere--[facility]):    Garry Fournier MD - 01/23/2023   Formatting of this note might be different from the original.   EXAM:   ABIs COMPLETE     HISTORY:   PAD     COMPARISON:   None     TECHNIQUE:   Segmental arterial pressures and Doppler waveforms at three arterial levels in the leg, and toe plethysmography.     FINDINGS:   RIGHT: LACIE 0.61, toe brachial index 0.23, toe pressure 32 mmHg. Brachial pressure 137 mmHg.     Right leg waveforms as follows:   Common femoral: Triphasic   Superficial femoral:  Triphasic   Popliteal:  Triphasic   Posterior tibial:  Monophasic   Dorsalis pedis:  Monophasic     LEFT: LACIE 0.91, toe brachial index was unable to be performed due to amputation. Brachial pressure 130 mmHg.     Left leg waveforms as follows:   Common femoral: Triphasic   Superficial femoral:  Triphasic   Popliteal:  Triphasic   Posterior tibial:  Triphasic   Dorsalis pedis:  Triphasic     IMPRESSION:   1. On the right, there is there is moderate evidence of arterial insufficiency by waveform and LACIE criteria. There is severe pedal arterial insufficiency by TBI criteria (TBI 0.23) and waveform amplitude, suggestive of poor wound healing potential.     2.  On the left, there is mild arterial insufficiency by waveform and TBI criteria. Unable to perform TBI due to amputation..     IR Arteriogram Lower Extremity Unilateral/Bilateral 2/20/23    Anatomical Region Laterality Modality   Upper  Extremities -- X-Ray Angiography   Lower Extremities -- --     Impression  1. Right lower extremity angiogram demonstrates focal moderate popliteal stenosis, moderate stenosis of the proximal to mid peroneal artery and occluded PT occluded reconstituted at the ankle. The AT is occluded with only DP reconstitution.  2. Successful revascularization of the distal popliteal, TPT trunk, Peroneal artery and posterior tibial artery with POBA.  3. Balloon angioplasty of the peroneal artery, tibioperoneal trunk, and posterior tibial artery. Proximal aspects of each tibial vessel were scoring balloon angioplasty.  4. 4mm [place] DCB treatment of the distal pop extending to the proximal PT.    PLAN:  1. Bedrest 3 hours. Groin checks and pulse checks as ordered.  2. Okay to restart ASA and Plavix tonight.  3. Follow-up with Dr. Mueller in [facility] in 4 weeks with LACIE/ toe pressures.      Current Outpatient Medications:     sodium hypochlorite 0.125 % External Solution, Moisten gauze with solution. Lightly fluff into wound beds. Cover with dry gauze. Secure with gauze roll and tape. Change twice daily., Disp: 237 mL, Rfl: 1    Continuous Glucose Sensor (FREESTYLE BRIDGER 2 SENSOR) Does not apply Misc, Inject 1 Device into the skin every 14 (fourteen) days., Disp: , Rfl:     clopidogrel 75 MG Oral Tab, Take 1 tablet (75 mg total) by mouth nightly., Disp: , Rfl:     aspirin 81 MG Oral Tab EC, Take 1 tablet (81 mg total) by mouth daily., Disp: , Rfl:     gabapentin 300 MG Oral Cap, Take 1 capsule (300 mg total) by mouth 3 (three) times daily., Disp: , Rfl:     glipiZIDE 10 MG Oral Tab, Take 1 tablet (10 mg total) by mouth 2 (two) times daily before meals., Disp: , Rfl:     HYDROcodone-acetaminophen 5-325 MG Oral Tab, Take 1 tablet by mouth every 12 (twelve) hours as needed for Pain., Disp: , Rfl:     metFORMIN HCl 1000 MG Oral Tab, Take 1 tablet (1,000 mg total) by mouth 2 (two) times daily., Disp: , Rfl:      simvastatin 20 MG Oral Tab, Take 1 tablet (20 mg total) by mouth daily., Disp: 90 tablet, Rfl: 3    Misc. Devices (CANE) Does not apply Misc, To use daily for ambulation Dx code Z89.422, Disp: 1 each, Rfl: 0    nicotine 14 MG/24HR Transdermal Patch 24 Hr, Place 1 patch onto the skin daily., Disp: 28 each, Rfl: 3    Allergies   Allergen Reactions    Ace Inhibitors UNKNOWN      REVIEW OF SYSTEMS:   Review of Systems   Constitutional:  Negative for activity change, chills and fever.   Respiratory:  Negative for shortness of breath.    Cardiovascular:  Negative for chest pain and leg swelling.   Gastrointestinal:  Negative for abdominal pain, diarrhea and vomiting.   Musculoskeletal:  Positive for arthralgias and gait problem.   Skin:  Positive for wound. Negative for rash.   Neurological:  Negative for dizziness, numbness and headaches.      PHYSICAL EXAM:   /79   Pulse 101   Temp 97.5 °F (36.4 °C)   Resp 18   Ht 72\"   Wt 208 lb   SpO2 99%   BMI 28.21 kg/m²    Estimated body mass index is 28.21 kg/m² as calculated from the following:    Height as of this encounter: 72\".    Weight as of this encounter: 208 lb.   Vital signs reviewed.Appears stated age, well groomed.    Physical Exam  Constitutional:       General: He is not in acute distress.     Appearance: Normal appearance. He is obese. He is not ill-appearing.   HENT:      Head: Normocephalic and atraumatic.   Cardiovascular:      Pulses:           Dorsalis pedis pulses are 2+ on the right side and 2+ on the left side.        Posterior tibial pulses are 2+ on the right side and 2+ on the left side.   Pulmonary:      Effort: Pulmonary effort is normal.   Musculoskeletal:      Right foot: Deformity present.      Left foot: Deformity present.      Right Lower Extremity: Right leg is amputated below ankle. (Right 5th toe)     Left Lower Extremity: Left leg is amputated below ankle. (TMA)  Feet:      Right foot:      Skin integrity: Skin breakdown (Dry  gangrene of right 4th & 5th toes), callus, dry skin and fissure present. No erythema or warmth.      Toenail Condition: Right toenails are abnormally thick.      Left foot:      Skin integrity: Ulcer, callus and dry skin present. No erythema or warmth.      Toenail Condition: Left toenails are abnormally thick.      Comments: B/l PT and DP with biphasic wave sounds using handheld doppler. Hair present on toes.    Skin:     General: Skin is warm and dry.      Capillary Refill: Capillary refill takes 2 to 3 seconds.      Findings: Wound (left foot) present.   Neurological:      General: No focal deficit present.      Mental Status: He is alert and oriented to person, place, and time.   Psychiatric:         Mood and Affect: Mood normal.         Behavior: Behavior normal.        Wound 05/16/24 1 Foot Right;Lateral (Active)   Date First Assessed/Time First Assessed: 05/16/24 1407    Wound Number (Wound Clinic Only): 1  Primary Wound Type: Amputation  Location: Foot  Wound Location Orientation: Right;Lateral      Assessments 5/16/2024  2:17 PM   Wound Image      Site Assessment Dry;Black   Closure Approximated   Drainage Amount None   Treatments Cleansed;Saline   Dressing Open to air       No associated orders.       Wound 05/16/24 2 Foot Left;Plantar (Active)   Date First Assessed/Time First Assessed: 05/16/24 1408    Wound Number (Wound Clinic Only): 2  Primary Wound Type: Amputation  Location: Foot  Wound Location Orientation: Left;Plantar      Assessments 5/16/2024  2:17 PM   Wound Image       Site Assessment Fragile;Moist;Red;Yellow;Brown   Closure Not approximated   Drainage Amount Small   Drainage Description Serosanguineous   Treatments Cleansed;Saline   Dressing 4x4s;Gauze;Kerlix roll;Tape   Dressing Changed New   Dressing Status Clean;Dry;Intact   Wound Length (cm) 2.2 cm   Wound Width (cm) 2.5 cm   Wound Surface Area (cm^2) 5.5 cm^2   Wound Depth (cm) 0.8 cm   Wound Volume (cm^3) 4.4 cm^3   Margins Undefined  edges   Marcy-wound Assessment Dry;Black;Callous   Wound Granulation Tissue Red   Wound Bed Granulation (%) 80 %   Wound Bed Epithelium (%) 0 %   Wound Bed Slough (%) 20 %   Wound Bed Eschar (%) 0 %   Wound Bed Fibrin (%) 0 %   State of Healing Early/partial granulation   Wound Odor Strong   Undermining? Yes   Number of Undermines 1   Undermine 1 Start Position 2   Undermine 1 End Position 3   Nunn Scale Grade 2       Active Orders   Date Order Priority Status Authorizing Provider   05/18/24 2213 OP Wound Dressing Routine Active Maite Goddard APRN     - Cleansing:    Cleanse with normal saline or wound cleanser     - Dressing:    Dry gauze     - Dressing:    Kerlix     - Additional Wound Dressing Information:    Dakin's solution 0.125% solution moist to dry dressing     - Frequency:    Change dressing daily and PRN       Wound 05/16/24 3 Foot Left;Lateral (Active)   Date First Assessed: 05/16/24    Wound Number (Wound Clinic Only): 3  Primary Wound Type: Diabetic Ulcer  Location: Foot  Wound Location Orientation: Left;Lateral      Assessments 5/16/2024  2:17 PM   Wound Image     Site Assessment Dry;Red;Yellow;Brown   Closure Not approximated   Drainage Amount Scant   Drainage Description Serous;Yellow   Treatments Cleansed;Saline   Dressing 4x4s;Gauze;Kerlix roll;Tape   Dressing Changed New   Dressing Status Clean;Dry;Intact   Wound Length (cm) 0.9 cm   Wound Width (cm) 1 cm   Wound Surface Area (cm^2) 0.9 cm^2   Wound Depth (cm) 0.5 cm   Wound Volume (cm^3) 0.45 cm^3   Margins Not attached   Marcy-wound Assessment Dry;Red   Wound Granulation Tissue Red   Wound Bed Granulation (%) 70 %   Wound Bed Epithelium (%) 0 %   Wound Bed Slough (%) 30 %   Wound Bed Eschar (%) 0 %   Wound Bed Fibrin (%) 0 %   State of Healing Non-healing   Wound Odor Strong   Tunneling? No   Undermining? Yes   Number of Undermines 1   Undermine 1 Start Position 3   Undermine 1 End Position 6       Active Orders   Date Order Priority Status  Authorizing Provider   05/18/24 7909 OP Wound Dressing Routine Active Maite Goddard APRN     - Cleansing:    Cleanse with normal saline or wound cleanser     - Dressing:    Dry gauze     - Dressing:    Kerlix     - Additional Wound Dressing Information:    Dakin's solution 0.125% solution moist to dry dressing     - Frequency:    Change dressing daily and PRN        ASSESSMENT AND PLAN:      1. Diabetic ulcer of left midfoot associated with type 2 diabetes mellitus, with fat layer exposed (HCC)  - XR FOOT, COMPLETE (MIN 3 VIEWS), LEFT (CPT=73630); Future  - sodium hypochlorite 0.125 % External Solution; Moisten gauze with solution. Lightly fluff into wound beds. Cover with dry gauze. Secure with gauze roll and tape. Change twice daily.  Dispense: 237 mL; Refill: 1  - OP Wound Dressing; Standing    2. Dry gangrene ([facility])    3. Chronic foot pain, right  - XR FOOT, COMPLETE (MIN 3 VIEWS), RIGHT (CPT=73630); Future  - OP Wound Dressing; Standing    4. Uncontrolled type 2 diabetes mellitus with hyperglycemia, without long-term current use of insulin ([facility])    5. Tobacco dependence with current use    6. PAD (peripheral artery disease) ([facility])    Here today for evaluation of DFUs on the lateral and plantar aspect of his left foot. He is s/p L TMA 11/28/21. Pt reports wounds have been present for about 4 months. The right foot was evaluated as well, dry gangrene of the right 4th and 5th toes, no open wound.   The wound beds of he are granular with slough, periwound with thick callus and dry skin. Wound bed was mechanically debrided with dry gauze and periwound callus trimmed with scissors. Wounds were soaked with Dakin's 0.125% for 10 minutes. Recommended he do a wet to moist dressing using Dakin's 0.125% with gauze twice daily.   Xrays were ordered of both feet to evaluate for underlying OM. Pt has a referral to podiatry. Recommended he see a vascular surgeon as well, encouraged him to schedule appointments. Will f/u on  status at visit next week.   Discussed the process of wound healing, creating/maintaining a moist, clean environment for wound healing  Discussed hindering factors for wound healing that include uncontrolled T2DM, tobacco use, PAD and peripheral neuropathy.  Discussed signs and symptoms of infection, encouraged patient to assess skin daily.  Discussed nutrition for wound healing, encouraged reduced carbohydrate intake, increased protein intake, and healthy diet. Recommended increased vitamin intake (either with foods or vitamin supplements), need vitamin A, Bs, C, D and zinc for wound healing.   Discussed importance of glucose control and quitting smoking  Risks, benefits, and alternatives of current treatment plan discussed in detail.  Questions and concerns addressed. Red flags to RTC or ED reviewed.  Patient agrees to plan.      Return in about 1 week (around 5/23/2024) for APN visit x 30 mins.    I spent 60 minutes with the patient. This time included:  preparing to see the patient (eg, review notes and recent diagnostics), seeing the patient, performing a medically appropriate examination and/or evaluation, counseling and educating the patient, and documenting in the record.    NOTE TO PATIENT: The 21st Century Cures Act makes clinical notes like these available to patients in the interest of transparency. Clinical notes are medical documents used by physicians and care providers to communicate with each other. These documents include medical language and terminology, abbreviations, and treatment information that may sound technical and at times possibly unfamiliar. In addition, at times, the verbiage may appear blunt or direct. These documents are one tool providers use to communicate relevant information and clinical opinions of the care providers in a way that allows common understanding of the clinical context.           Wound Treatment Orders:  Orders Placed This Encounter   Procedures    OP Wound Dressing      Standing Status:   Standing     Number of Occurrences:   4     Standing Expiration Date:   6/6/2024     Order Specific Question:   Cleansing     Answer:   Cleanse with normal saline or wound cleanser     Order Specific Question:   Dressing     Answer:   Dry gauze     Order Specific Question:   Dressing     Answer:   Kerlix     Order Specific Question:   Additional Wound Dressing Information     Answer:   Dakin's solution 0.125% solution moist to dry dressing     Order Specific Question:   Frequency     Answer:   Change dressing daily and PRN       Wound Information/Order:  Wound Number: All wounds  Product:Dry gauze, kerlix, tape  Dispense: 30 days  Dressing Frequency:Change dressing twice daily    Was a Debridement performed: Yes, Debridement type: mechanical      NURIS Siddiqi NPI [id number]

## (undated) NOTE — LETTER
Date: 12/12/2024  Patient name: Yo Villagomez  YOB: 1965  Medical Record Number: [id number]  Primary Coverage: Payor: [place] [employer]IC[employer] / Plan: [employer] MEDICARE / Product Type: Medicare /   Secondary Coverage: [employer] MEDICARE - [facility] MEDICARE  Insurance ID: [id number]  Patient Address: [address] [place] [address]  Telephone Information:   Home Phone [phone]   Mobile [phone]     Encounter Date: 12/12/2024  Provider: LYNDSAY Siddiqi  Diagnosis:     ICD-10-CM   1. Diabetic ulcer of left midfoot associated with type 2 diabetes mellitus, limited to breakdown of skin ([facility])  E11.621    L97.421   2. Diabetic ulcer of right midfoot associated with type 2 diabetes mellitus, with bone involvement without evidence of necrosis ([facility])  E11.621    L97.416   3. Peripheral arterial disease with history of revascularization ([facility])  I73.9    Z98.890   4. Tobacco dependence with current use  F17.200   5. Uncontrolled type 2 diabetes mellitus with hyperglycemia, with long-term current use of insulin ([facility])  E11.65    Z79.4         Wound 12/12/24 1 Foot Plantar;Left (Active)   Date First Assessed/Time First Assessed: 12/12/24 1101    Wound Number (Wound Clinic Only): 1  Primary Wound Type: Diabetic Ulcer  Location: Foot  Wound Location Orientation: Plantar;Left      Assessments 12/12/2024 11:04 AM 12/12/2024 11:05 AM   Wound Image       Site Assessment Moist;Red;Granulation tissue Moist;Red;Granulation tissue   Closure Not approximated Not approximated   Drainage Amount Moderate Moderate   Drainage Description Serosanguineous Serosanguineous   Treatments Cleansed;Saline;Wound ;Dakins;Shoe Cleansed;Saline;Wound ;Dakins;Shoe   Shoe Type Felt Felt   Dressing Gauze;Tape Gauze;Tape   Dressing Changed New New   Dressing Status Clean;Dry;Intact Clean;Dry;Intact   Wound Length (cm) 2.3 cm 2.5 cm   Wound Width (cm) 2.3 cm 3 cm   Wound Surface  Area (cm^2) 5.29 cm^2 7.5 cm^2   Wound Depth (cm) 1 cm 1 cm   Wound Volume (cm^3) 5.29 cm^3 7.5 cm^3   Wound Healing % -- -42   Margins Well-defined edges;Not attached Well-defined edges;Not attached   Non-staged Wound Description Full thickness Full thickness   Marcy-wound Assessment Dry;Callous Dry;Callous   Wound Granulation Tissue Red Red   Wound Bed Granulation (%) 100 % 100 %   Wound Bed Epithelium (%) 0 % 0 %   Wound Bed Slough (%) 0 % 0 %   Wound Bed Eschar (%) 0 % 0 %   Wound Bed Fibrin (%) 0 % 0 %   State of Healing Undermining;Hyperkeratosis;Non-healing Undermining;Hyperkeratosis;Non-healing   Wound Odor Strong Strong   Undermining? Yes Yes   Number of Undermines 1 --   Undermine 1 Start Position 9 --   Undermine 1 End Position 6 --   Nunn Scale -- Grade 2       Wound 12/12/24 2 Foot Left;Lateral;Plantar (Active)   Date First Assessed/Time First Assessed: 12/12/24 1102    Wound Number (Wound Clinic Only): 2  Primary Wound Type: Diabetic Ulcer  Location: Foot  Wound Location Orientation: Left;Lateral;Plantar      Assessments 12/12/2024 11:04 AM 12/12/2024 11:05 AM   Wound Image       Site Assessment Moist;Red;Granulation tissue Moist;Red;Granulation tissue   Closure Not approximated Not approximated   Drainage Amount Moderate Moderate   Drainage Description Serosanguineous Serosanguineous   Treatments Cleansed;Saline;Wound ;Dakins;Shoe Cleansed;Saline;Wound ;Dakins;Shoe   Shoe Type Felt Felt   Dressing Gauze;Tape Gauze;Tape   Dressing Changed New New   Dressing Status Clean;Dry;Intact Clean;Dry;Intact   Wound Length (cm) 2.4 cm 2.5 cm   Wound Width (cm) 1.7 cm 1.9 cm   Wound Surface Area (cm^2) 4.08 cm^2 4.75 cm^2   Wound Depth (cm) 0.7 cm 0.5 cm   Wound Volume (cm^3) 2.856 cm^3 2.375 cm^3   Wound Healing % -- 17   Margins Well-defined edges;Not attached Well-defined edges;Not attached   Non-staged Wound Description Full thickness Full thickness   Marcy-wound Assessment Dry;Callous  Dry;Callous   Wound Granulation Tissue Red Red   Wound Bed Granulation (%) 100 % 100 %   Wound Bed Epithelium (%) 0 % 0 %   Wound Bed Slough (%) 0 % 0 %   Wound Bed Eschar (%) 0 % 0 %   Wound Bed Fibrin (%) 0 % 0 %   State of Healing Undermining;Hyperkeratosis;Non-healing Undermining;Hyperkeratosis;Non-healing   Wound Odor Strong Strong   Undermining? Yes Yes   Number of Undermines 1 --   Undermine 1 Start Position 7 --   Undermine 1 End Position 12 --           Wound 12/12/24 3 Foot Right;Lateral (Active)   Date First Assessed/Time First Assessed: 12/12/24 1122    Wound Number (Wound Clinic Only): 3  Primary Wound Type: Diabetic Ulcer  Location: Foot  Wound Location Orientation: Right;Lateral      Assessments 12/12/2024 11:04 AM 12/12/2024 11:05 AM   Wound Image         Site Assessment Dry;Brown Dry;Brown;Moist;Red   Drainage Amount None Scant   Drainage Description Scabbed --   Treatments Cleansed;Wound ;Topical (Barrier/Moisturizer/Ointment);Dakins Cleansed;Wound ;Topical (Barrier/Moisturizer/Ointment)   Dressing Gauze;Tape Gauze;Tape   Dressing Changed New New   Dressing Status Clean;Dry;Intact Clean;Dry;Intact   Wound Length (cm) 8.6 cm 8.6 cm   Wound Width (cm) 5 cm 5 cm   Wound Surface Area (cm^2) 43 cm^2 43 cm^2   Wound Depth (cm) 0.1 cm 0.2 cm   Wound Volume (cm^3) 4.3 cm^3 8.6 cm^3   Margins Poorly defined Poorly defined   Non-staged Wound Description Not applicable Full thickness   Marcy-wound Assessment Dry;Callous Dry;Brown;Callous   Wound Bed Granulation (%) 0 % 5 %   Wound Bed Epithelium (%) 0 % 0 %   Wound Bed Slough (%) 0 % 0 %   Wound Bed Eschar (%) 0 % 0 %   Wound Bed Fibrin (%) 100 % 100 %   State of Healing Non-healing;Hyperkeratosis Non-healing;Hyperkeratosis   Wound Odor Strong Strong   Shape Cluster of 2 Cluster of 2                                         Needed Supplies:  Wound Information/Order:  Wound Number: All wounds  Product: Dry gauze, kerlix, medipore tape (no  substitutions).  Dispense: 30 days  Dressing Frequency:Change dressing daily and/or PRN    Was a Debridement performed: Yes, Debridement type: selective    Compression Stockings ordered: No    Notes: Please call patient with out of pocket costs, if any. Thank you!     Additional wound: No      NURIS Siddiqi  NPI [id number]

## (undated) NOTE — LETTER
Date: 5/16/2024  Patient name: Yo Villagomez  YOB: 1965  Medical Record Number: [id number]  Primary Coverage: Payor: [employer] / Plan: [facility] / Product Type: *No Product type* /   Secondary Coverage:   Insurance ID: [id number]  Patient Address: [address] [place] [id number]  Telephone Information:   Home Phone [phone]   Mobile [phone]     Encounter Date: 5/16/2024  Provider: LYNDSAY Siddiqi  Diagnosis:     ICD-10-CM   1. Diabetic ulcer of left midfoot associated with type 2 diabetes mellitus, with fat layer exposed (HCC)  E11.621    L97.422   2. Chronic foot pain, right  M79.671    G89.29   3. Abnormal skin growth  D49.2   4. Uncontrolled type 2 diabetes mellitus with hyperglycemia, without long-term current use of insulin ([facility])  E11.65     Progress Note:    Wound Treatment Orders:  Orders Placed This Encounter   Procedures    OP Wound Dressing     Standing Status:   Standing     Number of Occurrences:   4     Standing Expiration Date:   6/6/2024     Order Specific Question:   Cleansing     Answer:   Cleanse with normal saline or wound cleanser     Order Specific Question:   Dressing     Answer:   Dry gauze     Order Specific Question:   Dressing     Answer:   Kerlix     Order Specific Question:   Additional Wound Dressing Information     Answer:   Dakin's solution 0.125% solution moist to dry dressing     Order Specific Question:   Frequency     Answer:   Change dressing daily and PRN     Wound Information/Order:  Wound Number: All wounds  Product:Dry gauze and Kerlix, tape  Dispense: 15 days  Dressing Frequency:Change dressing daily and/or PRN    Was a Debridement performed: Yes, Debridement type: mechanical          NURIS Siddiqi  NPNESHA [id number]